# Patient Record
Sex: FEMALE | Race: OTHER | HISPANIC OR LATINO | Employment: PART TIME | URBAN - METROPOLITAN AREA
[De-identification: names, ages, dates, MRNs, and addresses within clinical notes are randomized per-mention and may not be internally consistent; named-entity substitution may affect disease eponyms.]

---

## 2017-03-28 ENCOUNTER — ALLSCRIPTS OFFICE VISIT (OUTPATIENT)
Dept: OTHER | Facility: OTHER | Age: 32
End: 2017-03-28

## 2017-04-12 ENCOUNTER — ALLSCRIPTS OFFICE VISIT (OUTPATIENT)
Dept: OTHER | Facility: OTHER | Age: 32
End: 2017-04-12

## 2017-04-26 ENCOUNTER — ALLSCRIPTS OFFICE VISIT (OUTPATIENT)
Dept: OTHER | Facility: OTHER | Age: 32
End: 2017-04-26

## 2017-05-16 ENCOUNTER — ALLSCRIPTS OFFICE VISIT (OUTPATIENT)
Dept: OTHER | Facility: OTHER | Age: 32
End: 2017-05-16

## 2017-05-30 ENCOUNTER — ALLSCRIPTS OFFICE VISIT (OUTPATIENT)
Dept: OTHER | Facility: OTHER | Age: 32
End: 2017-05-30

## 2017-06-17 ENCOUNTER — APPOINTMENT (EMERGENCY)
Dept: ULTRASOUND IMAGING | Facility: HOSPITAL | Age: 32
End: 2017-06-17
Payer: COMMERCIAL

## 2017-06-17 ENCOUNTER — HOSPITAL ENCOUNTER (EMERGENCY)
Facility: HOSPITAL | Age: 32
Discharge: HOME | End: 2017-06-17
Attending: EMERGENCY MEDICINE
Payer: COMMERCIAL

## 2017-06-17 VITALS
SYSTOLIC BLOOD PRESSURE: 118 MMHG | WEIGHT: 251 LBS | DIASTOLIC BLOOD PRESSURE: 56 MMHG | OXYGEN SATURATION: 97 % | RESPIRATION RATE: 16 BRPM | BODY MASS INDEX: 42.85 KG/M2 | TEMPERATURE: 98.7 F | HEIGHT: 64 IN | HEART RATE: 73 BPM

## 2017-06-17 DIAGNOSIS — O20.0 THREATENED ABORTION: Primary | ICD-10-CM

## 2017-06-17 LAB
ABO GROUP BLD: NORMAL
B-HCG SERPL-ACNC: 1888 MIU/ML
RH BLD: POSITIVE

## 2017-06-17 PROCEDURE — 84702 CHORIONIC GONADOTROPIN TEST: CPT | Performed by: EMERGENCY MEDICINE

## 2017-06-17 PROCEDURE — 36415 COLL VENOUS BLD VENIPUNCTURE: CPT | Performed by: EMERGENCY MEDICINE

## 2017-06-17 PROCEDURE — 99284 EMERGENCY DEPT VISIT MOD MDM: CPT

## 2017-06-17 PROCEDURE — 86901 BLOOD TYPING SEROLOGIC RH(D): CPT | Performed by: EMERGENCY MEDICINE

## 2017-06-17 PROCEDURE — 86900 BLOOD TYPING SEROLOGIC ABO: CPT | Performed by: EMERGENCY MEDICINE

## 2017-06-17 PROCEDURE — 76801 OB US < 14 WKS SINGLE FETUS: CPT

## 2017-06-19 ENCOUNTER — APPOINTMENT (EMERGENCY)
Dept: ULTRASOUND IMAGING | Facility: HOSPITAL | Age: 32
End: 2017-06-19
Payer: COMMERCIAL

## 2017-06-19 ENCOUNTER — HOSPITAL ENCOUNTER (EMERGENCY)
Facility: HOSPITAL | Age: 32
Discharge: HOME | End: 2017-06-19
Attending: EMERGENCY MEDICINE | Admitting: EMERGENCY MEDICINE
Payer: COMMERCIAL

## 2017-06-19 VITALS
TEMPERATURE: 98.3 F | SYSTOLIC BLOOD PRESSURE: 132 MMHG | HEART RATE: 72 BPM | RESPIRATION RATE: 18 BRPM | OXYGEN SATURATION: 98 % | DIASTOLIC BLOOD PRESSURE: 78 MMHG

## 2017-06-19 DIAGNOSIS — O03.9 SPONTANEOUS ABORTION: Primary | ICD-10-CM

## 2017-06-19 LAB
ABO GROUP BLD: NORMAL
B-HCG SERPL-ACNC: 392 MIU/ML
BASOPHILS # BLD AUTO: 0.01 THOUSANDS/ΜL (ref 0–0.1)
BASOPHILS NFR BLD AUTO: 0 % (ref 0–1)
BLD GP AB SCN SERPL QL: NEGATIVE
EOSINOPHIL # BLD AUTO: 0.11 THOUSAND/ΜL (ref 0–0.61)
EOSINOPHIL NFR BLD AUTO: 2 % (ref 0–6)
ERYTHROCYTE [DISTWIDTH] IN BLOOD BY AUTOMATED COUNT: 18.2 % (ref 11.6–15.1)
HCT VFR BLD AUTO: 36.8 % (ref 34.8–46.1)
HGB BLD-MCNC: 11.6 G/DL (ref 11.5–15.4)
LYMPHOCYTES # BLD AUTO: 1.7 THOUSANDS/ΜL (ref 0.6–4.47)
LYMPHOCYTES NFR BLD AUTO: 23 % (ref 14–44)
MCH RBC QN AUTO: 25.1 PG (ref 26.8–34.3)
MCHC RBC AUTO-ENTMCNC: 31.5 G/DL (ref 31.4–37.4)
MCV RBC AUTO: 80 FL (ref 82–98)
MONOCYTES # BLD AUTO: 0.4 THOUSAND/ΜL (ref 0.17–1.22)
MONOCYTES NFR BLD AUTO: 5 % (ref 4–12)
NEUTROPHILS # BLD AUTO: 5.12 THOUSANDS/ΜL (ref 1.85–7.62)
NEUTS SEG NFR BLD AUTO: 70 % (ref 43–75)
PLATELET # BLD AUTO: 286 THOUSANDS/UL (ref 149–390)
PMV BLD AUTO: 10.6 FL (ref 8.9–12.7)
RBC # BLD AUTO: 4.63 MILLION/UL (ref 3.81–5.12)
RH BLD: POSITIVE
SPECIMEN EXPIRATION DATE: NORMAL
WBC # BLD AUTO: 7.34 THOUSAND/UL (ref 4.31–10.16)

## 2017-06-19 PROCEDURE — 76856 US EXAM PELVIC COMPLETE: CPT

## 2017-06-19 PROCEDURE — 86850 RBC ANTIBODY SCREEN: CPT | Performed by: PHYSICIAN ASSISTANT

## 2017-06-19 PROCEDURE — 76830 TRANSVAGINAL US NON-OB: CPT

## 2017-06-19 PROCEDURE — 84702 CHORIONIC GONADOTROPIN TEST: CPT | Performed by: PHYSICIAN ASSISTANT

## 2017-06-19 PROCEDURE — 86900 BLOOD TYPING SEROLOGIC ABO: CPT | Performed by: PHYSICIAN ASSISTANT

## 2017-06-19 PROCEDURE — 86901 BLOOD TYPING SEROLOGIC RH(D): CPT | Performed by: PHYSICIAN ASSISTANT

## 2017-06-19 PROCEDURE — 85025 COMPLETE CBC W/AUTO DIFF WBC: CPT | Performed by: PHYSICIAN ASSISTANT

## 2017-06-19 PROCEDURE — 36415 COLL VENOUS BLD VENIPUNCTURE: CPT | Performed by: PHYSICIAN ASSISTANT

## 2017-06-19 PROCEDURE — 99284 EMERGENCY DEPT VISIT MOD MDM: CPT

## 2017-06-29 ENCOUNTER — ALLSCRIPTS OFFICE VISIT (OUTPATIENT)
Dept: OTHER | Facility: OTHER | Age: 32
End: 2017-06-29

## 2017-08-30 ENCOUNTER — ALLSCRIPTS OFFICE VISIT (OUTPATIENT)
Dept: OTHER | Facility: OTHER | Age: 32
End: 2017-08-30

## 2017-10-11 ENCOUNTER — ALLSCRIPTS OFFICE VISIT (OUTPATIENT)
Dept: OTHER | Facility: OTHER | Age: 32
End: 2017-10-11

## 2017-11-20 ENCOUNTER — GENERIC CONVERSION - ENCOUNTER (OUTPATIENT)
Dept: OTHER | Facility: OTHER | Age: 32
End: 2017-11-20

## 2017-11-20 ENCOUNTER — HOSPITAL ENCOUNTER (EMERGENCY)
Facility: HOSPITAL | Age: 32
Discharge: HOME | End: 2017-11-20
Attending: EMERGENCY MEDICINE | Admitting: EMERGENCY MEDICINE
Payer: COMMERCIAL

## 2017-11-20 VITALS
RESPIRATION RATE: 18 BRPM | SYSTOLIC BLOOD PRESSURE: 140 MMHG | WEIGHT: 260 LBS | BODY MASS INDEX: 44.63 KG/M2 | OXYGEN SATURATION: 98 % | TEMPERATURE: 98.6 F | DIASTOLIC BLOOD PRESSURE: 88 MMHG | HEART RATE: 74 BPM

## 2017-11-20 DIAGNOSIS — R51.9 NONINTRACTABLE HEADACHE, UNSPECIFIED CHRONICITY PATTERN, UNSPECIFIED HEADACHE TYPE: Primary | ICD-10-CM

## 2017-11-20 DIAGNOSIS — I10 HYPERTENSION, UNSPECIFIED TYPE: ICD-10-CM

## 2017-11-20 LAB
ANION GAP SERPL CALCULATED.3IONS-SCNC: 6 MMOL/L (ref 4–13)
BUN SERPL-MCNC: 10 MG/DL (ref 5–25)
CALCIUM SERPL-MCNC: 9.1 MG/DL (ref 8.3–10.1)
CHLORIDE SERPL-SCNC: 106 MMOL/L (ref 100–108)
CLARITY, POC: CLEAR
CO2 SERPL-SCNC: 27 MMOL/L (ref 21–32)
COLOR, POC: YELLOW
CREAT SERPL-MCNC: 0.64 MG/DL (ref 0.6–1.3)
EXT BILIRUBIN, UA: NORMAL
EXT BLOOD URINE: NORMAL
EXT GLUCOSE, UA: NORMAL
EXT KETONES: NORMAL
EXT NITRITE, UA: NORMAL
EXT PH, UA: 7
EXT PROTEIN, UA: NORMAL
EXT SPECIFIC GRAVITY, UA: 1.01
EXT UROBILINOGEN: 0.2
GFR SERPL CREATININE-BSD FRML MDRD: 119 ML/MIN/1.73SQ M
GLUCOSE SERPL-MCNC: 87 MG/DL (ref 65–140)
POTASSIUM SERPL-SCNC: 3.9 MMOL/L (ref 3.5–5.3)
SODIUM SERPL-SCNC: 139 MMOL/L (ref 136–145)
WBC # BLD EST: NORMAL 10*3/UL

## 2017-11-20 PROCEDURE — 99283 EMERGENCY DEPT VISIT LOW MDM: CPT

## 2017-11-20 PROCEDURE — 81002 URINALYSIS NONAUTO W/O SCOPE: CPT | Performed by: EMERGENCY MEDICINE

## 2017-11-20 PROCEDURE — 80048 BASIC METABOLIC PNL TOTAL CA: CPT | Performed by: EMERGENCY MEDICINE

## 2017-11-20 PROCEDURE — 36415 COLL VENOUS BLD VENIPUNCTURE: CPT | Performed by: EMERGENCY MEDICINE

## 2017-11-20 RX ORDER — AMLODIPINE BESYLATE 2.5 MG/1
5 TABLET ORAL DAILY
Qty: 30 TABLET | Refills: 6 | Status: SHIPPED | OUTPATIENT
Start: 2017-11-20 | End: 2018-06-25

## 2017-11-20 RX ORDER — MORPHINE SULFATE 15 MG/1
30 TABLET ORAL ONCE
Status: COMPLETED | OUTPATIENT
Start: 2017-11-20 | End: 2017-11-20

## 2017-11-20 RX ORDER — MORPHINE SULFATE 30 MG/1
30 TABLET ORAL EVERY 4 HOURS PRN
Qty: 5 TABLET | Refills: 0 | Status: SHIPPED | OUTPATIENT
Start: 2017-11-20 | End: 2018-04-13

## 2017-11-20 RX ADMIN — MORPHINE SULFATE 30 MG: 15 TABLET ORAL at 16:19

## 2017-11-20 NOTE — DISCHARGE INSTRUCTIONS
DIAGNOSIS: HEADACHE// BORDERLINE HIGH BLOOD PRESSURE    -  NEED TO TAKE BLOOD PRESSURE OVER SEVERAL DIFFERENT TIMES  WHEN PAIN FREE- IF PERSISTENTLY OVER 140/90- THEN CAN START BLOOD PRESSURE MEDICATION NORVASC 1 TABLET DAILY    - FOR HEADACHE- WOULD STICK WITH OVER THE COUNTER TYLENOL 500 MG EVERY 4 HRS -- FOR SEVERE PAIN- TAKE 1- ORAL MORPHINE TABLET AND GO TO LIE DOWN     - PLEASE CALL YOUR CLINIC TOMORROW TO SCHEDULE AN APPT FOR A RECHECK WITHIN 1 WEEK     - PLEASE RETURN TO  THE ER FOR ANY NEW/WORSENING HEADACHE/ HEAD WITH VOMITING OR ANY NEW/ WORSENING / CONCERNING SYMPTOMS TO YOU

## 2017-11-23 NOTE — ED PROVIDER NOTES
History  Chief Complaint   Patient presents with    Headache     Patient reports headache x 3 weeks with blurry vision  Patient denies injury to head  No hx of migraines  Patient also reports her bp has been high  33 YR FEMALE WITH 3 WEEKS OF GRADUAL ONSET R SIDED HEADACHE- AT [facility] WITH PHTOPHOBIA- -- IS CONCERNED THAT BP IS ELEVATED-- NO VISUAL COMPS- NO FEVERS/ URI/SINUS PAIN- NO HEAD TRAUMA- NO FOCAL WEAKNESS/ AATXIA        History provided by:  Patient   used: No    Headache   Associated symptoms: no dizziness, no numbness, no seizures and no weakness        None       Past Medical History:   Diagnosis Date    Anxiety     Gestational diabetes     Gestational diabetes        Past Surgical History:   Procedure Laterality Date    RENAL ARTERY STENT  2015       Family History   Problem Relation Age of Onset    Family history unknown: Yes     I have reviewed and agree with the history as documented  Social History   Substance Use Topics    Smoking status: Current Every Day Smoker    Smokeless tobacco: Not on file    Alcohol use No        Review of Systems   Constitutional: Negative  HENT: Negative  Eyes: Negative  Respiratory: Negative  Cardiovascular: Negative  Gastrointestinal: Negative  Endocrine: Negative  Genitourinary: Negative  Musculoskeletal: Negative  Skin: Negative  Allergic/Immunologic: Negative  Neurological: Positive for headaches  Negative for dizziness, tremors, seizures, syncope, facial asymmetry, speech difficulty, weakness, light-headedness and numbness  Hematological: Negative  Psychiatric/Behavioral: Negative          Physical Exam  ED Triage Vitals   Temperature Pulse Respirations Blood Pressure SpO2   11/20/17 1502 11/20/17 1500 11/20/17 1500 11/20/17 1500 11/20/17 1500   98 6 °F (37 °C) 82 18 148/93 98 %      Temp Source Heart Rate Source Patient Position - Orthostatic VS BP Location FiO2 (%)   11/20/17 1500 11/20/17 1500 11/20/17 1500 11/20/17 1500 --   Oral Monitor Sitting Left arm       Pain Score       11/20/17 1500       7           Orthostatic Vital Signs  Vitals:    11/20/17 1500 11/20/17 1641   BP: 148/93 140/88   Pulse: 82 74   Patient Position - Orthostatic VS: Sitting Lying       Physical Exam   Constitutional: She is oriented to person, place, and time  She appears well-developed and well-nourished  No distress  HTNSIVE- BUT DECREASING-- PULSE OX 98 % ON RA- INTEPRETATION IS NORMAL- NO INTERVENTION    HENT:   Head: Normocephalic and atraumatic  Right Ear: External ear normal    Left Ear: External ear normal    Nose: Nose normal    Mouth/Throat: Oropharynx is clear and moist  No oropharyngeal exudate  Eyes: Conjunctivae and EOM are normal  Pupils are equal, round, and reactive to light  Right eye exhibits no discharge  Left eye exhibits no discharge  No scleral icterus  MM PINK- NORMAL FUNDOSCOPIC EXAM BILATERALLY WITH PANOPTIC-    Neck: Normal range of motion  Neck supple  No JVD present  No tracheal deviation present  No thyromegaly present  NO MENINGEAL SIGNS   Cardiovascular: Normal rate, regular rhythm, normal heart sounds and intact distal pulses  Exam reveals no gallop and no friction rub  No murmur heard  Pulmonary/Chest: Effort normal and breath sounds normal  No stridor  No respiratory distress  She has no wheezes  She has no rales  She exhibits no tenderness  Abdominal: Soft  Bowel sounds are normal  She exhibits no distension and no mass  There is no tenderness  There is no rebound and no guarding  No hernia  Musculoskeletal: Normal range of motion  She exhibits no edema or deformity    - NORMAL/EQUAL BILATERAL RADAIL/DP PULSES- NO BLE EDEMA/CLAF TENDENRESS/ASSYM/ ERYTHEMA   Lymphadenopathy:     She has no cervical adenopathy  Neurological: She is alert and oriented to person, place, and time  No cranial nerve deficit or sensory deficit  She exhibits normal muscle tone  Coordination normal    NO NYSTAGMUS/ NEG TEST OF SCKEW- NORMAL FINGER TO NOSE BILATERALLY - NORMAL GAIT   Skin: Skin is warm  Capillary refill takes less than 2 seconds  No rash noted  She is not diaphoretic  No erythema  No pallor  Psychiatric: She has a normal mood and affect  Her behavior is normal  Judgment and thought content normal    Nursing note and vitals reviewed  ED Medications  Medications   morphine (MSIR) IR tablet 30 mg (30 mg Oral Given 11/20/17 1619)       Diagnostic Studies  Results Reviewed     Procedure Component Value Units Date/Time    Basic metabolic panel [[id number]] Collected:  11/20/17 1623    Lab Status:  Final result Specimen:  Blood from Arm, Left Updated:  11/20/17 1707     Sodium 139 mmol/L      Potassium 3 9 mmol/L      Chloride 106 mmol/L      CO2 27 mmol/L      Anion Gap 6 mmol/L      BUN 10 mg/dL      Creatinine 0 64 mg/dL      Glucose 87 mg/dL      Calcium 9 1 mg/dL      eGFR 119 ml/min/1 73sq m     Narrative:         National Kidney Disease Education Program recommendations are as follows:  GFR calculation is accurate only with a steady state creatinine  Chronic Kidney disease less than 60 ml/min/1 73 sq  meters  Kidney failure less than 15 ml/min/1 73 sq  meters      POCT urinalysis dipstick [[id number]]  (Normal) Resulted:  11/20/17 1640    Lab Status:  Final result Specimen:  Urine Updated:  11/20/17 1641     Color, UA yellow     Clarity, UA clear     EXT Glucose, UA neg     EXT Bilirubin, UA (Ref: Negative) neg     EXT Ketones, UA (Ref: Negative) neg     EXT Spec Grav, UA 1 015     EXT Blood, UA (Ref: Negative) trace     EXT pH, UA 7 0     EXT Protein, UA (Ref: Negative) neg     EXT Urobilinogen, UA (Ref: 0 2- 1 0) 0 2     EXT Leukocytes, UA (Ref: Negative) neg     EXT Nitrite, UA (Ref: Negative) trace                 No orders to display              Procedures  Procedures       Phone Contacts  ED Phone Contact    ED Course  ED Course MDM  CritCare Time    Disposition  Final diagnoses:   Nonintractable headache, unspecified chronicity pattern, unspecified headache type   Hypertension, unspecified type     Time reflects when diagnosis was documented in both MDM as applicable and the Disposition within this note     Time User Action Codes Description Comment    11/20/2017  5:22 PM Jim Forward D Add [R51] Nonintractable headache, unspecified chronicity pattern, unspecified headache type     11/20/2017  5:22 PM Jim Forward D Add [I10] Hypertension, unspecified type       ED Disposition     ED Disposition Condition Comment    Discharge  Kaye Weinberg discharge to home/self care  Condition at discharge: Good        Follow-up Information    None       Discharge Medication List as of 11/20/2017  5:28 PM      START taking these medications    Details   amLODIPine (NORVASC) 2 5 mg tablet Take 2 tablets by mouth daily, Starting Mon 11/20/2017, Print      morphine (MSIR) 30 MG tablet Take 1 tablet by mouth every 4 (four) hours as needed for severe pain for up to 5 doses Max Daily Amount: 180 mg, Starting Mon 11/20/2017, Print           No discharge procedures on file      ED Provider  Electronically Signed by           Emmanuel Carmen MD  11/23/17 7112

## 2017-12-04 ENCOUNTER — APPOINTMENT (EMERGENCY)
Dept: RADIOLOGY | Facility: HOSPITAL | Age: 32
End: 2017-12-04
Payer: COMMERCIAL

## 2017-12-04 ENCOUNTER — HOSPITAL ENCOUNTER (EMERGENCY)
Facility: HOSPITAL | Age: 32
Discharge: HOME | End: 2017-12-04
Attending: EMERGENCY MEDICINE | Admitting: EMERGENCY MEDICINE
Payer: COMMERCIAL

## 2017-12-04 VITALS
TEMPERATURE: 98.8 F | OXYGEN SATURATION: 98 % | HEART RATE: 82 BPM | RESPIRATION RATE: 18 BRPM | DIASTOLIC BLOOD PRESSURE: 76 MMHG | SYSTOLIC BLOOD PRESSURE: 157 MMHG

## 2017-12-04 DIAGNOSIS — J06.9 URI WITH COUGH AND CONGESTION: Primary | ICD-10-CM

## 2017-12-04 PROCEDURE — 99283 EMERGENCY DEPT VISIT LOW MDM: CPT

## 2017-12-04 PROCEDURE — 71020 HB CHEST X-RAY 2VW FRONTAL&LATL: CPT

## 2017-12-04 RX ORDER — BENZONATATE 100 MG/1
100 CAPSULE ORAL EVERY 8 HOURS
Qty: 21 CAPSULE | Refills: 0 | Status: SHIPPED | OUTPATIENT
Start: 2017-12-04 | End: 2018-04-13

## 2017-12-04 NOTE — ED PROVIDER NOTES
History  Chief Complaint   Patient presents with    URI     pt here for cough x 2 weeks  worsening symptoms, c/o congestion and bodyaches  History provided by:  Patient  URI   Presenting symptoms: congestion and cough    Presenting symptoms: no fever and no sore throat    Congestion:     Location:  Nasal and chest    Interferes with sleep: no      Interferes with eating/drinking: no    Severity:  Moderate  Onset quality:  Gradual  Duration:  2 weeks  Timing:  Intermittent  Progression:  Waxing and waning  Chronicity:  New  Relieved by:  None tried  Worsened by:  Nothing  Ineffective treatments:  None tried  Associated symptoms: no headaches, no neck pain and no wheezing        Prior to Admission Medications   Prescriptions Last Dose Informant Patient Reported? Taking? amLODIPine (NORVASC) 2 5 mg tablet   No No   Sig: Take 2 tablets by mouth daily   morphine (MSIR) 30 MG tablet   No No   Sig: Take 1 tablet by mouth every 4 (four) hours as needed for severe pain for up to 5 doses Max Daily Amount: 180 mg      Facility-Administered Medications: None       Past Medical History:   Diagnosis Date    Anxiety     Gestational diabetes     Gestational diabetes        Past Surgical History:   Procedure Laterality Date    RENAL ARTERY STENT  2015       Family History   Problem Relation Age of Onset    Family history unknown: Yes     I have reviewed and agree with the history as documented  Social History   Substance Use Topics    Smoking status: Current Every Day Smoker    Smokeless tobacco: Not on file    Alcohol use No        Review of Systems   Constitutional: Negative for activity change, chills, diaphoresis and fever  HENT: Positive for congestion  Negative for sinus pressure and sore throat  Eyes: Negative for pain and visual disturbance  Respiratory: Positive for cough  Negative for chest tightness, shortness of breath, wheezing and stridor      Cardiovascular: Negative for chest pain and palpitations  Gastrointestinal: Negative for abdominal distention, abdominal pain, constipation, diarrhea, nausea and vomiting  Genitourinary: Negative for dysuria and frequency  Musculoskeletal: Negative for neck pain and neck stiffness  Skin: Negative for rash  Neurological: Negative for dizziness, speech difficulty, light-headedness, numbness and headaches  Physical Exam  ED Triage Vitals [12/04/17 0947]   Temperature Pulse Respirations Blood Pressure SpO2   98 8 °F (37 1 °C) 82 18 157/76 98 %      Temp Source Heart Rate Source Patient Position - Orthostatic VS BP Location FiO2 (%)   Oral Monitor Sitting Left arm --      Pain Score       Worst Possible Pain           Orthostatic Vital Signs  Vitals:    12/04/17 0947   BP: 157/76   Pulse: 82   Patient Position - Orthostatic VS: Sitting       Physical Exam   Constitutional: She is oriented to person, place, and time  She appears well-developed  No distress  HENT:   Head: Normocephalic and atraumatic  Eyes: Pupils are equal, round, and reactive to light  Neck: Normal range of motion  Neck supple  No tracheal deviation present  Cardiovascular: Normal rate, regular rhythm, normal heart sounds and intact distal pulses  No murmur heard  Pulmonary/Chest: Effort normal and breath sounds normal  No stridor  No respiratory distress  She has no wheezes  She has no rales  She exhibits no tenderness  Abdominal: Soft  She exhibits no distension  There is no tenderness  There is no rebound and no guarding  Musculoskeletal: Normal range of motion  Neurological: She is alert and oriented to person, place, and time  Skin: Skin is warm and dry  She is not diaphoretic  No erythema  No pallor  Psychiatric: She has a normal mood and affect  Vitals reviewed        ED Medications  Medications - No data to display    Diagnostic Studies  Results Reviewed     None                 XR chest 2 views   ED Interpretation by Andreea Foster DO (12/04 1036) No acute pathology                 Procedures  Procedures       Phone Contacts  ED Phone Contact    ED Course  ED Course as of Dec 04 1037   Mon Dec 04, 2017   1035 XR chest 2 views   1035 XR chest 2 views        Smoking cessation discussion with patient:  3-5 minute conversation with the patient regarding smoking cessation  I explained at length risks of long term smoking, associated health conditions, benefits of quitting, as well as quitting methods  Patient receptive and understands                          MDM  Number of Diagnoses or Management Options  URI with cough and congestion: new and requires workup     Amount and/or Complexity of Data Reviewed  Tests in the radiology section of CPT®: ordered and reviewed  Decide to obtain previous medical records or to obtain history from someone other than the patient: yes  Obtain history from someone other than the patient: yes  Review and summarize past medical records: yes  Independent visualization of images, tracings, or specimens: yes      CritCare Time    Disposition  Final diagnoses:   URI with cough and congestion     Time reflects when diagnosis was documented in both MDM as applicable and the Disposition within this note     Time User Action Codes Description Comment    12/4/2017 10:36 AM Peggy Cocke Add [J06 9] URI with cough and congestion       ED Disposition     ED Disposition Condition Comment    Discharge  Kaye Nabila Thorpe discharge to home/self care  Condition at discharge: Good        Follow-up Information    None       Patient's Medications   Discharge Prescriptions    BENZONATATE (TESSALON PERLES) 100 MG CAPSULE    Take 1 capsule by mouth every 8 (eight) hours       Start Date: 12/4/2017 End Date: --       Order Dose: 100 mg       Quantity: 21 capsule    Refills: 0     No discharge procedures on file      ED Provider  Electronically Signed by           Dennise Wong DO  12/04/17 Radha

## 2017-12-04 NOTE — DISCHARGE INSTRUCTIONS
Acute Bronchitis   AMBULATORY CARE:   Acute bronchitis  is swelling and irritation in the air passages of your lungs  This irritation may cause you to cough or have other breathing problems  Acute bronchitis often starts because of another illness, such as a cold or the flu  The illness spreads from your nose and throat to your windpipe and airways  Bronchitis is often called a chest cold  Acute bronchitis lasts about 3 to 6 weeks and is usually not a serious illness  Your cough can last for several weeks  You may have any of the following symptoms:   · A cough with sputum that may be clear, yellow, or green    · Feeling more tired than usual, and body aches    · A fever and chills    · Wheezing when you breathe    · A tight chest or pain when you breathe or cough  Seek care immediately if:   · You cough up blood  · Your lips or fingernails turn blue  · You feel like you are not getting enough air when you breathe  Contact your healthcare provider if:   · You have a fever  · Your breathing problems do not go away or get worse  · Your cough does not get better within 4 weeks  · You have questions or concerns about your condition or care  Self-care:   · Get more rest   Rest helps your body to heal  Slowly start to do more each day  Rest when you feel it is needed  · Avoid irritants in the air  Avoid chemicals, fumes, and dust  Wear a face mask if you must work around dust or fumes  Stay inside on days when air pollution levels are high  If you have allergies, stay inside when pollen counts are high  Do not use aerosol products, such as spray-on deodorant, bug spray, and hair spray  · Do not smoke or be around others who smoke  Nicotine and other chemicals in cigarettes and cigars damages the cilia that move mucus out of your lungs  Ask your healthcare provider for information if you currently smoke and need help to quit  E-cigarettes or smokeless tobacco still contain nicotine   Talk to your healthcare provider before you use these products  · Drink liquids as directed  Liquids help keep your air passages moist and help you cough up mucus  You may need to drink more liquids when you have acute bronchitis  Ask how much liquid to drink each day and which liquids are best for you  · Use a humidifier or vaporizer  Use a cool mist humidifier or a vaporizer to increase air moisture in your home  This may make it easier for you to breathe and help decrease your cough  Prevent acute bronchitis by doing the following:   · Get the vaccinations you need  Ask your healthcare provider if you should get vaccinated against the flu or pneumonia  · Prevent the spread of germs  You can decrease your risk of acute bronchitis and other illnesses by doing the following:     [facility] your hands often with soap and water  Carry germ-killing hand lotion or gel with you  You can use the lotion or gel to clean your hands when soap and water are not available  ¨ Do not touch your eyes, nose, or mouth unless you have washed your hands first     ¨ Always cover your mouth when you cough to prevent the spread of germs  It is best to cough into a tissue or your shirt sleeve instead of into your hand  Ask those around you cover their mouths when they cough  ¨ Try to avoid people who have a cold or the flu  If you are sick, stay away from others as much as possible  Medicines: Your healthcare provider may  give you any of the following:  · Ibuprofen or acetaminophen  are medicines that help lower your fever  They are available without a doctor's order  Ask your healthcare provider which medicine is right for you  Ask how much to take and how often to take it  Follow directions  These medicines can cause stomach bleeding if not taken correctly  Ibuprofen can cause kidney damage  Do not take ibuprofen if you have kidney disease, an ulcer, or allergies to aspirin  Acetaminophen can cause liver damage   Do not take more

## 2017-12-14 ENCOUNTER — GENERIC CONVERSION - ENCOUNTER (OUTPATIENT)
Dept: OTHER | Facility: OTHER | Age: 32
End: 2017-12-14

## 2017-12-14 DIAGNOSIS — I10 ESSENTIAL (PRIMARY) HYPERTENSION: ICD-10-CM

## 2017-12-14 DIAGNOSIS — E66.9 OBESITY: ICD-10-CM

## 2017-12-22 ENCOUNTER — HOSPITAL ENCOUNTER (EMERGENCY)
Facility: HOSPITAL | Age: 32
Discharge: HOME | End: 2017-12-23
Attending: EMERGENCY MEDICINE | Admitting: EMERGENCY MEDICINE
Payer: COMMERCIAL

## 2017-12-22 VITALS
WEIGHT: 267 LBS | HEART RATE: 91 BPM | RESPIRATION RATE: 22 BRPM | SYSTOLIC BLOOD PRESSURE: 122 MMHG | OXYGEN SATURATION: 98 % | TEMPERATURE: 99.5 F | DIASTOLIC BLOOD PRESSURE: 56 MMHG | BODY MASS INDEX: 45.83 KG/M2

## 2017-12-22 DIAGNOSIS — R11.10 VOMITING: ICD-10-CM

## 2017-12-22 DIAGNOSIS — N39.0 UTI (URINARY TRACT INFECTION): Primary | ICD-10-CM

## 2017-12-22 LAB
ALBUMIN SERPL BCP-MCNC: 3.5 G/DL (ref 3.5–5)
ALP SERPL-CCNC: 100 U/L (ref 46–116)
ALT SERPL W P-5'-P-CCNC: 34 U/L (ref 12–78)
ANION GAP SERPL CALCULATED.3IONS-SCNC: 13 MMOL/L (ref 4–13)
AST SERPL W P-5'-P-CCNC: 14 U/L (ref 5–45)
BACTERIA UR QL AUTO: ABNORMAL /HPF
BASOPHILS # BLD AUTO: 0 THOUSANDS/ΜL (ref 0–0.1)
BASOPHILS NFR BLD AUTO: 0 % (ref 0–1)
BILIRUB SERPL-MCNC: 0.4 MG/DL (ref 0.2–1)
BILIRUB UR QL STRIP: NEGATIVE
BUN SERPL-MCNC: 11 MG/DL (ref 5–25)
CALCIUM SERPL-MCNC: 8.8 MG/DL (ref 8.3–10.1)
CHLORIDE SERPL-SCNC: 104 MMOL/L (ref 100–108)
CLARITY UR: ABNORMAL
CO2 SERPL-SCNC: 23 MMOL/L (ref 21–32)
COLOR UR: YELLOW
CREAT SERPL-MCNC: 0.66 MG/DL (ref 0.6–1.3)
EOSINOPHIL # BLD AUTO: 0.01 THOUSAND/ΜL (ref 0–0.61)
EOSINOPHIL NFR BLD AUTO: 0 % (ref 0–6)
ERYTHROCYTE [DISTWIDTH] IN BLOOD BY AUTOMATED COUNT: 15.4 % (ref 11.6–15.1)
EXT PREG TEST URINE: NEGATIVE
GFR SERPL CREATININE-BSD FRML MDRD: 118 ML/MIN/1.73SQ M
GLUCOSE SERPL-MCNC: 124 MG/DL (ref 65–140)
GLUCOSE UR STRIP-MCNC: NEGATIVE MG/DL
HCT VFR BLD AUTO: 41.2 % (ref 34.8–46.1)
HGB BLD-MCNC: 13.2 G/DL (ref 11.5–15.4)
HGB UR QL STRIP.AUTO: NEGATIVE
KETONES UR STRIP-MCNC: NEGATIVE MG/DL
LEUKOCYTE ESTERASE UR QL STRIP: NEGATIVE
LIPASE SERPL-CCNC: 77 U/L (ref 73–393)
LYMPHOCYTES # BLD AUTO: 0.88 THOUSANDS/ΜL (ref 0.6–4.47)
LYMPHOCYTES NFR BLD AUTO: 14 % (ref 14–44)
MCH RBC QN AUTO: 26.2 PG (ref 26.8–34.3)
MCHC RBC AUTO-ENTMCNC: 32 G/DL (ref 31.4–37.4)
MCV RBC AUTO: 82 FL (ref 82–98)
MONOCYTES # BLD AUTO: 0.46 THOUSAND/ΜL (ref 0.17–1.22)
MONOCYTES NFR BLD AUTO: 8 % (ref 4–12)
MUCOUS THREADS UR QL AUTO: ABNORMAL
NEUTROPHILS # BLD AUTO: 4.8 THOUSANDS/ΜL (ref 1.85–7.62)
NEUTS SEG NFR BLD AUTO: 78 % (ref 43–75)
NITRITE UR QL STRIP: POSITIVE
NON-SQ EPI CELLS URNS QL MICRO: ABNORMAL /HPF
PH UR STRIP.AUTO: 5.5 [PH] (ref 4.5–8)
PLATELET # BLD AUTO: 299 THOUSANDS/UL (ref 149–390)
PMV BLD AUTO: 10.7 FL (ref 8.9–12.7)
POTASSIUM SERPL-SCNC: 3.4 MMOL/L (ref 3.5–5.3)
PROT SERPL-MCNC: 7.2 G/DL (ref 6.4–8.2)
PROT UR STRIP-MCNC: ABNORMAL MG/DL
RBC # BLD AUTO: 5.03 MILLION/UL (ref 3.81–5.12)
RBC #/AREA URNS AUTO: ABNORMAL /HPF
SODIUM SERPL-SCNC: 140 MMOL/L (ref 136–145)
SP GR UR STRIP.AUTO: >=1.03 (ref 1–1.03)
UROBILINOGEN UR QL STRIP.AUTO: 0.2 E.U./DL
WBC # BLD AUTO: 6.15 THOUSAND/UL (ref 4.31–10.16)
WBC #/AREA URNS AUTO: ABNORMAL /HPF

## 2017-12-22 PROCEDURE — 96361 HYDRATE IV INFUSION ADD-ON: CPT

## 2017-12-22 PROCEDURE — 81025 URINE PREGNANCY TEST: CPT | Performed by: EMERGENCY MEDICINE

## 2017-12-22 PROCEDURE — 85025 COMPLETE CBC W/AUTO DIFF WBC: CPT | Performed by: EMERGENCY MEDICINE

## 2017-12-22 PROCEDURE — 96375 TX/PRO/DX INJ NEW DRUG ADDON: CPT

## 2017-12-22 PROCEDURE — 96374 THER/PROPH/DIAG INJ IV PUSH: CPT

## 2017-12-22 PROCEDURE — 36415 COLL VENOUS BLD VENIPUNCTURE: CPT | Performed by: EMERGENCY MEDICINE

## 2017-12-22 PROCEDURE — 80053 COMPREHEN METABOLIC PANEL: CPT | Performed by: EMERGENCY MEDICINE

## 2017-12-22 PROCEDURE — 81001 URINALYSIS AUTO W/SCOPE: CPT | Performed by: EMERGENCY MEDICINE

## 2017-12-22 PROCEDURE — 83690 ASSAY OF LIPASE: CPT | Performed by: EMERGENCY MEDICINE

## 2017-12-22 RX ORDER — CEPHALEXIN 500 MG/1
500 CAPSULE ORAL EVERY 6 HOURS SCHEDULED
Qty: 40 CAPSULE | Refills: 0 | Status: SHIPPED | OUTPATIENT
Start: 2017-12-22 | End: 2018-01-01

## 2017-12-22 RX ORDER — CEPHALEXIN 250 MG/1
500 CAPSULE ORAL ONCE
Status: COMPLETED | OUTPATIENT
Start: 2017-12-22 | End: 2017-12-22

## 2017-12-22 RX ORDER — ONDANSETRON 2 MG/ML
4 INJECTION INTRAMUSCULAR; INTRAVENOUS ONCE
Status: COMPLETED | OUTPATIENT
Start: 2017-12-22 | End: 2017-12-22

## 2017-12-22 RX ORDER — ONDANSETRON 4 MG/1
4 TABLET, ORALLY DISINTEGRATING ORAL EVERY 8 HOURS PRN
Qty: 20 TABLET | Refills: 0 | Status: SHIPPED | OUTPATIENT
Start: 2017-12-22 | End: 2018-04-13

## 2017-12-22 RX ORDER — KETOROLAC TROMETHAMINE 30 MG/ML
15 INJECTION, SOLUTION INTRAMUSCULAR; INTRAVENOUS ONCE
Status: COMPLETED | OUTPATIENT
Start: 2017-12-22 | End: 2017-12-22

## 2017-12-22 RX ADMIN — SODIUM CHLORIDE 1000 ML: 0.9 INJECTION, SOLUTION INTRAVENOUS at 23:22

## 2017-12-22 RX ADMIN — KETOROLAC TROMETHAMINE 15 MG: 30 INJECTION, SOLUTION INTRAMUSCULAR at 22:57

## 2017-12-22 RX ADMIN — CEPHALEXIN 500 MG: 250 CAPSULE ORAL at 23:42

## 2017-12-22 RX ADMIN — ONDANSETRON 4 MG: 2 INJECTION INTRAMUSCULAR; INTRAVENOUS at 22:56

## 2017-12-23 PROCEDURE — 99284 EMERGENCY DEPT VISIT MOD MDM: CPT

## 2017-12-23 NOTE — ED PROVIDER NOTES
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  Patient denies any fevers cough or shortness of breath  History provided by:  Patient   used: No    Abdominal Pain   Pain location:  Suprapubic  Pain quality: cramping    Pain radiates to:  Does not radiate  Pain severity:  Moderate  Onset quality:  Gradual  Duration:  2 days  Timing:  Constant  Progression:  Worsening  Chronicity:  New  Context: sick contacts    Context: not alcohol use and not retching    Ineffective treatments:  None tried  Associated symptoms: anorexia, nausea and vomiting    Associated symptoms: no chest pain, no cough, no diarrhea, no fatigue, no fever, no hematuria, no shortness of breath and no vaginal discharge    Nausea: Onset quality:  Gradual    Duration:  2 days    Timing:  Constant    Progression:  Worsening  Vomiting:     Quality:  Stomach contents    Severity:  Moderate    Duration:  2 days    Timing:  Constant    Progression:  Worsening  Risk factors: no alcohol abuse, no NSAID use and not pregnant        Prior to Admission Medications   Prescriptions Last Dose Informant Patient Reported? Taking?    amLODIPine (NORVASC) 2 5 mg tablet   No No   Sig: Take 2 tablets by mouth daily   benzonatate (TESSALON PERLES) 100 mg capsule   No No   Sig: Take 1 capsule by mouth every 8 (eight) hours   morphine (MSIR) 30 MG tablet   No No   Sig: Take 1 tablet by mouth every 4 (four) hours as needed for severe pain for up to 5 doses Max Daily Amount: 180 mg      Facility-Administered Medications: None Past Medical History:   Diagnosis Date    Anxiety     Gestational diabetes     Gestational diabetes     Hypertension        Past Surgical History:   Procedure Laterality Date    RENAL ARTERY STENT  2015       Family History   Problem Relation Age of Onset    Family history unknown: Yes     I have reviewed and agree with the history as documented  Social History   Substance Use Topics    Smoking status: Current Every Day Smoker    Smokeless tobacco: Never Used    Alcohol use No        Review of Systems   Constitutional: Negative for fatigue and fever  HENT: Negative for congestion and ear pain  Eyes: Negative for discharge and redness  Respiratory: Negative for apnea, cough, shortness of breath and wheezing  Cardiovascular: Negative for chest pain  Gastrointestinal: Positive for abdominal pain, anorexia, nausea and vomiting  Negative for diarrhea  Endocrine: Negative for cold intolerance and polydipsia  Genitourinary: Negative for difficulty urinating, hematuria and vaginal discharge  Musculoskeletal: Negative for arthralgias and back pain  Skin: Negative for color change and rash  Allergic/Immunologic: Negative for environmental allergies and immunocompromised state  Neurological: Negative for numbness and headaches  Hematological: Negative for adenopathy  Does not bruise/bleed easily  Psychiatric/Behavioral: Negative for agitation and behavioral problems         Physical Exam  ED Triage Vitals   Temperature Pulse Respirations Blood Pressure SpO2   12/22/17 1932 12/22/17 1932 12/22/17 1932 12/22/17 1932 12/22/17 1932   99 5 °F (37 5 °C) (!) 107 20 148/95 99 %      Temp Source Heart Rate Source Patient Position - Orthostatic VS BP Location FiO2 (%)   12/22/17 1932 12/22/17 2213 12/22/17 2213 12/22/17 2213 --   Oral Monitor Lying Right arm       Pain Score       12/22/17 1932       9           Orthostatic Vital Signs  Vitals:    12/22/17 1932 12/22/17 2058 12/22/17 2213 12/22/17 2331   BP: 148/95  139/80 122/56   Pulse: (!) 107 97 94 91   Patient Position - Orthostatic VS:   Lying Lying       Physical Exam   Constitutional: She is oriented to person, place, and time  Vital signs are normal  She appears well-developed and well-nourished  Non-toxic appearance  She appears distressed  HENT:   Head: Normocephalic and atraumatic  Right Ear: Tympanic membrane and external ear normal    Left Ear: Tympanic membrane and external ear normal    Nose: Nose normal  No rhinorrhea, sinus tenderness or nasal deformity  Mouth/Throat: Uvula is midline and oropharynx is clear and moist  Normal dentition  Eyes: Conjunctivae, EOM and lids are normal  Pupils are equal, round, and reactive to light  Right eye exhibits no discharge  Left eye exhibits no discharge  Neck: Trachea normal and normal range of motion  Neck supple  No JVD present  Carotid bruit is not present  Cardiovascular: Normal rate, regular rhythm, intact distal pulses and normal pulses  No extrasystoles are present  PMI is not displaced  Pulmonary/Chest: Effort normal and breath sounds normal  No accessory muscle usage  No respiratory distress  She has no wheezes  She has no rhonchi  She has no rales  Abdominal: Soft  Normal appearance and bowel sounds are normal  She exhibits no mass  There is tenderness in the suprapubic area  There is no rigidity, no rebound and no guarding  Musculoskeletal:        Right shoulder: She exhibits normal range of motion, no bony tenderness, no swelling and no deformity  Cervical back: Normal  She exhibits normal range of motion, no tenderness, no bony tenderness and no deformity  Lymphadenopathy:     She has no cervical adenopathy  She has no axillary adenopathy  Neurological: She is alert and oriented to person, place, and time  She has normal strength and normal reflexes  No cranial nerve deficit or sensory deficit  GCS eye subscore is 4  GCS verbal subscore is 5   GCS motor subscore is 6  Skin: Skin is warm and dry  No rash noted  Psychiatric: She has a normal mood and affect  Her speech is normal and behavior is normal    Nursing note and vitals reviewed        ED Medications  Medications   ondansetron (ZOFRAN) injection 4 mg (4 mg Intravenous Given 12/22/17 2256)   ketorolac (TORADOL) injection 15 mg (15 mg Intravenous Given 12/22/17 2257)   sodium chloride 0 9 % bolus 1,000 mL (0 mL Intravenous Stopped 12/23/17 0042)   cephalexin (KEFLEX) capsule 500 mg (500 mg Oral Given 12/22/17 2342)       Diagnostic Studies  Results Reviewed     Procedure Component Value Units Date/Time    Urine Microscopic [[id number]]  (Abnormal) Collected:  12/22/17 2312    Lab Status:  Final result Specimen:  Urine from Urine, Clean Catch Updated:  12/22/17 2336     RBC, UA 2-4 (A) /hpf      WBC, UA 4-10 (A) /hpf      Epithelial Cells Occasional /hpf      Bacteria, UA Occasional /hpf      MUCOUS THREADS Moderate    UA w Reflex to Microscopic w Reflex to Culture [[id number]]  (Abnormal) Collected:  12/22/17 2312    Lab Status:  Final result Specimen:  Urine from Urine, Clean Catch Updated:  12/22/17 2321     Color, UA Yellow     Clarity, UA Cloudy     Specific Gravity, UA >=1 030     pH, UA 5 5     Leukocytes, UA Negative     Nitrite, UA Positive (A)     Protein, UA Trace (A) mg/dl      Glucose, UA Negative mg/dl      Ketones, UA Negative mg/dl      Urobilinogen, UA 0 2 E U /dl      Bilirubin, UA Negative     Blood, UA Negative    POCT pregnancy, urine [[id number]]  (Normal) Resulted:  12/22/17 2316    Lab Status:  Final result Updated:  12/22/17 2316     EXT PREG TEST UR (Ref: Negative) negative    Comprehensive metabolic panel [[id number]]  (Abnormal) Collected:  12/22/17 2239    Lab Status:  Final result Specimen:  Blood from Arm, Left Updated:  12/22/17 2305     Sodium 140 mmol/L      Potassium 3 4 (L) mmol/L      Chloride 104 mmol/L      CO2 23 mmol/L      Anion Gap 13 mmol/L      BUN 11 mg/dL Creatinine 0 66 mg/dL      Glucose 124 mg/dL      Calcium 8 8 mg/dL      AST 14 U/L      ALT 34 U/L      Alkaline Phosphatase 100 U/L      Total Protein 7 2 g/dL      Albumin 3 5 g/dL      Total Bilirubin 0 40 mg/dL      eGFR 118 ml/min/1 73sq m     Narrative:         National Kidney Disease Education Program recommendations are as follows:  GFR calculation is accurate only with a steady state creatinine  Chronic Kidney disease less than 60 ml/min/1 73 sq  meters  Kidney failure less than 15 ml/min/1 73 sq  meters      Lipase [[id number]]  (Normal) Collected:  12/22/17 2239    Lab Status:  Final result Specimen:  Blood from Arm, Left Updated:  12/22/17 2305     Lipase 77 u/L     CBC and differential [[id number]]  (Abnormal) Collected:  12/22/17 2239    Lab Status:  Final result Specimen:  Blood from Arm, Left Updated:  12/22/17 2249     WBC 6 15 Thousand/uL      RBC 5 03 Million/uL      Hemoglobin 13 2 g/dL      Hematocrit 41 2 %      MCV 82 fL      MCH 26 2 (L) pg      MCHC 32 0 g/dL      RDW 15 4 (H) %      MPV 10 7 fL      Platelets 931 Thousands/uL      Neutrophils Relative 78 (H) %      Lymphocytes Relative 14 %      Monocytes Relative 8 %      Eosinophils Relative 0 %      Basophils Relative 0 %      Neutrophils Absolute 4 80 Thousands/µL      Lymphocytes Absolute 0 88 Thousands/µL      Monocytes Absolute 0 46 Thousand/µL      Eosinophils Absolute 0 01 Thousand/µL      Basophils Absolute 0 00 Thousands/µL                  No orders to display              Procedures  Procedures       Phone Contacts  ED Phone Contact    ED Course  ED Course                                MDM  Number of Diagnoses or Management Options  UTI (urinary tract infection): new and requires workup  Vomiting: new and requires workup     Amount and/or Complexity of Data Reviewed  Clinical lab tests: ordered and reviewed  Tests in the medicine section of CPT®: ordered and reviewed    Patient Progress  Patient progress: improved    CritCare Time    Disposition  Final diagnoses:   UTI (urinary tract infection)   Vomiting     Time reflects when diagnosis was documented in both MDM as applicable and the Disposition within this note     Time User Action Codes Description Comment    12/22/2017 11:30 PM Elnoria Viktoria K Add [N39 0] UTI (urinary tract infection)     12/22/2017 11:30 PM Elnoria Viktoria K Add [R11 10] Vomiting       ED Disposition     ED Disposition Condition Comment    Discharge  Kaye Apodaca discharge to home/self care  Condition at discharge: Good        Follow-up Information     Follow up With Specialties Details Why Contact Info    Efrem Parnell MD [facility] Medicine Schedule an appointment as soon as possible for a visit  Buddy Balbuena   [phone]          Discharge Medication List as of 12/22/2017 11:31 PM      START taking these medications    Details   cephalexin (KEFLEX) 500 mg capsule Take 1 capsule by mouth every 6 (six) hours for 10 days, Starting Fri 12/22/2017, Until Mon 1/1/2018, Print      ondansetron (ZOFRAN-ODT) 4 mg disintegrating tablet Take 1 tablet by mouth every 8 (eight) hours as needed for nausea or vomiting for up to 7 days, Starting Fri 12/22/2017, Until Fri 12/29/2017, Print         CONTINUE these medications which have NOT CHANGED    Details   amLODIPine (NORVASC) 2 5 mg tablet Take 2 tablets by mouth daily, Starting Mon 11/20/2017, Print      benzonatate (TESSALON PERLES) 100 mg capsule Take 1 capsule by mouth every 8 (eight) hours, Starting Mon 12/4/2017, Print      morphine (MSIR) 30 MG tablet Take 1 tablet by mouth every 4 (four) hours as needed for severe pain for up to 5 doses Max Daily Amount: 180 mg, Starting Mon 11/20/2017, Print           No discharge procedures on file      ED Provider  Electronically Signed by           Courtney Dunn DO  12/23/17 4313

## 2017-12-23 NOTE — DISCHARGE INSTRUCTIONS

## 2018-01-11 NOTE — MISCELLANEOUS
Provider Comments  Provider Comments:   pt was a no show for appt today      Signatures   Electronically signed by : Toby Rodriguez, ; Apr 26 2017  4:20PM EST                       (Author)

## 2018-01-12 VITALS
RESPIRATION RATE: 18 BRPM | HEART RATE: 84 BPM | TEMPERATURE: 98.4 F | WEIGHT: 263 LBS | DIASTOLIC BLOOD PRESSURE: 70 MMHG | BODY MASS INDEX: 44.9 KG/M2 | SYSTOLIC BLOOD PRESSURE: 118 MMHG | HEIGHT: 64 IN

## 2018-01-12 NOTE — MISCELLANEOUS
Provider Comments  Provider Comments:   pt was a no show today      Signatures   Electronically signed by : Susana Ornelas, ; Mar 28 2017  1:29PM EST                       (Author)

## 2018-01-13 VITALS
DIASTOLIC BLOOD PRESSURE: 64 MMHG | HEART RATE: 108 BPM | WEIGHT: 261.5 LBS | RESPIRATION RATE: 16 BRPM | SYSTOLIC BLOOD PRESSURE: 112 MMHG | HEIGHT: 64 IN | TEMPERATURE: 100.4 F | BODY MASS INDEX: 44.64 KG/M2

## 2018-01-14 VITALS
TEMPERATURE: 98.4 F | DIASTOLIC BLOOD PRESSURE: 76 MMHG | SYSTOLIC BLOOD PRESSURE: 120 MMHG | HEART RATE: 82 BPM | HEIGHT: 64 IN | RESPIRATION RATE: 18 BRPM | WEIGHT: 265 LBS | BODY MASS INDEX: 45.24 KG/M2

## 2018-01-14 NOTE — MISCELLANEOUS
Provider Comments  Provider Comments:   PT NO SHOW FOR APPT TODAY      Signatures   Electronically signed by : Gabino Kimbrough, ; Apr 4 2016  5:00PM EST                       (Author)    Electronically signed by : Jeff Carrion MD; Apr 4 2016  5:05PM EST                       (Author)    Electronically signed by : Danielle Escalante DO;  Apr 7 2016  5:09PM EST                       (Author)

## 2018-01-16 NOTE — MISCELLANEOUS
Provider Comments  Provider Comments:   pt was a no show today      Signatures   Electronically signed by : Jayna Johnson, ; Aug 30 2017 11:06AM EST                       (Author)

## 2018-01-16 NOTE — MISCELLANEOUS
Provider Comments  Provider Comments:   PT NO SHOWED TODAY      Signatures   Electronically signed by : Jovany Granados, ; Nov 20 2017  2:46PM EST                       (Author)

## 2018-01-16 NOTE — MISCELLANEOUS
Provider Comments  Provider Comments:   pt was a no show today      Signatures   Electronically signed by : Gaviota Randall, ; Jun 29 2017  1:58PM EST                       (Author)

## 2018-01-16 NOTE — MISCELLANEOUS
Provider Comments  Provider Comments:   pt was a no show for appt today      Signatures   Electronically signed by : Gilbert Kat, ; Oct 11 2017  2:00PM EST                       (Author)

## 2018-01-18 NOTE — MISCELLANEOUS
Provider Comments  Provider Comments:   pt was a no show for appt today      Signatures   Electronically signed by : Vero Beard, ; Dec 19 2016  6:57PM EST                       (Author)

## 2018-01-24 VITALS
HEART RATE: 78 BPM | HEIGHT: 64 IN | RESPIRATION RATE: 16 BRPM | BODY MASS INDEX: 45.8 KG/M2 | WEIGHT: 268.25 LBS | TEMPERATURE: 97.6 F | SYSTOLIC BLOOD PRESSURE: 130 MMHG | DIASTOLIC BLOOD PRESSURE: 90 MMHG

## 2018-04-13 ENCOUNTER — OFFICE VISIT (OUTPATIENT)
Dept: FAMILY MEDICINE CLINIC | Facility: CLINIC | Age: 33
End: 2018-04-13
Payer: COMMERCIAL

## 2018-04-13 VITALS
TEMPERATURE: 98.4 F | DIASTOLIC BLOOD PRESSURE: 70 MMHG | HEART RATE: 80 BPM | BODY MASS INDEX: 46.61 KG/M2 | RESPIRATION RATE: 18 BRPM | WEIGHT: 273 LBS | SYSTOLIC BLOOD PRESSURE: 118 MMHG | HEIGHT: 64 IN

## 2018-04-13 DIAGNOSIS — R42 DIZZINESS: ICD-10-CM

## 2018-04-13 DIAGNOSIS — Z32.00 ENCOUNTER FOR PREGNANCY TEST: Primary | ICD-10-CM

## 2018-04-13 DIAGNOSIS — I10 HYPERTENSION, BENIGN: ICD-10-CM

## 2018-04-13 LAB — SL AMB POCT URINE HCG: NEGATIVE

## 2018-04-13 PROCEDURE — 3074F SYST BP LT 130 MM HG: CPT | Performed by: FAMILY MEDICINE

## 2018-04-13 PROCEDURE — 81025 URINE PREGNANCY TEST: CPT | Performed by: FAMILY MEDICINE

## 2018-04-13 PROCEDURE — 99213 OFFICE O/P EST LOW 20 MIN: CPT | Performed by: FAMILY MEDICINE

## 2018-04-13 PROCEDURE — 3078F DIAST BP <80 MM HG: CPT | Performed by: FAMILY MEDICINE

## 2018-04-13 NOTE — PROGRESS NOTES
Assessment/Plan:    Problem List Items Addressed This Visit        Cardiovascular and Mediastinum    Hypertension, benign     Well controlled , continue amlodipine            Other    Dizziness     2 week hx, in the morning, untill she eats breakfast  No other symptoms  Drinking 6 bottles of water  Orthostatics normal, CBC, CMP         Relevant Orders    CBC and differential    Comprehensive metabolic panel    POCT urine HCG (Completed)    Encounter for pregnancy test - Primary     Period delayed 3-4 days  Wants to check pregnancy test  Pregnancy test negative               Subjective:      Patient ID: Saige Nunez is a 33 y o  female  Dizziness   This is a new problem  Episode onset: Two weeks  The problem occurs daily (In the morning when she wakes up before breakfast)  The problem has been unchanged  Pertinent negatives include no abdominal pain, arthralgias, chest pain, chills, congestion, fatigue, fever, headaches, joint swelling, nausea, sore throat, vomiting or weakness  Nothing aggravates the symptoms  She has tried eating for the symptoms  The treatment provided significant relief  The following portions of the patient's history were reviewed and updated as appropriate: allergies, current medications, past family history, past medical history, past social history, past surgical history and problem list     Review of Systems   Constitutional: Negative for activity change, appetite change, chills, fatigue and fever  HENT: Negative for congestion, rhinorrhea, sneezing and sore throat  Eyes: Negative for photophobia, discharge and visual disturbance  Respiratory: Negative for chest tightness, shortness of breath and wheezing  Cardiovascular: Negative for chest pain  Gastrointestinal: Negative for abdominal distention, abdominal pain, diarrhea, nausea and vomiting  Musculoskeletal: Negative for arthralgias and joint swelling  Skin: Negative for color change     Neurological: Positive for dizziness and light-headedness  Negative for tremors, seizures, syncope, facial asymmetry, weakness and headaches  Psychiatric/Behavioral: Negative for agitation  Objective:    Vitals:    04/13/18 1517   BP: 118/70   Pulse:    Resp:    Temp:         Physical Exam   Constitutional: She is oriented to person, place, and time  She appears well-developed and well-nourished  No distress  HENT:   Head: Normocephalic  Right Ear: External ear normal    Left Ear: External ear normal    Mouth/Throat: Oropharynx is clear and moist  No oropharyngeal exudate  Eyes: Conjunctivae are normal  Pupils are equal, round, and reactive to light  Neck: Normal range of motion  Cardiovascular: Normal rate, regular rhythm, normal heart sounds and intact distal pulses  Exam reveals no gallop and no friction rub  No murmur heard  Pulmonary/Chest: Effort normal and breath sounds normal  No respiratory distress  She has no wheezes  She has no rales  She exhibits no tenderness  Abdominal: Soft  She exhibits no distension and no mass  There is no tenderness  There is no rebound and no guarding  Musculoskeletal: Normal range of motion  She exhibits no edema, tenderness or deformity  Lymphadenopathy:     She has no cervical adenopathy  Neurological: She is alert and oriented to person, place, and time  She displays normal reflexes  No cranial nerve deficit  She exhibits normal muscle tone  Coordination normal    Skin: Skin is warm and dry  No rash noted  She is not diaphoretic  No pallor  Psychiatric: She has a normal mood and affect  Vitals reviewed

## 2018-04-13 NOTE — ASSESSMENT & PLAN NOTE
2 week hx, in the morning, untill she eats breakfast  No other symptoms   Drinking 6 bottles of water  Orthostatics normal, CBC, CMP

## 2018-05-29 ENCOUNTER — TELEPHONE (OUTPATIENT)
Dept: FAMILY MEDICINE CLINIC | Facility: CLINIC | Age: 33
End: 2018-05-29

## 2018-05-31 NOTE — TELEPHONE ENCOUNTER
Pt was seen for anxiety and evaluated at that time she did have a visit here in April but it was for somehting else  Her anxiety should be discussed every three months in order to evaluate the need for the med refills as requested  Please schedule her an appt   Thank you

## 2018-06-05 ENCOUNTER — APPOINTMENT (EMERGENCY)
Dept: CT IMAGING | Facility: HOSPITAL | Age: 33
End: 2018-06-05
Payer: COMMERCIAL

## 2018-06-05 ENCOUNTER — HOSPITAL ENCOUNTER (EMERGENCY)
Facility: HOSPITAL | Age: 33
Discharge: HOME | End: 2018-06-05
Attending: EMERGENCY MEDICINE | Admitting: EMERGENCY MEDICINE
Payer: COMMERCIAL

## 2018-06-05 VITALS
RESPIRATION RATE: 18 BRPM | TEMPERATURE: 98.2 F | DIASTOLIC BLOOD PRESSURE: 56 MMHG | HEART RATE: 57 BPM | WEIGHT: 260 LBS | BODY MASS INDEX: 44.63 KG/M2 | OXYGEN SATURATION: 100 % | SYSTOLIC BLOOD PRESSURE: 107 MMHG

## 2018-06-05 DIAGNOSIS — N20.0 KIDNEY STONE ON RIGHT SIDE: Primary | ICD-10-CM

## 2018-06-05 DIAGNOSIS — R42 DIZZINESS: ICD-10-CM

## 2018-06-05 LAB
ALBUMIN SERPL BCP-MCNC: 3.9 G/DL (ref 3.5–5)
ALP SERPL-CCNC: 105 U/L (ref 46–116)
ALT SERPL W P-5'-P-CCNC: 41 U/L (ref 12–78)
ANION GAP SERPL CALCULATED.3IONS-SCNC: 10 MMOL/L (ref 4–13)
AST SERPL W P-5'-P-CCNC: 31 U/L (ref 5–45)
BACTERIA UR QL AUTO: ABNORMAL /HPF
BASOPHILS # BLD AUTO: 0.02 THOUSANDS/ΜL (ref 0–0.1)
BASOPHILS NFR BLD AUTO: 0 % (ref 0–1)
BILIRUB DIRECT SERPL-MCNC: 0.1 MG/DL (ref 0–0.2)
BILIRUB SERPL-MCNC: 0.6 MG/DL (ref 0.2–1)
BILIRUB UR QL STRIP: NEGATIVE
BUN SERPL-MCNC: 12 MG/DL (ref 5–25)
CALCIUM SERPL-MCNC: 9 MG/DL (ref 8.3–10.1)
CHLORIDE SERPL-SCNC: 106 MMOL/L (ref 100–108)
CLARITY UR: CLEAR
CO2 SERPL-SCNC: 24 MMOL/L (ref 21–32)
COLOR UR: YELLOW
CREAT SERPL-MCNC: 0.65 MG/DL (ref 0.6–1.3)
EOSINOPHIL # BLD AUTO: 0.01 THOUSAND/ΜL (ref 0–0.61)
EOSINOPHIL NFR BLD AUTO: 0 % (ref 0–6)
ERYTHROCYTE [DISTWIDTH] IN BLOOD BY AUTOMATED COUNT: 15.8 % (ref 11.6–15.1)
EXT PREG TEST URINE: NEGATIVE
GFR SERPL CREATININE-BSD FRML MDRD: 119 ML/MIN/1.73SQ M
GLUCOSE SERPL-MCNC: 97 MG/DL (ref 65–140)
GLUCOSE UR STRIP-MCNC: NEGATIVE MG/DL
HCT VFR BLD AUTO: 39.6 % (ref 34.8–46.1)
HGB BLD-MCNC: 12.8 G/DL (ref 11.5–15.4)
HGB UR QL STRIP.AUTO: ABNORMAL
KETONES UR STRIP-MCNC: ABNORMAL MG/DL
LEUKOCYTE ESTERASE UR QL STRIP: ABNORMAL
LIPASE SERPL-CCNC: 99 U/L (ref 73–393)
LYMPHOCYTES # BLD AUTO: 1.55 THOUSANDS/ΜL (ref 0.6–4.47)
LYMPHOCYTES NFR BLD AUTO: 16 % (ref 14–44)
MCH RBC QN AUTO: 25.9 PG (ref 26.8–34.3)
MCHC RBC AUTO-ENTMCNC: 32.3 G/DL (ref 31.4–37.4)
MCV RBC AUTO: 80 FL (ref 82–98)
MONOCYTES # BLD AUTO: 0.39 THOUSAND/ΜL (ref 0.17–1.22)
MONOCYTES NFR BLD AUTO: 4 % (ref 4–12)
MUCOUS THREADS UR QL AUTO: ABNORMAL
NEUTROPHILS # BLD AUTO: 7.73 THOUSANDS/ΜL (ref 1.85–7.62)
NEUTS SEG NFR BLD AUTO: 80 % (ref 43–75)
NITRITE UR QL STRIP: NEGATIVE
NON-SQ EPI CELLS URNS QL MICRO: ABNORMAL /HPF
PH UR STRIP.AUTO: 6.5 [PH] (ref 4.5–8)
PLATELET # BLD AUTO: 329 THOUSANDS/UL (ref 149–390)
PMV BLD AUTO: 10.6 FL (ref 8.9–12.7)
POTASSIUM SERPL-SCNC: 3.8 MMOL/L (ref 3.5–5.3)
PROT SERPL-MCNC: 7.8 G/DL (ref 6.4–8.2)
PROT UR STRIP-MCNC: ABNORMAL MG/DL
RBC # BLD AUTO: 4.94 MILLION/UL (ref 3.81–5.12)
RBC #/AREA URNS AUTO: ABNORMAL /HPF
SODIUM SERPL-SCNC: 140 MMOL/L (ref 136–145)
SP GR UR STRIP.AUTO: 1.01 (ref 1–1.03)
UROBILINOGEN UR QL STRIP.AUTO: 1 E.U./DL
WBC # BLD AUTO: 9.7 THOUSAND/UL (ref 4.31–10.16)
WBC #/AREA URNS AUTO: ABNORMAL /HPF

## 2018-06-05 PROCEDURE — 99284 EMERGENCY DEPT VISIT MOD MDM: CPT

## 2018-06-05 PROCEDURE — 87086 URINE CULTURE/COLONY COUNT: CPT

## 2018-06-05 PROCEDURE — 36415 COLL VENOUS BLD VENIPUNCTURE: CPT | Performed by: EMERGENCY MEDICINE

## 2018-06-05 PROCEDURE — 81001 URINALYSIS AUTO W/SCOPE: CPT

## 2018-06-05 PROCEDURE — 81025 URINE PREGNANCY TEST: CPT | Performed by: EMERGENCY MEDICINE

## 2018-06-05 PROCEDURE — 74176 CT ABD & PELVIS W/O CONTRAST: CPT

## 2018-06-05 PROCEDURE — 96374 THER/PROPH/DIAG INJ IV PUSH: CPT

## 2018-06-05 PROCEDURE — 83690 ASSAY OF LIPASE: CPT | Performed by: EMERGENCY MEDICINE

## 2018-06-05 PROCEDURE — 85025 COMPLETE CBC W/AUTO DIFF WBC: CPT | Performed by: EMERGENCY MEDICINE

## 2018-06-05 PROCEDURE — 80076 HEPATIC FUNCTION PANEL: CPT | Performed by: EMERGENCY MEDICINE

## 2018-06-05 PROCEDURE — 80048 BASIC METABOLIC PNL TOTAL CA: CPT | Performed by: EMERGENCY MEDICINE

## 2018-06-05 RX ORDER — HYDROXYZINE HYDROCHLORIDE 25 MG/1
25 TABLET, FILM COATED ORAL EVERY 6 HOURS PRN
Qty: 20 TABLET | Refills: 0 | Status: SHIPPED | OUTPATIENT
Start: 2018-06-05 | End: 2018-06-28 | Stop reason: SDUPTHER

## 2018-06-05 RX ORDER — ONDANSETRON 4 MG/1
8 TABLET, ORALLY DISINTEGRATING ORAL EVERY 8 HOURS PRN
Qty: 10 TABLET | Refills: 3 | Status: SHIPPED | OUTPATIENT
Start: 2018-06-05 | End: 2018-06-25 | Stop reason: HOSPADM

## 2018-06-05 RX ORDER — MORPHINE SULFATE 10 MG/ML
6 INJECTION, SOLUTION INTRAMUSCULAR; INTRAVENOUS ONCE
Status: COMPLETED | OUTPATIENT
Start: 2018-06-05 | End: 2018-06-05

## 2018-06-05 RX ORDER — OXYCODONE HYDROCHLORIDE 5 MG/1
5 CAPSULE ORAL EVERY 6 HOURS PRN
Qty: 15 CAPSULE | Refills: 0 | Status: SHIPPED | OUTPATIENT
Start: 2018-06-05 | End: 2018-06-28 | Stop reason: ALTCHOICE

## 2018-06-05 RX ORDER — HYDROXYZINE HYDROCHLORIDE 25 MG/1
25 TABLET, FILM COATED ORAL ONCE
Status: COMPLETED | OUTPATIENT
Start: 2018-06-05 | End: 2018-06-05

## 2018-06-05 RX ADMIN — HYDROXYZINE HYDROCHLORIDE 25 MG: 25 TABLET, FILM COATED ORAL at 12:49

## 2018-06-05 RX ADMIN — MORPHINE SULFATE 6 MG: 10 INJECTION INTRAVENOUS at 10:28

## 2018-06-05 NOTE — DISCHARGE INSTRUCTIONS
DIAGNOSIS; RIGHT SIDED KIDNEY STONE -- 5 MM IN MID RIGHT URETER     - ACTIVITY/DIET AS TOLERATED    - EXPECT MORE PAIN AS STONE PASSES     - FOR PAIN- WOULD START WITH OVER THE COUNTER IBUPROFEN 400 MG - GET GENERIC- TAKEN TOGETHER WITH OVER THE COUNTER TYLENOL 500 MG- GET GENERIC- 4 TIMES A DAY WITH MEALS/ LIQUIDS OVER NEXT WEEK     - FOR SEVERE PAIN-  OXYCODONE START WITH 1 TABLET EVERY 4-6 HRS AS NEEDED AND CAN INCREASE TO 2 TABLETS EVERY 4 HRS AS NEEDED     - FOR NAUSEA/ VOMITING: ZOFRAN 2 TABLETS DISSOLVE IN THE MOUTH EVERY 6-8 HRS AS NEEDED     - IF YOU HAVE NOT PASSED THE STONE AFTER 1 WEEK - PLEASE CALL  T HE UROLOGIST TO SCHEDULE AN APPOINTMENT -- YOU CAN SEE ANY DOCTOR IN THE GROUP     - PLEASE RETURN TO  THE ER FOR ANY FEVERS- TEMP > 100 4 / ANY WORSENING/ INTRACTABLE PAIN/ ANY PERSISTENT VOMITING OR ANY NEW/ WORSENING/CONCERNING SYMPTOMS TO  YOU

## 2018-06-05 NOTE — ED PROVIDER NOTES
History  Chief Complaint   Patient presents with    Flank Pain     pt c/o right side flank pain  informed was bleeding last month seen by PCP for it and was told it was normal  flank pain started right after bleeding stopped yesterday  hx of kidney stones  33 yr female with hx of  r sided mostly upper abd pain for last month -- sometimes worse after meals--  No gerd/dyspepsia/ melena/ progressive dysphagia-- also with intermitent vag spotting for last month - seen by pmd for this- just finssihed reg mp -- today with acute r upper flank pain with n/v- similar to previous kidney stones- no fevers- no  urianry comps- no vag d/c        History provided by:  Patient   used: No        Prior to Admission Medications   Prescriptions Last Dose Informant Patient Reported? Taking? amLODIPine (NORVASC) 2 5 mg tablet  Self No No   Sig: Take 2 tablets by mouth daily      Facility-Administered Medications: None       Past Medical History:   Diagnosis Date    Anxiety     Gestational diabetes     Gestational diabetes     Hypertension        Past Surgical History:   Procedure Laterality Date    RENAL ARTERY STENT  2015       Family History   Problem Relation Age of Onset    Family history unknown: Yes     I have reviewed and agree with the history as documented  Social History   Substance Use Topics    Smoking status: Current Every Day Smoker     Packs/day: 1 00     Types: Cigarettes    Smokeless tobacco: Never Used    Alcohol use Yes      Comment: occasional        Review of Systems   Constitutional: Positive for appetite change  Negative for activity change, chills, diaphoresis, fatigue, fever and unexpected weight change  HENT: Negative  Eyes: Negative  Respiratory: Negative  Cardiovascular: Negative  Gastrointestinal: Positive for abdominal pain, nausea and vomiting  Negative for anal bleeding, blood in stool, constipation, diarrhea and rectal pain  Endocrine: Negative  Genitourinary: Positive for flank pain and vaginal bleeding  Negative for decreased urine volume, difficulty urinating, dyspareunia, dysuria, enuresis, frequency, genital sores, hematuria, menstrual problem, pelvic pain, urgency, vaginal discharge and vaginal pain  Musculoskeletal: Positive for back pain  Negative for arthralgias, gait problem, joint swelling, myalgias, neck pain and neck stiffness  Skin: Negative  Allergic/Immunologic: Negative  Neurological: Negative  Hematological: Negative  Psychiatric/Behavioral: Negative  Physical Exam  Physical Exam   Constitutional: She is oriented to person, place, and time  She appears well-developed and well-nourished  No distress  avss- pulse ox 99 % on ra- intepretation is n ormal- no intervention    HENT:   Head: Normocephalic and atraumatic  Eyes: Conjunctivae and EOM are normal  Pupils are equal, round, and reactive to light  Right eye exhibits no discharge  Left eye exhibits no discharge  No scleral icterus  Mm pink   Neck: Normal range of motion  Neck supple  No JVD present  No tracheal deviation present  No thyromegaly present  Cardiovascular: Normal rate, regular rhythm and intact distal pulses  Exam reveals no gallop and no friction rub  No murmur heard  Pulmonary/Chest: Effort normal and breath sounds normal  No stridor  No respiratory distress  She has no wheezes  She has no rales  She exhibits no tenderness  Abdominal: Soft  Bowel sounds are normal  She exhibits no distension and no mass  There is tenderness  There is no rebound and no guarding  No hernia  Pos ruq/ right upper flank pain/ r cva tendenress- no peritornal signs- soft abd    Musculoskeletal: Normal range of motion  She exhibits no edema, tenderness or deformity  Lymphadenopathy:     She has no cervical adenopathy  Neurological: She is alert and oriented to person, place, and time  No cranial nerve deficit or sensory deficit   She exhibits normal muscle tone  Coordination normal    Skin: Skin is warm  Capillary refill takes less than 2 seconds  No rash noted  She is not diaphoretic  No erythema  No pallor  Psychiatric: She has a normal mood and affect  Her behavior is normal  Judgment and thought content normal    Nursing note and vitals reviewed  Vital Signs  ED Triage Vitals [06/05/18 0922]   Temperature Pulse Respirations Blood Pressure SpO2   98 2 °F (36 8 °C) 72 18 137/85 99 %      Temp Source Heart Rate Source Patient Position - Orthostatic VS BP Location FiO2 (%)   Oral Monitor Sitting Left arm --      Pain Score       8           Vitals:    06/05/18 0922   BP: 137/85   Pulse: 72   Patient Position - Orthostatic VS: Sitting       Visual Acuity      ED Medications  Medications - No data to display    Diagnostic Studies  Results Reviewed     None                 No orders to display              Procedures  Procedures       Phone Contacts  ED Phone Contact    ED Course  ED Course as of Jun 05 1353   Tue Jun 05, 2018   1001 Er md procedure note for bedside transabd u/s by er md;  no gs seen// ? Mild right hydronephrosis only -- no fluid in ruq/luq/behind bladder    1109 Er md note- pt - re-evaluated- feels improved-- awaiting labs                                [facility]  CritCare Time    Disposition  Final diagnoses:   None     ED Disposition     None      Follow-up Information    None         Patient's Medications   Discharge Prescriptions    No medications on file     No discharge procedures on file      ED Provider  Electronically Signed by           Martin Casper MD  06/08/18 0132

## 2018-06-05 NOTE — ED NOTES
D/C instructions discussed  Educated on f/u, medication, and s/s of when to return to the ED  All questions answered  Ambulatory off unit with steady gait and no s/s of acute distress        Irma Sarmiento RN  06/05/18 [id number]

## 2018-06-06 ENCOUNTER — TELEPHONE (OUTPATIENT)
Dept: UROLOGY | Facility: AMBULATORY SURGERY CENTER | Age: 33
End: 2018-06-06

## 2018-06-06 NOTE — TELEPHONE ENCOUNTER
----- Message from Kiersten De Jesus MD sent at 6/5/2018  5:46 PM EDT -----    Kiersten De Jesus MD at 6/5/2018  1:57 PM     Status: Signed      Patient has 5 mm mid ureteral stone and needs o/v ASAP  Ok to see MD or AP      FT          ----- Message -----  From: Cristobal Perez MD  Sent: 6/5/2018   4:36 PM  To:  Kiersten De Jesus MD

## 2018-06-07 LAB — BACTERIA UR CULT: NORMAL

## 2018-06-11 ENCOUNTER — HOSPITAL ENCOUNTER (INPATIENT)
Facility: HOSPITAL | Age: 33
LOS: 2 days | Discharge: HOME | DRG: 463 | End: 2018-06-14
Attending: EMERGENCY MEDICINE | Admitting: INTERNAL MEDICINE
Payer: COMMERCIAL

## 2018-06-11 ENCOUNTER — OFFICE VISIT (OUTPATIENT)
Dept: FAMILY MEDICINE CLINIC | Facility: CLINIC | Age: 33
End: 2018-06-11
Payer: COMMERCIAL

## 2018-06-11 ENCOUNTER — APPOINTMENT (EMERGENCY)
Dept: CT IMAGING | Facility: HOSPITAL | Age: 33
DRG: 463 | End: 2018-06-11
Payer: COMMERCIAL

## 2018-06-11 ENCOUNTER — APPOINTMENT (EMERGENCY)
Dept: RADIOLOGY | Facility: HOSPITAL | Age: 33
DRG: 463 | End: 2018-06-11
Payer: COMMERCIAL

## 2018-06-11 VITALS
DIASTOLIC BLOOD PRESSURE: 80 MMHG | HEART RATE: 80 BPM | TEMPERATURE: 98.8 F | SYSTOLIC BLOOD PRESSURE: 110 MMHG | WEIGHT: 264.2 LBS | RESPIRATION RATE: 16 BRPM | BODY MASS INDEX: 45.11 KG/M2 | HEIGHT: 64 IN

## 2018-06-11 DIAGNOSIS — L55.9 SUNBURN: ICD-10-CM

## 2018-06-11 DIAGNOSIS — N39.0 UTI (URINARY TRACT INFECTION): ICD-10-CM

## 2018-06-11 DIAGNOSIS — N13.9 OBSTRUCTIVE UROPATHY: ICD-10-CM

## 2018-06-11 DIAGNOSIS — N20.0 NEPHROLITHIASIS: ICD-10-CM

## 2018-06-11 DIAGNOSIS — L55.9 BURN FROM THE SUN: Primary | ICD-10-CM

## 2018-06-11 DIAGNOSIS — R10.9 FLANK PAIN: Primary | ICD-10-CM

## 2018-06-11 DIAGNOSIS — F41.9 ANXIETY: ICD-10-CM

## 2018-06-11 LAB
ALBUMIN SERPL BCP-MCNC: 3.6 G/DL (ref 3.5–5)
ALP SERPL-CCNC: 107 U/L (ref 46–116)
ALT SERPL W P-5'-P-CCNC: 42 U/L (ref 12–78)
ANION GAP SERPL CALCULATED.3IONS-SCNC: 9 MMOL/L (ref 4–13)
AST SERPL W P-5'-P-CCNC: 23 U/L (ref 5–45)
BACTERIA UR QL AUTO: ABNORMAL /HPF
BASOPHILS # BLD AUTO: 0.02 THOUSANDS/ΜL (ref 0–0.1)
BASOPHILS NFR BLD AUTO: 0 % (ref 0–1)
BILIRUB SERPL-MCNC: 0.2 MG/DL (ref 0.2–1)
BILIRUB UR QL STRIP: NEGATIVE
BUN SERPL-MCNC: 8 MG/DL (ref 5–25)
CALCIUM SERPL-MCNC: 8.7 MG/DL (ref 8.3–10.1)
CAOX CRY URNS QL MICRO: ABNORMAL /HPF
CHLORIDE SERPL-SCNC: 107 MMOL/L (ref 100–108)
CLARITY UR: ABNORMAL
CO2 SERPL-SCNC: 26 MMOL/L (ref 21–32)
COLOR UR: ABNORMAL
CREAT SERPL-MCNC: 0.69 MG/DL (ref 0.6–1.3)
EOSINOPHIL # BLD AUTO: 0.53 THOUSAND/ΜL (ref 0–0.61)
EOSINOPHIL NFR BLD AUTO: 7 % (ref 0–6)
ERYTHROCYTE [DISTWIDTH] IN BLOOD BY AUTOMATED COUNT: 16.1 % (ref 11.6–15.1)
EXT PREG TEST URINE: NEGATIVE
GFR SERPL CREATININE-BSD FRML MDRD: 116 ML/MIN/1.73SQ M
GLUCOSE SERPL-MCNC: 88 MG/DL (ref 65–140)
GLUCOSE UR STRIP-MCNC: NEGATIVE MG/DL
HCT VFR BLD AUTO: 40.8 % (ref 34.8–46.1)
HGB BLD-MCNC: 13 G/DL (ref 11.5–15.4)
HGB UR QL STRIP.AUTO: ABNORMAL
KETONES UR STRIP-MCNC: ABNORMAL MG/DL
LEUKOCYTE ESTERASE UR QL STRIP: ABNORMAL
LYMPHOCYTES # BLD AUTO: 1.41 THOUSANDS/ΜL (ref 0.6–4.47)
LYMPHOCYTES NFR BLD AUTO: 18 % (ref 14–44)
MCH RBC QN AUTO: 25.8 PG (ref 26.8–34.3)
MCHC RBC AUTO-ENTMCNC: 31.9 G/DL (ref 31.4–37.4)
MCV RBC AUTO: 81 FL (ref 82–98)
MONOCYTES # BLD AUTO: 0.49 THOUSAND/ΜL (ref 0.17–1.22)
MONOCYTES NFR BLD AUTO: 6 % (ref 4–12)
NEUTROPHILS # BLD AUTO: 5.2 THOUSANDS/ΜL (ref 1.85–7.62)
NEUTS SEG NFR BLD AUTO: 68 % (ref 43–75)
NITRITE UR QL STRIP: POSITIVE
NON-SQ EPI CELLS URNS QL MICRO: ABNORMAL /HPF
PH UR STRIP.AUTO: 6 [PH] (ref 4.5–8)
PLATELET # BLD AUTO: 282 THOUSANDS/UL (ref 149–390)
PMV BLD AUTO: 11.2 FL (ref 8.9–12.7)
POTASSIUM SERPL-SCNC: 3.7 MMOL/L (ref 3.5–5.3)
PROT SERPL-MCNC: 7.1 G/DL (ref 6.4–8.2)
PROT UR STRIP-MCNC: ABNORMAL MG/DL
RBC # BLD AUTO: 5.03 MILLION/UL (ref 3.81–5.12)
RBC #/AREA URNS AUTO: ABNORMAL /HPF
SODIUM SERPL-SCNC: 142 MMOL/L (ref 136–145)
SP GR UR STRIP.AUTO: >=1.03 (ref 1–1.03)
UROBILINOGEN UR QL STRIP.AUTO: 1 E.U./DL
WBC # BLD AUTO: 7.65 THOUSAND/UL (ref 4.31–10.16)
WBC #/AREA URNS AUTO: ABNORMAL /HPF

## 2018-06-11 PROCEDURE — 81025 URINE PREGNANCY TEST: CPT | Performed by: EMERGENCY MEDICINE

## 2018-06-11 PROCEDURE — 80053 COMPREHEN METABOLIC PANEL: CPT | Performed by: EMERGENCY MEDICINE

## 2018-06-11 PROCEDURE — 85025 COMPLETE CBC W/AUTO DIFF WBC: CPT | Performed by: EMERGENCY MEDICINE

## 2018-06-11 PROCEDURE — 87086 URINE CULTURE/COLONY COUNT: CPT

## 2018-06-11 PROCEDURE — 81001 URINALYSIS AUTO W/SCOPE: CPT

## 2018-06-11 PROCEDURE — 99213 OFFICE O/P EST LOW 20 MIN: CPT | Performed by: FAMILY MEDICINE

## 2018-06-11 PROCEDURE — 1111F DSCHRG MED/CURRENT MED MERGE: CPT | Performed by: FAMILY MEDICINE

## 2018-06-11 PROCEDURE — 96360 HYDRATION IV INFUSION INIT: CPT

## 2018-06-11 PROCEDURE — 36415 COLL VENOUS BLD VENIPUNCTURE: CPT | Performed by: EMERGENCY MEDICINE

## 2018-06-11 PROCEDURE — 74018 RADEX ABDOMEN 1 VIEW: CPT

## 2018-06-11 PROCEDURE — 74176 CT ABD & PELVIS W/O CONTRAST: CPT

## 2018-06-11 RX ORDER — ALPRAZOLAM 0.5 MG/1
0.5 TABLET ORAL DAILY PRN
Qty: 4 TABLET | Refills: 0 | Status: SHIPPED | OUTPATIENT
Start: 2018-06-11 | End: 2018-06-25

## 2018-06-11 RX ORDER — NAPROXEN 250 MG/1
500 TABLET ORAL ONCE
Status: COMPLETED | OUTPATIENT
Start: 2018-06-11 | End: 2018-06-11

## 2018-06-11 RX ORDER — KETOROLAC TROMETHAMINE 30 MG/ML
30 INJECTION, SOLUTION INTRAMUSCULAR; INTRAVENOUS ONCE
Status: DISCONTINUED | OUTPATIENT
Start: 2018-06-11 | End: 2018-06-12

## 2018-06-11 RX ORDER — METHYLPREDNISOLONE 4 MG/1
TABLET ORAL
Qty: 21 TABLET | Refills: 0 | Status: SHIPPED | OUTPATIENT
Start: 2018-06-11 | End: 2018-06-25 | Stop reason: ALTCHOICE

## 2018-06-11 RX ORDER — FENTANYL CITRATE 50 UG/ML
50 INJECTION, SOLUTION INTRAMUSCULAR; INTRAVENOUS ONCE
Status: COMPLETED | OUTPATIENT
Start: 2018-06-11 | End: 2018-06-11

## 2018-06-11 RX ORDER — TAMSULOSIN HYDROCHLORIDE 0.4 MG/1
0.4 CAPSULE ORAL ONCE
Status: COMPLETED | OUTPATIENT
Start: 2018-06-11 | End: 2018-06-11

## 2018-06-11 RX ADMIN — CEFAZOLIN SODIUM 2000 MG: 2 SOLUTION INTRAVENOUS at 23:38

## 2018-06-11 RX ADMIN — TAMSULOSIN HYDROCHLORIDE 0.4 MG: 0.4 CAPSULE ORAL at 21:42

## 2018-06-11 RX ADMIN — SODIUM CHLORIDE 1000 ML: 0.9 INJECTION, SOLUTION INTRAVENOUS at 21:40

## 2018-06-11 RX ADMIN — NAPROXEN 500 MG: 250 TABLET ORAL at 21:42

## 2018-06-11 RX ADMIN — FENTANYL CITRATE 50 MCG: 50 INJECTION INTRAMUSCULAR; INTRAVENOUS at 23:32

## 2018-06-11 NOTE — LETTER
[facility]  [address] [address]  Dept: [phone]    June 14, 2018     Patient: Blake King   YOB: 1985   Date of Visit: 6/11/2018       To Whom it May Concern:    Kaye Chun is under my professional care  She was seen in the hospital from 6/11/2018   to 06/14/18  She may return to work on 6/18/18  If you have any questions or concerns, please don't hesitate to call           Sincerely,          Donald Rios MD

## 2018-06-11 NOTE — PROGRESS NOTES
Wilian Conroy 1985 female MRN: [id number]    Acute Visit      SUBJECTIVE  CC: Sunburn (PT has red bumps from the sun on chest, arms, back on scalp ) and Anxiety (Pt was given a different med at hospital )      HPI:  Wilian Conroy is a 33 y o  female who presented for an acute visit complaining of Sun burn over the weekend  Also wants Rx for anxiety  Requesting xanax prn pending psychiatrist visit in 1 month  Failed SSRI, using atarax tid with some benefit but has breakthrough anxiety due to home stress  Review of Systems   Respiratory: Negative for cough, choking, chest tightness and shortness of breath  Cardiovascular: Negative for chest pain and palpitations  Gastrointestinal: Negative for nausea and vomiting  Skin: Positive for rash  Neurological: Negative for dizziness  Psychiatric/Behavioral: Positive for sleep disturbance  Negative for hallucinations, self-injury and suicidal ideas  The patient is nervous/anxious          Historical Information   The patient history was reviewed as follows:  Past Medical History:   Diagnosis Date    Anxiety     Gestational diabetes     Gestational diabetes     Hypertension      Past Surgical History:   Procedure Laterality Date    RENAL ARTERY STENT  2015     Family History   Problem Relation Age of Onset    Family history unknown: Yes      Social History   History   Alcohol Use    Yes     Comment: occasional     History   Drug Use No     History   Smoking Status    Current Every Day Smoker    Packs/day: 1 00    Types: Cigarettes   Smokeless Tobacco    Never Used       Medications:   Meds/Allergies   Current Outpatient Prescriptions   Medication Sig Dispense Refill    amLODIPine (NORVASC) 2 5 mg tablet Take 2 tablets by mouth daily 30 tablet 6    hydrOXYzine HCL (ATARAX) 25 mg tablet Take 1 tablet (25 mg total) by mouth every 6 (six) hours as needed for itching or anxiety for up to 20 doses 20 tablet 0    ALPRAZolam (XANAX) 0 5 mg tablet Take 1 tablet (0 5 mg total) by mouth daily as needed for anxiety 4 tablet 0    Methylprednisolone 4 MG TBPK Use as directed on package 21 tablet 0    ondansetron (ZOFRAN ODT) 4 mg disintegrating tablet Take 2 tablets (8 mg total) by mouth every 8 (eight) hours as needed for nausea or vomiting for up to 10 doses 10 tablet 3    oxyCODONE (OXY-IR) 5 MG capsule Take 1 capsule (5 mg total) by mouth every 6 (six) hours as needed for severe pain for up to 15 doses Max Daily Amount: 20 mg 15 capsule 0     No current facility-administered medications for this visit  Allergies   Allergen Reactions    Penicillins Hives       OBJECTIVE  Vitals:   Vitals:    06/11/18 1355   BP: 110/80   Pulse: 80   Resp: 16   Temp: 98 8 °F (37 1 °C)   Weight: 120 kg (264 lb 3 2 oz)   Height: 5' 4 4" (1 636 m)       Invasive Devices          No matching active lines, drains, or airways          Physical Exam   Constitutional: She is oriented to person, place, and time  She appears well-developed and well-nourished  No distress  obesity   Cardiovascular: Normal rate, regular rhythm and normal heart sounds  No murmur heard  Pulmonary/Chest: Effort normal and breath sounds normal  No respiratory distress  She has no wheezes  She has no rales  Neurological: She is alert and oriented to person, place, and time  Coordination normal    Skin: Rash (erythematous macular rash on extensor surfaces of UE; erythematous papular vesicular rash on skin exposed area of upper chest and sporatic patches of scalp ) noted  She is not diaphoretic  Psychiatric:   Anxious, pressured speech        Lab:  I have personally reviewed all prior pertinent results     Admission on 06/05/2018, Discharged on 06/05/2018   Component Date Value Ref Range Status    WBC 06/05/2018 9 70  4 31 - 10 16 Thousand/uL Final    RBC 06/05/2018 4 94  3 81 - 5 12 Million/uL Final    Hemoglobin 06/05/2018 12 8  11 5 - 15 4 g/dL Final    Hematocrit 06/05/2018 39 6 34 8 - 46 1 % Final    MCV 06/05/2018 80* 82 - 98 fL Final    MCH 06/05/2018 25 9* 26 8 - 34 3 pg Final    MCHC 06/05/2018 32 3  31 4 - 37 4 g/dL Final    RDW 06/05/2018 15 8* 11 6 - 15 1 % Final    MPV 06/05/2018 10 6  8 9 - 12 7 fL Final    Platelets 69/14/4584 329  149 - 390 Thousands/uL Final    Neutrophils Relative 06/05/2018 80* 43 - 75 % Final    Lymphocytes Relative 06/05/2018 16  14 - 44 % Final    Monocytes Relative 06/05/2018 4  4 - 12 % Final    Eosinophils Relative 06/05/2018 0  0 - 6 % Final    Basophils Relative 06/05/2018 0  0 - 1 % Final    Neutrophils Absolute 06/05/2018 7 73* 1 85 - 7 62 Thousands/µL Final    Lymphocytes Absolute 06/05/2018 1 55  0 60 - 4 47 Thousands/µL Final    Monocytes Absolute 06/05/2018 0 39  0 17 - 1 22 Thousand/µL Final    Eosinophils Absolute 06/05/2018 0 01  0 00 - 0 61 Thousand/µL Final    Basophils Absolute 06/05/2018 0 02  0 00 - 0 10 Thousands/µL Final    Sodium 06/05/2018 140  136 - 145 mmol/L Final    Potassium 06/05/2018 3 8  3 5 - 5 3 mmol/L Final    Slightly Hemolyzed; Results May be Affected    Chloride 06/05/2018 106  100 - 108 mmol/L Final    CO2 06/05/2018 24  21 - 32 mmol/L Final    Anion Gap 06/05/2018 10  4 - 13 mmol/L Final    BUN 06/05/2018 12  5 - 25 mg/dL Final    Creatinine 06/05/2018 0 65  0 60 - 1 30 mg/dL Final    Standardized to IDMS reference method    Glucose 06/05/2018 97  65 - 140 mg/dL Final      If the patient is fasting, the ADA then defines impaired fasting glucose as > 100 mg/dL and diabetes as > or equal to 123 mg/dL  Specimen collection should occur prior to Sulfasalazine administration due to the potential for falsely depressed results  Specimen collection should occur prior to Sulfapyridine administration due to the potential for falsely elevated results      Calcium 06/05/2018 9 0  8 3 - 10 1 mg/dL Final    eGFR 06/05/2018 119  ml/min/1 73sq m Final    Lipase 06/05/2018 99  73 - 393 u/L Final    EXT PREG TEST UR (Ref: Negative) 06/05/2018 NEGATIVE   Final    Color, UA 06/05/2018 Yellow   Final    Clarity, UA 06/05/2018 Clear   Final    pH, UA 06/05/2018 6 5  4 5 - 8 0 Final    Leukocytes, UA 06/05/2018 Moderate* Negative Final    Nitrite, UA 06/05/2018 Negative  Negative Final    Protein, UA 06/05/2018 Trace* Negative mg/dl Final    Glucose, UA 06/05/2018 Negative  Negative mg/dl Final    Ketones, UA 06/05/2018 40 (2+)* Negative mg/dl Final    Urobilinogen, UA 06/05/2018 1 0  0 2, 1 0 E U /dl E U /dl Final    Bilirubin, UA 06/05/2018 Negative  Negative Final    Blood, UA 06/05/2018 Moderate* Negative Final    Specific Gravity, UA 06/05/2018 1 015  1 003 - 1 030 Final    RBC, UA 06/05/2018 4-10* None Seen, 0-5 /hpf Final    WBC, UA 06/05/2018 20-30* None Seen, 0-5, 5-55, 5-65 /hpf Final    Epithelial Cells 06/05/2018 Occasional  None Seen, Occasional /hpf Final    Bacteria, UA 06/05/2018 Occasional  None Seen, Occasional /hpf Final    MUCOUS THREADS 06/05/2018 Occasional* None Seen Final    Urine Culture 06/05/2018 >100,000 cfu/ml    Final    Mixed Contaminants X3    Total Bilirubin 06/05/2018 0 60  0 20 - 1 00 mg/dL Final    Bilirubin, Direct 06/05/2018 0 10  0 00 - 0 20 mg/dL Final    Slightly Hemolyzed; Results May be Affected    Alkaline Phosphatase 06/05/2018 105  46 - 116 U/L Final    AST 06/05/2018 31  5 - 45 U/L Final    Slightly Hemolyzed; Results May be Affected  Specimen collection should occur prior to Sulfasalazine administration due to the potential for falsely depressed results   ALT 06/05/2018 41  12 - 78 U/L Final      Specimen collection should occur prior to Sulfasalazine administration due to the potential for falsely depressed results       Total Protein 06/05/2018 7 8  6 4 - 8 2 g/dL Final    Albumin 06/05/2018 3 9  3 5 - 5 0 g/dL Final   ]      Assessment/Plan     Problem List Items Addressed This Visit     Anxiety - Primary    Relevant Medications    ALPRAZolam Sravan Martinez) 0 5 mg tablet #4 tablets for emergent usage  No refills  Burn from the sun    Relevant Medications    Methylprednisolone 4 MG TBPK        Also Rx OTC cocoa butter for topical usage bid  Body mass index is 44 79 kg/m²   -> F/u counseling on diet, weight loss, aerobic and weight-bearing exercise  PMH, stable, chronic  ->Continuing home medications as reviewed and reconciled  Disposition: Anticipate improvement in rash with sun avoidance, zinc oxide (reflective) sunscreen usage, covering of rash areas during healing, and steroids to decrease inflammation  PCP:   - Dr Leighann Elam, [facility]  Follow-up recommendations: PRN  No future appointments        _____________________________________________________________________   The attending physician, Dr Raad Alaniz, agreed with the plan      Gem Bustamante DO, PGY-3  [facility] Medicine   6/11/2018

## 2018-06-12 ENCOUNTER — ANESTHESIA EVENT (INPATIENT)
Dept: PERIOP | Facility: HOSPITAL | Age: 33
DRG: 463 | End: 2018-06-12
Payer: COMMERCIAL

## 2018-06-12 ENCOUNTER — TELEPHONE (OUTPATIENT)
Dept: OTHER | Facility: HOSPITAL | Age: 33
End: 2018-06-12

## 2018-06-12 ENCOUNTER — APPOINTMENT (INPATIENT)
Dept: RADIOLOGY | Facility: HOSPITAL | Age: 33
DRG: 463 | End: 2018-06-12
Payer: COMMERCIAL

## 2018-06-12 ENCOUNTER — PREP FOR PROCEDURE (OUTPATIENT)
Dept: UROLOGY | Facility: CLINIC | Age: 33
End: 2018-06-12

## 2018-06-12 ENCOUNTER — ANESTHESIA (INPATIENT)
Dept: PERIOP | Facility: HOSPITAL | Age: 33
DRG: 463 | End: 2018-06-12
Payer: COMMERCIAL

## 2018-06-12 DIAGNOSIS — N20.0 NEPHROLITHIASIS: Primary | ICD-10-CM

## 2018-06-12 PROBLEM — R10.9 FLANK PAIN: Status: ACTIVE | Noted: 2018-06-12

## 2018-06-12 PROBLEM — N13.9 OBSTRUCTIVE UROPATHY: Status: ACTIVE | Noted: 2018-06-11

## 2018-06-12 PROCEDURE — 99254 IP/OBS CNSLTJ NEW/EST MOD 60: CPT | Performed by: NURSE PRACTITIONER

## 2018-06-12 PROCEDURE — 0T768DZ DILATION OF RIGHT URETER WITH INTRALUMINAL DEVICE, VIA NATURAL OR ARTIFICIAL OPENING ENDOSCOPIC: ICD-10-PCS | Performed by: UROLOGY

## 2018-06-12 PROCEDURE — 74420 UROGRAPHY RTRGR +-KUB: CPT

## 2018-06-12 PROCEDURE — 52356 CYSTO/URETERO W/LITHOTRIPSY: CPT | Performed by: UROLOGY

## 2018-06-12 PROCEDURE — BT1DYZZ FLUOROSCOPY OF RIGHT KIDNEY, URETER AND BLADDER USING OTHER CONTRAST: ICD-10-PCS | Performed by: UROLOGY

## 2018-06-12 PROCEDURE — 99285 EMERGENCY DEPT VISIT HI MDM: CPT

## 2018-06-12 PROCEDURE — C2617 STENT, NON-COR, TEM W/O DEL: HCPCS | Performed by: UROLOGY

## 2018-06-12 PROCEDURE — 87086 URINE CULTURE/COLONY COUNT: CPT | Performed by: UROLOGY

## 2018-06-12 PROCEDURE — 99222 1ST HOSP IP/OBS MODERATE 55: CPT | Performed by: PHYSICIAN ASSISTANT

## 2018-06-12 DEVICE — STENT URETERAL 6FR 22CM INLAY OPTIMA W/NITINOL GDWR
Type: IMPLANTABLE DEVICE | Site: URETER | Status: NON-FUNCTIONAL
Removed: 2018-07-17

## 2018-06-12 RX ORDER — KETOROLAC TROMETHAMINE 30 MG/ML
15 INJECTION, SOLUTION INTRAMUSCULAR; INTRAVENOUS EVERY 6 HOURS PRN
Status: DISPENSED | OUTPATIENT
Start: 2018-06-12 | End: 2018-06-13

## 2018-06-12 RX ORDER — ONDANSETRON 2 MG/ML
4 INJECTION INTRAMUSCULAR; INTRAVENOUS EVERY 6 HOURS PRN
Status: DISCONTINUED | OUTPATIENT
Start: 2018-06-12 | End: 2018-06-13

## 2018-06-12 RX ORDER — FENTANYL CITRATE/PF 50 MCG/ML
50 SYRINGE (ML) INJECTION
Status: DISCONTINUED | OUTPATIENT
Start: 2018-06-12 | End: 2018-06-12 | Stop reason: HOSPADM

## 2018-06-12 RX ORDER — HYDROXYZINE HYDROCHLORIDE 25 MG/1
25 TABLET, FILM COATED ORAL EVERY 6 HOURS PRN
Status: DISCONTINUED | OUTPATIENT
Start: 2018-06-12 | End: 2018-06-14 | Stop reason: HOSPADM

## 2018-06-12 RX ORDER — SODIUM CHLORIDE 9 MG/ML
100 INJECTION, SOLUTION INTRAVENOUS CONTINUOUS
Status: DISCONTINUED | OUTPATIENT
Start: 2018-06-12 | End: 2018-06-13

## 2018-06-12 RX ORDER — OXYCODONE HYDROCHLORIDE AND ACETAMINOPHEN 5; 325 MG/1; MG/1
1 TABLET ORAL EVERY 4 HOURS PRN
Qty: 10 TABLET | Refills: 0 | Status: SHIPPED | OUTPATIENT
Start: 2018-06-12 | End: 2018-06-17

## 2018-06-12 RX ORDER — DIPHENHYDRAMINE HYDROCHLORIDE 50 MG/ML
12.5 INJECTION INTRAMUSCULAR; INTRAVENOUS ONCE AS NEEDED
Status: DISCONTINUED | OUTPATIENT
Start: 2018-06-12 | End: 2018-06-12 | Stop reason: HOSPADM

## 2018-06-12 RX ORDER — LEVALBUTEROL 1.25 MG/.5ML
1.25 SOLUTION, CONCENTRATE RESPIRATORY (INHALATION) ONCE
Status: COMPLETED | OUTPATIENT
Start: 2018-06-12 | End: 2018-06-12

## 2018-06-12 RX ORDER — METOCLOPRAMIDE HYDROCHLORIDE 5 MG/ML
INJECTION INTRAMUSCULAR; INTRAVENOUS AS NEEDED
Status: DISCONTINUED | OUTPATIENT
Start: 2018-06-12 | End: 2018-06-12 | Stop reason: SURG

## 2018-06-12 RX ORDER — TAMSULOSIN HYDROCHLORIDE 0.4 MG/1
0.4 CAPSULE ORAL
Qty: 30 CAPSULE | Refills: 0 | Status: SHIPPED | OUTPATIENT
Start: 2018-06-12 | End: 2018-06-25

## 2018-06-12 RX ORDER — SCOLOPAMINE TRANSDERMAL SYSTEM 1 MG/1
1 PATCH, EXTENDED RELEASE TRANSDERMAL
Status: DISCONTINUED | OUTPATIENT
Start: 2018-06-12 | End: 2018-06-13

## 2018-06-12 RX ORDER — KETOROLAC TROMETHAMINE 30 MG/ML
INJECTION, SOLUTION INTRAMUSCULAR; INTRAVENOUS AS NEEDED
Status: DISCONTINUED | OUTPATIENT
Start: 2018-06-12 | End: 2018-06-12 | Stop reason: SURG

## 2018-06-12 RX ORDER — OXYBUTYNIN CHLORIDE 5 MG/1
5 TABLET ORAL 2 TIMES DAILY PRN
Qty: 60 TABLET | Refills: 0 | Status: SHIPPED | OUTPATIENT
Start: 2018-06-12 | End: 2018-06-25

## 2018-06-12 RX ORDER — ONDANSETRON 2 MG/ML
4 INJECTION INTRAMUSCULAR; INTRAVENOUS ONCE AS NEEDED
Status: DISCONTINUED | OUTPATIENT
Start: 2018-06-12 | End: 2018-06-12 | Stop reason: HOSPADM

## 2018-06-12 RX ORDER — OXYBUTYNIN CHLORIDE 5 MG/1
5 TABLET ORAL 3 TIMES DAILY PRN
Status: DISCONTINUED | OUTPATIENT
Start: 2018-06-12 | End: 2018-06-13

## 2018-06-12 RX ORDER — PROPOFOL 10 MG/ML
INJECTION, EMULSION INTRAVENOUS CONTINUOUS PRN
Status: DISCONTINUED | OUTPATIENT
Start: 2018-06-12 | End: 2018-06-12 | Stop reason: SURG

## 2018-06-12 RX ORDER — CEFAZOLIN SODIUM 1 G/3ML
INJECTION, POWDER, FOR SOLUTION INTRAMUSCULAR; INTRAVENOUS AS NEEDED
Status: DISCONTINUED | OUTPATIENT
Start: 2018-06-12 | End: 2018-06-12 | Stop reason: SURG

## 2018-06-12 RX ORDER — MIDAZOLAM HYDROCHLORIDE 1 MG/ML
INJECTION INTRAMUSCULAR; INTRAVENOUS AS NEEDED
Status: DISCONTINUED | OUTPATIENT
Start: 2018-06-12 | End: 2018-06-12 | Stop reason: SURG

## 2018-06-12 RX ORDER — PROPOFOL 10 MG/ML
INJECTION, EMULSION INTRAVENOUS AS NEEDED
Status: DISCONTINUED | OUTPATIENT
Start: 2018-06-12 | End: 2018-06-12 | Stop reason: SURG

## 2018-06-12 RX ORDER — DOCUSATE SODIUM 100 MG/1
100 CAPSULE, LIQUID FILLED ORAL 3 TIMES DAILY
Qty: 90 CAPSULE | Refills: 0 | Status: SHIPPED | OUTPATIENT
Start: 2018-06-12 | End: 2018-06-25

## 2018-06-12 RX ORDER — FENTANYL CITRATE 50 UG/ML
INJECTION, SOLUTION INTRAMUSCULAR; INTRAVENOUS AS NEEDED
Status: DISCONTINUED | OUTPATIENT
Start: 2018-06-12 | End: 2018-06-12 | Stop reason: SURG

## 2018-06-12 RX ORDER — OXYCODONE HYDROCHLORIDE 5 MG/1
5 TABLET ORAL EVERY 6 HOURS PRN
Status: DISCONTINUED | OUTPATIENT
Start: 2018-06-12 | End: 2018-06-14 | Stop reason: HOSPADM

## 2018-06-12 RX ORDER — ALBUTEROL SULFATE 90 UG/1
AEROSOL, METERED RESPIRATORY (INHALATION) AS NEEDED
Status: DISCONTINUED | OUTPATIENT
Start: 2018-06-12 | End: 2018-06-12 | Stop reason: SURG

## 2018-06-12 RX ORDER — ONDANSETRON 2 MG/ML
INJECTION INTRAMUSCULAR; INTRAVENOUS AS NEEDED
Status: DISCONTINUED | OUTPATIENT
Start: 2018-06-12 | End: 2018-06-12 | Stop reason: SURG

## 2018-06-12 RX ORDER — IODINE/SODIUM IODIDE 2 %
TINCTURE TOPICAL AS NEEDED
Status: DISCONTINUED | OUTPATIENT
Start: 2018-06-12 | End: 2018-06-14 | Stop reason: HOSPADM

## 2018-06-12 RX ORDER — PROMETHAZINE HYDROCHLORIDE 25 MG/ML
25 INJECTION, SOLUTION INTRAMUSCULAR; INTRAVENOUS ONCE AS NEEDED
Status: DISCONTINUED | OUTPATIENT
Start: 2018-06-12 | End: 2018-06-12 | Stop reason: HOSPADM

## 2018-06-12 RX ORDER — SUCCINYLCHOLINE/SOD CL,ISO/PF 100 MG/5ML
SYRINGE (ML) INTRAVENOUS AS NEEDED
Status: DISCONTINUED | OUTPATIENT
Start: 2018-06-12 | End: 2018-06-12 | Stop reason: SURG

## 2018-06-12 RX ORDER — AMLODIPINE BESYLATE 5 MG/1
5 TABLET ORAL DAILY
Status: DISCONTINUED | OUTPATIENT
Start: 2018-06-12 | End: 2018-06-14 | Stop reason: HOSPADM

## 2018-06-12 RX ORDER — ACETAMINOPHEN 325 MG/1
650 TABLET ORAL EVERY 6 HOURS PRN
Status: DISCONTINUED | OUTPATIENT
Start: 2018-06-12 | End: 2018-06-14 | Stop reason: HOSPADM

## 2018-06-12 RX ORDER — NICOTINE 21 MG/24HR
1 PATCH, TRANSDERMAL 24 HOURS TRANSDERMAL DAILY
Status: DISCONTINUED | OUTPATIENT
Start: 2018-06-12 | End: 2018-06-14 | Stop reason: HOSPADM

## 2018-06-12 RX ADMIN — Medication 200 MG: at 16:09

## 2018-06-12 RX ADMIN — MIDAZOLAM HYDROCHLORIDE 2 MG: 1 INJECTION, SOLUTION INTRAMUSCULAR; INTRAVENOUS at 16:05

## 2018-06-12 RX ADMIN — PROPOFOL 120 MCG/KG/MIN: 10 INJECTION, EMULSION INTRAVENOUS at 16:13

## 2018-06-12 RX ADMIN — HYDROXYZINE HYDROCHLORIDE 25 MG: 25 TABLET, FILM COATED ORAL at 14:45

## 2018-06-12 RX ADMIN — KETOROLAC TROMETHAMINE 30 MG: 30 INJECTION, SOLUTION INTRAMUSCULAR at 16:33

## 2018-06-12 RX ADMIN — ALBUTEROL SULFATE 5 PUFF: 90 AEROSOL, METERED RESPIRATORY (INHALATION) at 16:40

## 2018-06-12 RX ADMIN — SODIUM CHLORIDE 100 ML/HR: 0.9 INJECTION, SOLUTION INTRAVENOUS at 09:27

## 2018-06-12 RX ADMIN — KETOROLAC TROMETHAMINE 15 MG: 30 INJECTION, SOLUTION INTRAMUSCULAR at 01:42

## 2018-06-12 RX ADMIN — FENTANYL CITRATE 50 MCG: 50 INJECTION INTRAMUSCULAR; INTRAVENOUS at 16:09

## 2018-06-12 RX ADMIN — PROPOFOL 200 MG: 10 INJECTION, EMULSION INTRAVENOUS at 16:09

## 2018-06-12 RX ADMIN — LIDOCAINE HYDROCHLORIDE 100 MG: 20 INJECTION, SOLUTION INTRAVENOUS at 16:09

## 2018-06-12 RX ADMIN — OXYCODONE HYDROCHLORIDE 5 MG: 5 TABLET ORAL at 09:23

## 2018-06-12 RX ADMIN — ONDANSETRON 4 MG: 2 INJECTION INTRAMUSCULAR; INTRAVENOUS at 01:42

## 2018-06-12 RX ADMIN — CEFAZOLIN SODIUM 2000 MG: 2 SOLUTION INTRAVENOUS at 06:35

## 2018-06-12 RX ADMIN — ONDANSETRON 4 MG: 2 INJECTION INTRAMUSCULAR; INTRAVENOUS at 19:04

## 2018-06-12 RX ADMIN — FERRIC OXIDE RED 1 APPLICATION: 8; 8 LOTION TOPICAL at 19:12

## 2018-06-12 RX ADMIN — DEXAMETHASONE SODIUM PHOSPHATE 4 MG: 10 INJECTION INTRAMUSCULAR; INTRAVENOUS at 16:15

## 2018-06-12 RX ADMIN — ONDANSETRON 4 MG: 2 INJECTION INTRAMUSCULAR; INTRAVENOUS at 16:29

## 2018-06-12 RX ADMIN — CEFAZOLIN SODIUM 3000 MG: 1 INJECTION, POWDER, FOR SOLUTION INTRAMUSCULAR; INTRAVENOUS at 16:18

## 2018-06-12 RX ADMIN — METOCLOPRAMIDE HYDROCHLORIDE 10 MG: 5 INJECTION INTRAMUSCULAR; INTRAVENOUS at 16:27

## 2018-06-12 RX ADMIN — SCOPALAMINE 1 PATCH: 1 PATCH, EXTENDED RELEASE TRANSDERMAL at 15:55

## 2018-06-12 RX ADMIN — PROPOFOL 50 MG: 10 INJECTION, EMULSION INTRAVENOUS at 16:25

## 2018-06-12 RX ADMIN — LEVALBUTEROL HYDROCHLORIDE 1.25 MG: 1.25 SOLUTION, CONCENTRATE RESPIRATORY (INHALATION) at 16:57

## 2018-06-12 RX ADMIN — HYDROMORPHONE HYDROCHLORIDE 0.5 MG: 1 INJECTION, SOLUTION INTRAMUSCULAR; INTRAVENOUS; SUBCUTANEOUS at 14:17

## 2018-06-12 RX ADMIN — CEFAZOLIN SODIUM 2000 MG: 2 SOLUTION INTRAVENOUS at 14:17

## 2018-06-12 NOTE — ANESTHESIA PREPROCEDURE EVALUATION
Review of Systems/Medical History  Patient summary reviewed  Chart reviewed  History of anesthetic complications (  + motion sickness) PONV    Cardiovascular  Hypertension ,    Pulmonary  Smoker cigarette smoker  , Tobacco cessation counseling given ,        GI/Hepatic  Negative GI/hepatic ROS          Kidney stones,        Endo/Other  Diabetes gestational ,   Comment: Sunburn with blistering on chest and arms Obesity  morbid obesity   GYN  Negative gynecology ROS          Hematology  Negative hematology ROS      Musculoskeletal  Negative musculoskeletal ROS        Neurology  Negative neurology ROS      Psychology   Anxiety,          pt said she doesn't know what her rxn to PCN is, she states that her mom said she had a rxn in her childhood but "doesn't think it was anything"    Physical Exam    Airway    Mallampati score: II  TM Distance: >3 FB  Neck ROM: full     Dental       Cardiovascular  Cardiovascular exam normal    Pulmonary  Pulmonary exam normal     Other Findings        Anesthesia Plan  ASA Score- 3 Emergent    Anesthesia Type- general with ASA Monitors  Additional Monitors:   Airway Plan: ETT  Comment: TIVA, scop patch  Plan Factors-    Induction- intravenous  Postoperative Plan-     Informed Consent- Anesthetic plan and risks discussed with patient  I personally reviewed this patient with the CRNA  Discussed and agreed on the Anesthesia Plan with the CRNA  Andrea Wakefield

## 2018-06-12 NOTE — ED NOTES
Patient transported to [address] [place] [address], [address] [place] [address]  06/11/18 9968

## 2018-06-12 NOTE — MALNUTRITION/BMI
This medical record reflects one or more clinical indicators suggestive of malnutrition and/or morbid obesity  BMI Findings:  BMI Classifications: Morbid Obesity 45-49 9     Body mass index is 45 03 kg/m²  See Nutrition note dated 6/12/2018  for additional details  Completed nutrition assessment is viewable in the nutrition documentation      Morbid obesity

## 2018-06-12 NOTE — TELEPHONE ENCOUNTER
The patient is status post cystoscopy and right ureteral stent placement, 6 [place] by 22 centimeter  She will require staged ureteroscopy in the future

## 2018-06-12 NOTE — ED PROVIDER NOTES
History  Chief Complaint   Patient presents with    Flank Pain     Pt was dx with R sided kidney stone on tuesday of last week  Pt reports having worsening pain over R flank  Pt c/o R flank pain and decreased appetite  Denies N/V      Sunburn     Pt reports she was sent by her PCP for eval of the "first or second degree sun burn"  Pt has sun burn on chest, head and BL arms  18-year-old female presents today with 2 chief complaints  The 1st is right flank pain  She was diagnosed with a kidney stone 7 days ago  States the pain was initially improving but is now worsening  Has been taking OxyContin for pain which was helping until today  She also presents complaining of pain from a sunburn on her chest   States she was sent by her PCP for evaluation of a second-degree burn  She was prescribed a steroid and told to put some kind of cream on it but she does not know what and they steroids did not come through the pharmacy at so she came here to be evaluated  History provided by:  Patient  Flank Pain   Pain location:  R flank  Pain quality: aching    Pain radiates to:  Does not radiate  Pain severity:  Moderate  Onset quality:  Unable to specify  Duration:  1 week  Timing:  Constant  Progression:  Waxing and waning  Chronicity:  New  Context comment:  Recently diagnosed with a ureteral stone  Relieved by:  None tried  Worsened by:  Nothing  Ineffective treatments:  None tried  Associated symptoms: no anorexia, no belching, no chest pain, no chills, no constipation, no cough, no diarrhea, no dysuria, no fatigue, no fever, no flatus, no hematemesis, no hematochezia, no hematuria, no melena, no nausea, no shortness of breath, no sore throat, no vaginal bleeding, no vaginal discharge and no vomiting    Risk factors: obesity    Risk factors: not elderly and not pregnant        Prior to Admission Medications   Prescriptions Last Dose Informant Patient Reported? Taking?    ALPRAZolam (XANAX) 0 5 mg tablet   No No   Sig: Take 1 tablet (0 5 mg total) by mouth daily as needed for anxiety   Methylprednisolone 4 MG TBPK   No No   Sig: Use as directed on package   amLODIPine (NORVASC) 2 5 mg tablet  Self No No   Sig: Take 2 tablets by mouth daily   hydrOXYzine HCL (ATARAX) 25 mg tablet   No No   Sig: Take 1 tablet (25 mg total) by mouth every 6 (six) hours as needed for itching or anxiety for up to 20 doses   ondansetron (ZOFRAN ODT) 4 mg disintegrating tablet   No No   Sig: Take 2 tablets (8 mg total) by mouth every 8 (eight) hours as needed for nausea or vomiting for up to 10 doses   oxyCODONE (OXY-IR) 5 MG capsule   No No   Sig: Take 1 capsule (5 mg total) by mouth every 6 (six) hours as needed for severe pain for up to 15 doses Max Daily Amount: 20 mg      Facility-Administered Medications: None       Past Medical History:   Diagnosis Date    Anxiety     Gestational diabetes     Gestational diabetes     Hypertension        Past Surgical History:   Procedure Laterality Date    RENAL ARTERY STENT  2015       Family History   Problem Relation Age of Onset    Family history unknown: Yes     I have reviewed and agree with the history as documented  Social History   Substance Use Topics    Smoking status: Current Every Day Smoker     Packs/day: 1 00     Types: Cigarettes    Smokeless tobacco: Never Used    Alcohol use Yes      Comment: occasional        Review of Systems   Constitutional: Negative for chills, fatigue and fever  HENT: Negative for sore throat  Respiratory: Negative for cough and shortness of breath  Cardiovascular: Negative for chest pain  Gastrointestinal: Negative for anorexia, constipation, diarrhea, flatus, hematemesis, hematochezia, melena, nausea and vomiting  Genitourinary: Positive for flank pain  Negative for dysuria, hematuria, vaginal bleeding and vaginal discharge  Musculoskeletal: Negative for back pain  Skin: Positive for rash     Allergic/Immunologic: Negative for immunocompromised state  Neurological: Negative for dizziness and headaches  Physical Exam  Physical Exam   Constitutional: She is oriented to person, place, and time  She appears well-developed and well-nourished  HENT:   Head: Normocephalic and atraumatic  Mouth/Throat: Uvula is midline, oropharynx is clear and moist and mucous membranes are normal  No tonsillar exudate  Eyes: Pupils are equal, round, and reactive to light  Neck: Normal range of motion  Neck supple  Cardiovascular: Normal rate and regular rhythm  Pulmonary/Chest: Effort normal and breath sounds normal    Abdominal: Soft  Bowel sounds are normal  There is no tenderness  There is no rebound and no guarding  Morbid obesity makes physical exam technically difficult, patient does not have any abdominal tenderness, rebound, or guarding by my exam    Musculoskeletal: Normal range of motion  Neurological: She is alert and oriented to person, place, and time  Patient moving all extremities equally, no focal neuro deficits noted  Skin: Skin is warm and dry  Rash noted  Patient has erythema and blistering on the anterior chest wall and upper shoulders/arms consistent with recent   Psychiatric: She has a normal mood and affect  Nursing note and vitals reviewed        Vital Signs  ED Triage Vitals [06/11/18 1932]   Temperature Pulse Respirations Blood Pressure SpO2   98 6 °F (37 °C) 82 16 143/81 97 %      Temp Source Heart Rate Source Patient Position - Orthostatic VS BP Location FiO2 (%)   Oral Monitor Sitting Left arm --      Pain Score       8           Vitals:    06/11/18 1932 06/11/18 2234   BP: 143/81 118/54   Pulse: 82 65   Patient Position - Orthostatic VS: Sitting Sitting       Visual Acuity      ED Medications  Medications   ceFAZolin (ANCEF) IVPB (premix) 2,000 mg (not administered)   fentanyl citrate (PF) 100 MCG/2ML 50 mcg (not administered)   ketorolac (TORADOL) injection 30 mg (not administered)   sodium chloride 0 9 % bolus 1,000 mL (1,000 mL Intravenous New Bag 6/11/18 2140)   tamsulosin (FLOMAX) capsule 0 4 mg (0 4 mg Oral Given 6/11/18 2142)   naproxen (NAPROSYN) tablet 500 mg (500 mg Oral Given 6/11/18 2142)       Diagnostic Studies  Results Reviewed     Procedure Component Value Units Date/Time    Comprehensive metabolic panel [[id number]] Collected:  06/11/18 2140    Lab Status:  Final result Specimen:  Blood from Arm, Right Updated:  06/11/18 2224     Sodium 142 mmol/L      Potassium 3 7 mmol/L      Chloride 107 mmol/L      CO2 26 mmol/L      Anion Gap 9 mmol/L      BUN 8 mg/dL      Creatinine 0 69 mg/dL      Glucose 88 mg/dL      Calcium 8 7 mg/dL      AST 23 U/L      ALT 42 U/L      Alkaline Phosphatase 107 U/L      Total Protein 7 1 g/dL      Albumin 3 6 g/dL      Total Bilirubin 0 20 mg/dL      eGFR 116 ml/min/1 73sq m     Narrative:         National Kidney Disease Education Program recommendations are as follows:  GFR calculation is accurate only with a steady state creatinine  Chronic Kidney disease less than 60 ml/min/1 73 sq  meters  Kidney failure less than 15 ml/min/1 73 sq  meters  Urine Microscopic [[id number]]  (Abnormal) Collected:  06/11/18 2141    Lab Status:  Final result Specimen:  Urine from Urine, Clean Catch Updated:  06/11/18 2210     RBC, UA 20-30 (A) /hpf      WBC, UA Innumerable (A) /hpf      Epithelial Cells Innumerable (A) /hpf      Bacteria, UA Innumerable (A) /hpf      Ca Oxalate Nereyda, UA Occasional (A) /hpf     Urine culture [[id number]] Collected:  06/11/18 2141    Lab Status:   In process Specimen:  Urine from Urine, Clean Catch Updated:  06/11/18 2210    CBC and differential [[id number]]  (Abnormal) Collected:  06/11/18 2140    Lab Status:  Final result Specimen:  Blood from Arm, Right Updated:  06/11/18 2202     WBC 7 65 Thousand/uL      RBC 5 03 Million/uL      Hemoglobin 13 0 g/dL      Hematocrit 40 8 %      MCV 81 (L) fL      MCH 25 8 (L) pg      MCHC 31 9 g/dL      RDW 16 1 (H) %      MPV 11 2 fL      Platelets 419 Thousands/uL      Neutrophils Relative 68 %      Lymphocytes Relative 18 %      Monocytes Relative 6 %      Eosinophils Relative 7 (H) %      Basophils Relative 0 %      Neutrophils Absolute 5 20 Thousands/µL      Lymphocytes Absolute 1 41 Thousands/µL      Monocytes Absolute 0 49 Thousand/µL      Eosinophils Absolute 0 53 Thousand/µL      Basophils Absolute 0 02 Thousands/µL     POCT pregnancy, urine [[id number]]  (Normal) Resulted:  06/11/18 2143    Lab Status:  Final result Updated:  06/11/18 2143     EXT PREG TEST UR (Ref: Negative) negative    ED Urine Macroscopic [[id number]]  (Abnormal) Collected:  06/11/18 2141    Lab Status:  Final result Specimen:  Urine Updated:  06/11/18 2137     Color, UA Orange     Clarity, UA Cloudy     pH, UA 6 0     Leukocytes, UA Small (A)     Nitrite, UA Positive (A)     Protein,  (2+) (A) mg/dl      Glucose, UA Negative mg/dl      Ketones, UA Trace (A) mg/dl      Urobilinogen, UA 1 0 E U /dl      Bilirubin, UA Negative     Blood, UA Small (A)     Specific Gravity, UA >=1 030    Narrative:       [facility] RESULT                 CT renal stone study abdomen pelvis without contrast   Final Result by Won Trevino MD (06/11 2235)         1  Previously noted 5 mm obstructing right ureteral calculus has progressed distally by approximately 3-5 cm but remains and still causes right hydroureteronephrosis  2   Other nonobstructing unchanged right renal subcentimeter calculi  3   Hepatic steatosis  Workstation performed: [id number]         XR abdomen 1 view kub    (Results Pending)              Procedures  Procedures       Phone Contacts  ED Phone Contact    ED Course                               MDM  Number of Diagnoses or Management Options  Flank pain: new and requires workup  Sunburn: minor  Diagnosis management comments: 10:16 PM  Urine came back positive for nitrites and WBCs    Patient states she 'always tests positive for UTIs'   I looked back and every culture she has had has been >100 000 colonies of mixed contaminant  My concern is that now with a possible obstructive stone, this could cause significant pathology if she has a UTI  Patient does not appear toxic  Vitals are normal   Labs are pending  I expressed this concern to the patient and told her the only way to know for sure is a CT scan  We discussed the pros and cons of the scan and the patient would prefer if we do it  11:01 PM  CT scan shows:  1  Previously noted 5 mm obstructing right ureteral calculus has progressed distally by approximately 3-5 cm but remains and still causes right hydroureteronephrosis  2   Other nonobstructing unchanged right renal subcentimeter calculi  3   Hepatic steatosis  D/w urology PA who recommended admission if the patient is agreeable  I discussed possible discharge vs admission with patient and she states that her pain is still not controlled and would be agreeable to admission  MDM: Patient presents to the Emergency Department and was diagnosed with acute obstructive uropathy and UTI  This is a new problem that will require additional planned work-up in a hospitalized setting  Clinical laboratory testing, radiology imaging, and medical testing (EKG) were ordered  I independently reviewed the radiologic imaging, EKG, and laboratory studies  This case is considered high risk secondary to the above listed disease process that poses a threat to bodily function that requires further diagnostic testing and management which may include the administration of parenteral controlled substances  Discussed with PUJA  We had a detailed discussion of the patient's condition and case,  including need for admission  Accepts to his/her service  Bed request/bridging orders placed                     Amount and/or Complexity of Data Reviewed  Clinical lab tests: ordered and reviewed  Tests in the radiology section of CPT®: ordered and reviewed  Tests in the medicine section of CPT®: reviewed and ordered  Decide to obtain previous medical records or to obtain history from someone other than the patient: yes  Review and summarize past medical records: yes  Discuss the patient with other providers: yes  Independent visualization of images, tracings, or specimens: yes    Risk of Complications, Morbidity, and/or Mortality  Presenting problems: high  Diagnostic procedures: high  Management options: high    Patient Progress  Patient progress: stable    CritCare Time    Disposition  Final diagnoses:   Flank pain   Sunburn     Time reflects when diagnosis was documented in both MDM as applicable and the Disposition within this note     Time User Action Codes Description Comment    6/11/2018 10:19 PM Franko Montana Add [R10 9] Flank pain     6/11/2018 10:19 PM Jaime Blanco Se       ED Disposition     None      Follow-up Information    None         Patient's Medications   Discharge Prescriptions    No medications on file     No discharge procedures on file      ED Provider  Electronically Signed by           Teodoro Emmanuel DO  06/11/18 4095

## 2018-06-12 NOTE — CONSULTS
CONSULT    Patient Name: Dorian Kelley  Patient MRN: [id number]  Date of Service: 6/12/2018   Date / Time Note Created: 6/12/2018 8:54 AM   Referring Provider:  Clay Can MD  Provider Creating Note: SYLVESTER Tsang    PCP: Anna Stringer  Attending Provider:  Clay Can MD    Reason for Consult: Flank Pain     History of present illness:   Ms Violeta Phillips  Is a 80-year-old female was seen at [facility] Emergency room June 5th with chief complaint of right flank pain and found to have a right 5 mm obstructing ureteral calculus  Pain  Control was adequately achieved and patient was otherwise nontoxic and therefore released with urology follow-up  Patient re-presents with similar symptomatology reports of intractable worsening and progressive flank pain radiating to the abdomen; not accompanied by fever, chills but positive nausea without vomiting  Patient denies any dysuria or gross hematuria  CT of the abdomen and pelvis were repeated with findings of approximately 3 cm distal migration; however, right ureteral stone still causing significant hydronephrosis  Patient was admitted to the Internal Medicine service for aggressive IV fluids and antibiosis  Urologic consultation was requested for possible stent  surgical intervention  Active Problems:    Patient Active Problem List   Diagnosis    Hypertension, benign    Dizziness    Encounter for pregnancy test    Anxiety    Burn from the sun    Nephrolithiasis    Obstructive uropathy     Impressions  Right Ureteral Calculus  Right Hydronephrosis  Renal Colic    Recommendations  1  Initiate aggressive IVFs   Flomax  3  Analgesia/Narcotics 5  Anti-emetics 5  ATBs empirically while awaiting culture 6  Strain urine 7  NPO after Midnight for OR   Explained risk, benefits and potential complications of ureteroscopic stone extraction     There is a possibility patient may require staged ureteroscopic stone treatment if there is evidence of active infection or purulent urine at time of ureteral cannulation and stent only will be placed  Patient verbalized understanding; including rationale for medical decision making  with possible need for multi phase outpatient definitive stone treatment  Informed consent to be obtained by surgeon/proceduralist      Past Medical History:   Diagnosis Date    Anxiety     Gestational diabetes     Gestational diabetes     Hypertension        Past Surgical History:   Procedure Laterality Date    RENAL ARTERY STENT  2015       Family History   Problem Relation Age of Onset    Family history unknown: Yes       Social History     Social History    Marital status: Single     Spouse name: N/A    Number of children: N/A    Years of education: N/A     Occupational History    Not on file       Social History Main Topics    Smoking status: Current Every Day Smoker     Packs/day: 1 00     Types: Cigarettes    Smokeless tobacco: Never Used    Alcohol use Yes      Comment: occasional    Drug use: No    Sexual activity: Not on file     Other Topics Concern    Not on file     Social History Narrative    No narrative on file       Allergies   Allergen Reactions    Penicillins Hives       Review of Systems  10 point review of systems negative except as noted in HPI  Constitutional:   negative for - chills or fever  Cardiovascular:   no chest pain or dyspnea on exertion  Gastrointestinal:   positive for - abdominal pain and nausea/vomiting  Genito-Urinary:   no dysuria, trouble voiding, or hematuria  Neurological:   no TIA or stroke symptoms     Chart Review   Allergies, current medications, history, problem list    Vital Signs  /72 (BP Location: Left arm)   Pulse 57   Temp 97 6 °F (36 4 °C) (Oral)   Resp 16   Wt 120 kg (265 lb 9 6 oz)   LMP 06/01/2018   SpO2 97%   BMI 45 03 kg/m²     Physical Exam  General appearance: alert and oriented, in no acute distress  Head: Normocephalic, without obvious abnormality, atraumatic  Neck: no adenopathy, no carotid bruit, no JVD, supple, symmetrical, trachea midline and thyroid not enlarged, symmetric, no tenderness/mass/nodules  Lungs: clear to auscultation bilaterally  Heart: regular rate and rhythm, S1, S2 normal, no murmur, click, rub or gallop  Abdomen: soft, non-tender; bowel sounds normal; no masses,  no organomegaly  Extremities: extremities normal, warm and well-perfused; no cyanosis, clubbing, or edema  Pulses: 2+ and symmetric  Neurologic: Grossly normal     Laboratory Studies  Lab Results   Component Value Date    HGBA1C 6 0 (H) 08/11/2015     06/11/2018     08/15/2015    K 3 7 06/11/2018    K 3 8 08/15/2015     06/11/2018     (H) 08/15/2015    CO2 26 06/11/2018    CO2 21 08/15/2015    GLUCOSE 88 06/11/2018    GLUCOSE 72 08/15/2015    CREATININE 0 69 06/11/2018    CREATININE 0 50 (L) 08/15/2015    BUN 8 06/11/2018    BUN 8 08/15/2015    MG 1 8 01/15/2015    PHOS 2 8 01/15/2015     Lab Results   Component Value Date    WBC 7 65 06/11/2018    WBC 18 02 (H) 08/16/2015    RBC 5 03 06/11/2018    RBC 3 95 08/16/2015    HGB 13 0 06/11/2018    HGB 12 9 12/08/2015    HGB 10 7 (L) 08/16/2015    HCT 40 8 06/11/2018    HCT 32 7 (L) 08/16/2015    MCV 81 (L) 06/11/2018    MCV 83 08/16/2015    MCH 25 8 (L) 06/11/2018    MCH 27 1 08/16/2015    RDW 16 1 (H) 06/11/2018    RDW 15 3 (H) 08/16/2015     06/11/2018     08/16/2015         Imaging and Other Studies  )  Xr Abdomen 1 View Kub    Result Date: 6/12/2018  Narrative: ABDOMEN INDICATION:   right ureteral stone  COMPARISON:  CT on the same day VIEWS:  AP supine Images: 4 FINDINGS: Overlying the lower pole of the right kidney is a 3 mm renal calculus  On the right side of the pelvis is a 3 mm distal ureteral calculus Nonobstructive bowel gas pattern  No acute osseous abnormality is seen  Impression: Right renal calculus  Distal right ureteral calculus   Workstation performed: [id number]     Ct Renal Stone Study Abdomen Pelvis Without Contrast    Result Date: 6/11/2018  Narrative: CT ABDOMEN AND PELVIS WITHOUT IV CONTRAST - LOW DOSE RENAL STONE INDICATION:   right flank pain, UTI, recent stone  COMPARISON:  June 5, 2018 TECHNIQUE:  Low dose thin section CT examination of the abdomen and pelvis was performed without intravenous or oral contrast according to a protocol specifically designed to evaluate for urinary tract calculus  Axial, sagittal, and coronal 2D reformatted images were created from the source data and submitted for interpretation  Evaluation for pathology in the abdomen and pelvis that is unrelated to urinary tract calculi is limited  Radiation dose length product (DLP) for this visit:  769 mGy-cm   This examination, like all CT scans performed in the [facility], was performed utilizing techniques to minimize radiation dose exposure, including the use of iterative reconstruction and automated exposure control  FINDINGS: RIGHT KIDNEY AND URETER: Distal 5 mm obstructing ureteral calculus causing right hydroureteronephrosis, progressed distally by approximately 3 to 5 cm  This is not significantly changed from the prior exam   Other subcentimeter right renal calculi are also unchanged from the prior exam  LEFT KIDNEY AND URETER: Tiny 1 mm left lower pole renal nonobstructing calculus  No hydronephrosis  URINARY BLADDER: Unremarkable  No significant abnormality in the visualized lung bases  Visualized portion of the liver is diffusely decreased in density, consistent with hepatic steatosis  Limited low radiation dose noncontrast CT evaluation demonstrates no other clinically significant abnormality of liver, spleen, pancreas, or adrenal glands  No calcified gallstones or gallbladder wall thickening noted  No ascites or bulky lymphadenopathy on this limited noncontrast study  Bowel loops appear unremarkable   Limited evaluation demonstrates no evidence to suggest acute appendicitis  No acute fracture or destructive osseous lesion is identified  Impression: 1  Previously noted 5 mm obstructing right ureteral calculus has progressed distally by approximately 3-5 cm but remains and still causes right hydroureteronephrosis  2   Other nonobstructing unchanged right renal subcentimeter calculi  3   Hepatic steatosis  Workstation performed: [id number]     Ct Renal Stone Study Abdomen Pelvis Without Contrast    Result Date: 6/5/2018  Narrative: CT ABDOMEN AND PELVIS WITHOUT IV CONTRAST - LOW DOSE RENAL STONE INDICATION: 42-year-old woman with right-sided flank pain    Hematuria  COMPARISON:  Abdominal CT 11/9/2016 TECHNIQUE:  Low dose thin section CT examination of the abdomen and pelvis was performed without intravenous or oral contrast according to a protocol specifically designed to evaluate for urinary tract calculus  Axial, sagittal, and coronal 2D reformatted images were created from the source data and submitted for interpretation  Evaluation for pathology in the abdomen and pelvis that is unrelated to urinary tract calculi is limited  Radiation dose length product (DLP) for this visit:  630 mGy-cm   This examination, like all CT scans performed in the [facility], was performed utilizing techniques to minimize radiation dose exposure, including the use of iterative reconstruction and automated exposure control  FINDINGS: RIGHT KIDNEY AND URETER: 5 mm right mid ureteral calculus causing moderate upstream hydroureteronephrosis is new  Additional nonobstructing intrarenal calculi measuring up to 7 mm in the lower pole  LEFT KIDNEY AND URETER: Punctate 2 mm left kidney lower pole nonobstructing calculus  No hydronephrosis or hydroureter  URINARY BLADDER: Unremarkable  No significant abnormality in the visualized lung bases   Limited low radiation dose noncontrast CT evaluation demonstrates no clinically significant abnormality of spleen, pancreas, or adrenal glands  The dome of the liver is excluded  Severe hepatic steatosis  No calcified gallstones or gallbladder wall thickening noted  No ascites or bulky lymphadenopathy on this limited noncontrast study  Bowel loops appear unremarkable  The appendix is well seen and there is no evidence of acute appendicitis  No acute fracture or destructive osseous lesion is identified  Moderate-sized narrow necked umbilical hernia containing fat only is unchanged  Impression: 1  5 mm right mid ureteral calculus causes moderate upstream hydroureteronephrosis  Additional bilateral nonobstructing renal calculi as above  2  Severe hepatic steatosis  3  Moderate-sized narrow necked umbilical hernia containing fat only is unchanged  Workstation performed: [id number]       Medications   Scheduled Meds:  Current Facility-Administered Medications:  acetaminophen 650 mg Oral Q6H PRN Janette Liao PA-C    amLODIPine 5 mg Oral Daily JOSEPH Garcia-ROBBI    calamine-zinc oxide  Topical PRN Janette Liao, PA-ROBBI    cefazolin 2,000 mg Intravenous Q8H Janette Liao PA-C Last Rate: 2,000 mg (06/12/18 9195)   hydrOXYzine HCL 25 mg Oral Q6H PRN Janette Liao PA-C    ketorolac 15 mg Intravenous Q6H PRN Janette Liao, PA-ROBBI    nicotine 1 patch Transdermal Daily Janette Liao PA-C    ondansetron 4 mg Intravenous Q6H PRN Janette Liao, PA-ROBBI    oxyCODONE 5 mg Oral Q6H PRN Janette Liao, PA-ROBBI    sodium chloride 100 mL/hr Intravenous Continuous Yaritza Kapoor PA-C      Continuous Infusions:  sodium chloride 100 mL/hr     PRN Meds:   acetaminophen    calamine-zinc oxide    hydrOXYzine HCL    ketorolac    ondansetron    oxyCODONE      Total time spent with patient 30 minutes, >50% spent counseling and/or coordination of care           SYLVESTER Bond

## 2018-06-12 NOTE — ANESTHESIA POSTPROCEDURE EVALUATION
Post-Op Assessment Note      CV Status:  Stable    Mental Status:  Awake    Hydration Status:  Stable    PONV Controlled:  None    Airway Patency:  Patent    Post Op Vitals Reviewed: Yes          Staff: CRNA           BP (P) 133/81 (06/12/18 1647)    Temp (P) 98 °F (36 7 °C) (06/12/18 1647)    Pulse (!) (P) 107 (06/12/18 1647)   Resp (P) 17 (06/12/18 1647)    SpO2 91% on 6LFM   Postop VS in PACU noted above, SV non-obstructed

## 2018-06-12 NOTE — H&P
The patient was seen and examined  There are no changes from the prior outpatient history and physical examination  The patient is appropriately prepared for surgery today as planned  The patient has been appropriately marked    Informed consent was obtained      Plan:  Proceed to the operating room for cystoscopy and right ureteral stent placement

## 2018-06-12 NOTE — CASE MANAGEMENT
Initial Clinical Review    Admission: Date/Time/Statement: 6/11/2018 2308 OBSERVATION  AND CHANGED TO INPATIENT 6/12/2018  1143 re:  Meets inpatient criteria for - has hydronephrosis requiring iv analgesia 3 times last 24h    Start   Ordered   06/12/18 1143  Inpatient Admission Once     Transfer Service: General Medicine    Expected Discharge Date: 06/13/18       Question Answer Comment   Admitting Physician MYO MÉNDEZ    Level of Care Med Surg    Estimated length of stay More than 2 Midnights    Certification I certify that inpatient services are medically necessary for this patient for a duration of greater than two midnights  See H&P and MD Progress Notes for additional information about the patient's course of treatment  06/12/18 114           ED: Date/Time/Mode of Arrival:   ED Arrival Information     Expected Arrival Acuity Means of Arrival Escorted By Service Admission Type    - 6/11/2018 19:26 Urgent Walk-In Self General Medicine Urgent    Arrival Complaint    FLANK PAIN/SUNBURN          Chief Complaint:   Chief Complaint   Patient presents with    Flank Pain     Pt was dx with R sided kidney stone on tuesday of last week  Pt reports having worsening pain over R flank  Pt c/o R flank pain and decreased appetite  Denies N/V      Sunburn     Pt reports she was sent by her PCP for eval of the "first or second degree sun burn"  Pt has sun burn on chest, head and BL arms  History of Illness:  33 y o  female with PMHx of anxiety who presents with flank pain and suburn  She says her right side began hurting and she went to the ED last Tuesday at which point they found her stone and discharged her on pain meds  She said her pain was controlled the first two day  She says she was told the pain may increase in intensity if the stone was moving, however pt says the pain increased a couple days later  Her pain has not improved since    That is why she decided she could "get her kidneys checked" while she was here  She  states that the past couple days she has had increased urges to pee but then has no urine come out when she tries to urinate  does admit to some diarrhea  She says her diarrhea is resolving now  Does reports anorexia the past couple days as well  ED Vital Signs:   ED Triage Vitals [06/11/18 1932]   Temperature Pulse Respirations Blood Pressure SpO2   98 6 °F (37 °C) 82 16 143/81 97 %      Temp Source Heart Rate Source Patient Position - Orthostatic VS BP Location FiO2 (%)   Oral Monitor Sitting Left arm --      Pain Score       8        Wt Readings from Last 1 Encounters:   06/11/18 120 kg (265 lb 9 6 oz)       Vital Signs (abnormal): none    Abnormal Labs/Diagnostic Test Results:   Wbc 7 65, hgb 13, hct 40 8    Ct abdomen - Previously noted 5 mm obstructing right ureteral calculus has progressed distally by approximately 3-5 cm but remains and still causes right hydroureteronephrosis  2   Other nonobstructing unchanged right renal subcentimeter calculi  3   Hepatic steatosis  UA small leukocytes  + nitrites  2+ protein  Trace ketones  Small blood    ED Treatment:   Medication Administration from 06/11/2018 1926 to 06/12/2018 0042       Date/Time Order Dose Route Action     06/11/2018 2140 sodium chloride 0 9 % bolus 1,000 mL 1,000 mL Intravenous New Bag     06/11/2018 2142 tamsulosin (FLOMAX) capsule 0 4 mg 0 4 mg Oral Given     06/11/2018 2142 naproxen (NAPROSYN) tablet 500 mg 500 mg Oral Given     06/11/2018 2338 ceFAZolin (ANCEF) IVPB (premix) 2,000 mg 2,000 mg Intravenous New Bag     06/11/2018 2332 fentanyl citrate (PF) 100 MCG/2ML 50 mcg 50 mcg Intravenous Given     06/11/2018 2332 ketorolac (TORADOL) injection 30 mg 30 mg Intravenous Not Given          Past Medical/Surgical History:    Active Ambulatory Problems     Diagnosis Date Noted    Hypertension, benign 12/14/2017    Dizziness 12/28/2016    Encounter for pregnancy test 04/13/2018    Anxiety 06/11/2018    Burn from the sun 06/11/2018     Resolved Ambulatory Problems     Diagnosis Date Noted    No Resolved Ambulatory Problems     Past Medical History:   Diagnosis Date    Anxiety     Gestational diabetes     Gestational diabetes     Hypertension        Admitting Diagnosis: Sunburn [L55 9]  UTI (urinary tract infection) [N39 0]  Obstructive uropathy [N13 9]  Flank pain [R10 9]    Age/Sex: 33 y o  female    Assessment/Plan:   Nephrolithiasis   Assessment & Plan     1  Discharged from ED 6/5 with renal stone, returns with worsening pain                -CT renal: "previously noted 5 mm obstructing right ureteral calculus, progressed distally by approximately 3-5 cm"               -still with right hydroureteronephrosis  -UA with worsening UTI compared to 6/5, CBC wnl, afebrile                -BMP wnl  2  Started on Ancef in the ED, will continue   3  Ed attending spoke with Urology who will see in AM               -NPO after midnight   4  Will strain urine   5  Pain control        Hypertension, benign   Assessment & Plan     1  Controlled, continue amlodipine          Anxiety   Assessment & Plan     1  Continue Atarax and xanax prn         Admission Orders:  6/11/2018  2308 OBSERVATION  AND CHANGED TO INPATIENT 6/12/2018 1143  Scheduled Meds:   Current Facility-Administered Medications:  amLODIPine 5 mg Oral Daily    cefazolin 2,000 mg Intravenous Q8H Last Rate: 2,000 mg (06/12/18 0304)   nicotine 1 patch Transdermal Daily    sodium chloride 100 mL/hr Intravenous Continuous      Continuous Infusions:   sodium chloride 100 mL/hr     PRN Meds:    Ketorolac 15 mg iv - used x 1    Ondansetron  4 mg iv - used x 1    Strain all urine     Urine culture  NPO  Consult urology    Per urology - This patient has evidence of an active infection and will require stenting with a staged ureteroscopy        Thank you,  [address] [facility] in the [employer] for 2017  Network Utilization Review Department  Phone: [phone]; Fax [phone]  ATTENTION: The Network Utilization Review Department is now centralized for our 7 Facilities  Make a note that we have a new phone and fax numbers for our Department  Please call with any questions or concerns to [phone] and carefully follow the prompts so that you are directed to the right person  All voicemails are confidential  Fax any determinations, approvals, denials, and requests for initial or continue stay review clinical to [phone]  Due to HIGH CALL volume, it would be easier if you could please send faxed requests to expedite your requests and in part, help us provide discharge notifications faster

## 2018-06-12 NOTE — H&P
H&P- Kaye Perez 1985, 33 y o  female MRN: [id number]    Unit/Bed#: -01 Encounter: [id number]    Primary Care Provider: Aniceto Hooker MD   Date and time admitted to hospital: 6/11/2018  9:04 PM    * Nephrolithiasis   Assessment & Plan    1  Discharged from ED 6/5 with renal stone, returns with worsening pain    -CT renal: "previously noted 5 mm obstructing right ureteral calculus, progressed distally by approximately 3-5 cm"   -still with right hydroureteronephrosis  -UA with worsening UTI compared to 6/5, CBC wnl, afebrile    -BMP wnl  2  Started on Ancef in the ED, will continue   3  Ed attending spoke with Urology who will see in AM   -NPO after midnight   4  Will strain urine   5  Pain control         Hypertension, benign   Assessment & Plan    1  Controlled, continue amlodipine        Anxiety   Assessment & Plan    1  Continue Atarax and xanax prn          VTE Prophylaxis: can initate following possible OR procedure tomorrow  / sequential compression device   Code Status: FULL  POLST: POLST form is not discussed and not completed at this time  Discussion with family: none    Anticipated Length of Stay:  Patient will be admitted on an Observation basis with an anticipated length of stay of  < 2 midnights  Justification for Hospital Stay: per plan above    Total Time for Visit, including Counseling / Coordination of Care: 30 minutes  Greater than 50% of this total time spent on direct patient counseling and coordination of care  Chief Complaint:   Flank pain, sunburn     History of Present Illness:    Kaye Perez is a 33 y o  female with PMHx of anxiety who presents with flank pain and suburn  Pt states that she originally presented due to sunburn pain but "figured she would get her kidneys checked out" as well because her flank pain was not improving    She says her right side began hurting and she went to the ED last Tuesday at which point they found her stone and discharged her on pain meds  She said her pain was controlled the first two day  She says she was told the pain may increase in intensity if the stone was moving, however pt says the pain increased a couple days later and was not colicky in nature  Her pain has not improved since  That is why she decided she could "get her kidneys checked" while she was here  She denies any dysuria but states that the past couple days she has had increased urges to pee but then has no urine come out when she tries to urinate  She denies fever or chills  Denies nausea or vomiting but does admit to some diarrhea  She says her diarrhea is resolving now  Does reports anorexia the past couple days as well  Devonte HA or dizziness, denies abdominal pain  Review of Systems:    Review of Systems   Constitutional: Positive for appetite change  HENT: Negative  Eyes: Negative  Respiratory: Negative  Cardiovascular: Negative  Gastrointestinal: Positive for diarrhea  Negative for nausea and vomiting  Endocrine: Negative  Genitourinary: Positive for flank pain and urgency  Musculoskeletal: Positive for back pain  Skin: Positive for rash  Allergic/Immunologic: Negative  Neurological: Negative  Hematological: Negative  Psychiatric/Behavioral: Negative  Past Medical and Surgical History:     Past Medical History:   Diagnosis Date    Anxiety     Gestational diabetes     Gestational diabetes     Hypertension        Past Surgical History:   Procedure Laterality Date    RENAL ARTERY STENT  2015       Meds/Allergies:    Prior to Admission medications    Medication Sig Start Date End Date Taking?  Authorizing Provider   ALPRAZolam Andrew Sarmiento) 0 5 mg tablet Take 1 tablet (0 5 mg total) by mouth daily as needed for anxiety 6/11/18  Yes Oscar Parks MD   amLODIPine (NORVASC) 2 5 mg tablet Take 2 tablets by mouth daily 11/20/17  Yes Ale Glez MD   hydrOXYzine HCL (ATARAX) 25 mg tablet Take 1 tablet (25 mg total) by mouth every 6 (six) hours as needed for itching or anxiety for up to 20 doses 6/5/18  Yes Liza Newton MD   Methylprednisolone 4 MG TBPK Use as directed on package 6/11/18  Yes Artelia Nettle, DO   ondansetron (ZOFRAN ODT) 4 mg disintegrating tablet Take 2 tablets (8 mg total) by mouth every 8 (eight) hours as needed for nausea or vomiting for up to 10 doses 6/5/18  Yes Liza Newton MD   oxyCODONE (OXY-IR) 5 MG capsule Take 1 capsule (5 mg total) by mouth every 6 (six) hours as needed for severe pain for up to 15 doses Max Daily Amount: 20 mg 6/5/18  Yes Liza Newton MD     I have reviewed home medications with patient personally  Allergies: Allergies   Allergen Reactions    Penicillins Hives       Social History:     Marital Status: Single   Occupation: not discussed  Patient Pre-hospital Living Situation: not discussed  Patient Pre-hospital Level of Mobility: independent  Patient Pre-hospital Diet Restrictions: none  Substance Use History:   History   Alcohol Use    Yes     Comment: occasional     History   Smoking Status    Current Every Day Smoker    Packs/day: 1 00    Types: Cigarettes   Smokeless Tobacco    Never Used     History   Drug Use No       Family History:    non-contributory    Physical Exam:     Vitals:   Blood Pressure: 126/58 (06/11/18 2338)  Pulse: 70 (06/11/18 2338)  Temperature: 98 6 °F (37 °C) (06/11/18 1932)  Temp Source: Oral (06/11/18 1932)  Respirations: 16 (06/11/18 2338)  Weight - Scale: 120 kg (265 lb 9 6 oz) (06/11/18 1931)  SpO2: 96 % (06/11/18 2338)    Physical Exam   Constitutional: She appears well-developed and well-nourished  No distress  HENT:   Head: Normocephalic  Cardiovascular: Normal rate, regular rhythm, normal heart sounds and intact distal pulses  Exam reveals no gallop and no friction rub  No murmur heard  Pulmonary/Chest: Effort normal and breath sounds normal  No respiratory distress  She has no wheezes   She has no rales    Abdominal: Soft  Bowel sounds are normal  She exhibits no distension and no mass  There is tenderness (right sided)  There is no rebound and no guarding  Musculoskeletal: She exhibits no edema  Neurological: She is alert  Skin: Skin is warm and dry  She is not diaphoretic  There is erythema  Sunburn with bullae on the anterior chest, no open lesions   Psychiatric: She has a normal mood and affect  Her behavior is normal    Nursing note and vitals reviewed  Additional Data:     Lab Results: I have personally reviewed pertinent reports  Results from last 7 days  Lab Units 06/11/18  2140   WBC Thousand/uL 7 65   HEMOGLOBIN g/dL 13 0   HEMATOCRIT % 40 8   PLATELETS Thousands/uL 282   NEUTROS PCT % 68   LYMPHS PCT % 18   MONOS PCT % 6   EOS PCT % 7*       Results from last 7 days  Lab Units 06/11/18  2140   SODIUM mmol/L 142   POTASSIUM mmol/L 3 7   CHLORIDE mmol/L 107   CO2 mmol/L 26   BUN mg/dL 8   CREATININE mg/dL 0 69   CALCIUM mg/dL 8 7   TOTAL PROTEIN g/dL 7 1   BILIRUBIN TOTAL mg/dL 0 20   ALK PHOS U/L 107   ALT U/L 42   AST U/L 23   GLUCOSE RANDOM mg/dL 88                   Imaging: I have personally reviewed pertinent reports  CT renal stone study abdomen pelvis without contrast   Final Result by Mark Kraft MD (06/11 2235)         1  Previously noted 5 mm obstructing right ureteral calculus has progressed distally by approximately 3-5 cm but remains and still causes right hydroureteronephrosis  2   Other nonobstructing unchanged right renal subcentimeter calculi  3   Hepatic steatosis  Workstation performed: [id number]         XR abdomen 1 view kub    (Results Pending)       EKG, Pathology, and Other Studies Reviewed on Admission:   · EKG: none    Allscripts / Epic Records Reviewed: No     ** Please Note: This note has been constructed using a voice recognition system   **

## 2018-06-12 NOTE — OP NOTE
OPERATIVE REPORT  PATIENT NAME: Abhi Howe    :  1985  MRN: [id number]  Pt Location: AN OR ROOM 01    SURGERY DATE: 2018    Surgeon(s) and Role:     * Tim Matos MD - Primary    Preop Diagnosis:  Obstructive uropathy [N13 9]    Post-Op Diagnosis Codes:     * Obstructive uropathy [N13 9]    Procedure(s) (LRB):  CYSTOSCOPY URETEROSCOPY WITH LITHOTRIPSY HOLMIUM LASER, RETROGRADE PYELOGRAM AND INSERTION STENT URETERAL (Right)   Note:  Only a cystoscopy, right retrograde pyelography, and ureteral stent placement were done today    Specimen(s):  ID Type Source Tests Collected by Time Destination   A : post ureteral stent placement urine culture Urine Urine, Cystoscopic URINE CULTURE Tim Matos MD 2018 1631        Estimated Blood Loss:   Minimal    Drains:  Ureteral Drain/Stent Right ureter 6 Fr  (Active)   Number of days: 0       Anesthesia Type:   General    Operative Indications:  Obstructive uropathy [N13 9]  Urinary tract infection  Distal right ureteral stone    Operative Findings:  Orthotopic ureteral orifices, no stones seen within the bladder, uneventful right ureteral stent placement, mild-to-moderate hydronephrosis noted on retrograde pyelography    Complications:   None    Procedure and Technique:  Operative Note     PROCEDURES PERFORMED:  1) Cystoscopy  2) right retrograde pyelography with fluoroscopic interpretation  3) right ureteral stent placement (6F x 22cm     SURGEON:  Tim Matos MD    ASSISTANTS:  None    NOTE:  There were no qualified teaching residents to assist with this case    ANESTHESIA: General     COMPLICATIONS:   None    ANTIBIOTICS:  Ancef    INTRAOPERATIVE THROMBOEMBOLISM PROPHYLAXIS:  Pneumatic compression stockings     RADIOLOGIC FINDINGS:    1  Retrograde pyelogram was performed on the right side using a 5 Fr open ended catheter  17 milliliters of 100 % contrast was injected  2  The following findings were noted: Mild hydroureteronephrosis  Mildly dilated calyces  No filling defects  Contrast drained out of the collecting system  INDICATIONS FOR PROCEDURE:  Melvin Parks is an 33 y o  old female with a right hydronephrosis, a distal right ureteral stone, and a urinary tract infection  After discussing the options, the patient elected to undergo ureteral stent placement in preparation for future ureteroscopy with laser lithotripsy     We discussed the procedure in detail, the alternatives, and the risks, and they signed informed consent to proceed  PROCEDURE IN DETAIL:   The patient was identified and brought to the OR  Antibiotic prophylaxis and DVT prophylaxis were administered  They were placed in the comfortable dorsal lithotomy position with care to pad all pressure points  They were prepped and draped in the usual sterile fashion using hibiclens  A surgical time out was performed with all in the room in agreement with the correct patient, procedure, indications, and laterality  A 21-Andorran rigid cystoscope was used to enter the bladder  The bladder was inspected in its entirety and there were no lesions noted  The ureteral orifices were identified in their orthotopic positions  The right ureteral orifice was identified and a 5 Fr open ended catheter was placed into the ureteral orifice  The stone was not visible on  fluoroscopic guidance  A retrograde pyelogram was performed with injection of 100% contrast which demonstrated mild to moderate hydroureteronephrosis  A solo wire was up to the kidney under fluoroscopic guidance  A 6 And[place]ran by 22 centimeter right JJ stent was then passed up the wire  under fluoroscopic guidance into the right kidney with a good curl noted in the kidney and in the bladder  The bladder was drained  A post stent urine culture was obtained and sent        The patient was placed back in the supine position, awakened from general anesthesia and brought to the recovery room in stable condition  SPECIMENS:     Order Name Source Comment Collection Info Order Time   URINE CULTURE Urine, Cystoscopic  Collected By: Thom Marie MD 6/12/2018  4:31 PM        IMPLANTS:     Implant Name Type Inv  Item Serial No   Lot No  LRB No  Used   URETERAL STENT 6 FR X 22 CM OPTIMA INLAY - [id number]   URETERAL STENT 6 FR X 22 CM OPTIMA INLAY   [facility] DIVISION [id number] Right 1        COMPLICATIONS:  None    DISPOSITION: PACU     PLAN:  The patient will be discharged back to the primary service for treatment of her complicated urinary tract infection  After a appropriate period of convalescence she will require ureteroscopy and laser lithotripsy on the right and I will have my office arrange for this procedure in the future         I was present for the entire procedure and A qualified resident physician was not available    Patient Disposition:  PACU     SIGNATURE: Thom Marie MD  DATE: June 12, 2018  TIME: 4:37 PM

## 2018-06-12 NOTE — ASSESSMENT & PLAN NOTE
1   Discharged from ED 6/5 with renal stone, returns with worsening pain    -CT renal: "previously noted 5 mm obstructing right ureteral calculus, progressed distally by approximately 3-5 cm"   -still with right hydroureteronephrosis  -UA with worsening UTI compared to 6/5, CBC wnl, afebrile    -BMP wnl  2  Started on Ancef in the ED, will continue   3  Ed attending spoke with Urology who will see in AM   -NPO after midnight   4  Will strain urine   5   Pain control

## 2018-06-12 NOTE — DISCHARGE INSTRUCTIONS
Ureteral Stent Placement   WHAT YOU NEED TO KNOW:     Kaye,    Today you had a cystoscopy and a right ureteral stent placement  As discussed in the holding area you will require a ureteroscopy and laser lithotripsy in the future  I will reach out to my office to schedule this secondary procedure  It will be normal for you to have pain in your side will urinate, pain in your bladder, frequency of urination, and blood in your urine  If you have questions or concerns during the postoperative time span do not hesitate to contact my office  Take care and be well,  Dr Paras Jones          Ureteral stent placement is a procedure to open a blocked or narrow ureter  The ureter is the tube that carries urine from your kidney into your bladder  A stent is a thin hollow plastic tube used to hold your ureter open and allow urine to flow  The stent may stay in for several weeks  DISCHARGE INSTRUCTIONS:   Medicines:   · Pain medicine  may be given to take away or decrease pain  Do not wait until the pain is severe before you take your medicine  · Antibiotics  help prevent infections  Your healthcare provider may prescribe these for you while your stent remains in  · Take your medicine as directed  Contact your healthcare provider if you think your medicine is not helping or if you have side effects  Tell him or her if you are allergic to any medicine  Keep a list of the medicines, vitamins, and herbs you take  Include the amounts, and when and why you take them  Bring the list or the pill bottles to follow-up visits  Carry your medicine list with you in case of an emergency  Follow up with your urologist as directed: You will need regular follow-up visits with your urologist as long as the stent remains in  He will check to make sure the stent is working properly  He may do urine cultures to check for infection  Write down your questions so you remember to ask them during your visits    Self-care:   · Drink liquids  as directed  Ask your healthcare provider how much liquid to drink each day and which liquids are best for you  Fluids such as cranberry or apple juice may be especially helpful to prevent urinary infections  · Return to normal activities  the day after your stent placement or as directed by your healthcare provider  · You may take a shower  the day after your stent placement if your healthcare provider says it is okay  Contact your healthcare provider or urologist if:   · You have a fever or chills  · You feel like you need to urinate often  · You have pain when you urinate or pain around your bladder or kidney  · You see blood in your urine or it looks cloudy  · You have questions or concerns about your condition or care  Seek care immediately or call 911 if:   · You urinate little or not at all  · You have severe pain in your abdomen  © 2017 [employer]  Information is for End User's use only and may not be sold, redistributed or otherwise used for commercial purposes  All illustrations and images included in CareNotes® are the copyrighted property of [employer] , [employer]  or Francis Hernandez  The above information is an  only  It is not intended as medical advice for individual conditions or treatments  Talk to your doctor, nurse or pharmacist before following any medical regimen to see if it is safe and effective for you

## 2018-06-13 LAB — BACTERIA UR CULT: NORMAL

## 2018-06-13 PROCEDURE — 99232 SBSQ HOSP IP/OBS MODERATE 35: CPT | Performed by: NURSE PRACTITIONER

## 2018-06-13 PROCEDURE — 99232 SBSQ HOSP IP/OBS MODERATE 35: CPT | Performed by: INTERNAL MEDICINE

## 2018-06-13 RX ORDER — METOCLOPRAMIDE HYDROCHLORIDE 5 MG/ML
10 INJECTION INTRAMUSCULAR; INTRAVENOUS EVERY 6 HOURS PRN
Status: DISCONTINUED | OUTPATIENT
Start: 2018-06-13 | End: 2018-06-14 | Stop reason: HOSPADM

## 2018-06-13 RX ORDER — PHENAZOPYRIDINE HYDROCHLORIDE 100 MG/1
100 TABLET, FILM COATED ORAL
Status: DISCONTINUED | OUTPATIENT
Start: 2018-06-13 | End: 2018-06-14 | Stop reason: HOSPADM

## 2018-06-13 RX ORDER — OXYBUTYNIN CHLORIDE 5 MG/1
5 TABLET ORAL 3 TIMES DAILY
Status: DISCONTINUED | OUTPATIENT
Start: 2018-06-13 | End: 2018-06-14 | Stop reason: HOSPADM

## 2018-06-13 RX ORDER — KETOROLAC TROMETHAMINE 30 MG/ML
15 INJECTION, SOLUTION INTRAMUSCULAR; INTRAVENOUS EVERY 6 HOURS PRN
Status: DISCONTINUED | OUTPATIENT
Start: 2018-06-13 | End: 2018-06-14 | Stop reason: HOSPADM

## 2018-06-13 RX ADMIN — OXYBUTYNIN CHLORIDE 5 MG: 5 TABLET ORAL at 11:04

## 2018-06-13 RX ADMIN — PHENAZOPYRIDINE HYDROCHLORIDE 100 MG: 100 TABLET ORAL at 11:04

## 2018-06-13 RX ADMIN — KETOROLAC TROMETHAMINE 15 MG: 30 INJECTION, SOLUTION INTRAMUSCULAR at 20:12

## 2018-06-13 RX ADMIN — CEFAZOLIN SODIUM 2000 MG: 2 SOLUTION INTRAVENOUS at 07:34

## 2018-06-13 RX ADMIN — CEFAZOLIN SODIUM 2000 MG: 2 SOLUTION INTRAVENOUS at 15:02

## 2018-06-13 RX ADMIN — CEFAZOLIN SODIUM 2000 MG: 2 SOLUTION INTRAVENOUS at 00:11

## 2018-06-13 RX ADMIN — HYDROXYZINE HYDROCHLORIDE 25 MG: 25 TABLET, FILM COATED ORAL at 12:42

## 2018-06-13 RX ADMIN — HYDROMORPHONE HYDROCHLORIDE 0.5 MG: 1 INJECTION, SOLUTION INTRAMUSCULAR; INTRAVENOUS; SUBCUTANEOUS at 08:47

## 2018-06-13 RX ADMIN — CEFAZOLIN SODIUM 2000 MG: 2 SOLUTION INTRAVENOUS at 22:25

## 2018-06-13 RX ADMIN — KETOROLAC TROMETHAMINE 15 MG: 30 INJECTION, SOLUTION INTRAMUSCULAR at 14:06

## 2018-06-13 RX ADMIN — PHENAZOPYRIDINE HYDROCHLORIDE 100 MG: 100 TABLET ORAL at 16:24

## 2018-06-13 RX ADMIN — KETOROLAC TROMETHAMINE 15 MG: 30 INJECTION, SOLUTION INTRAMUSCULAR at 00:27

## 2018-06-13 RX ADMIN — OXYBUTYNIN CHLORIDE 5 MG: 5 TABLET ORAL at 16:24

## 2018-06-13 RX ADMIN — FERRIC OXIDE RED: 8; 8 LOTION TOPICAL at 14:06

## 2018-06-13 RX ADMIN — OXYCODONE HYDROCHLORIDE 5 MG: 5 TABLET ORAL at 18:36

## 2018-06-13 RX ADMIN — OXYCODONE HYDROCHLORIDE 5 MG: 5 TABLET ORAL at 04:57

## 2018-06-13 RX ADMIN — OXYBUTYNIN CHLORIDE 5 MG: 5 TABLET ORAL at 20:11

## 2018-06-13 RX ADMIN — FERRIC OXIDE RED: 8; 8 LOTION TOPICAL at 20:12

## 2018-06-13 RX ADMIN — HYDROXYZINE HYDROCHLORIDE 25 MG: 25 TABLET, FILM COATED ORAL at 20:12

## 2018-06-13 NOTE — PROGRESS NOTES
Progress Note - Kaye Phillips 33 y o  female MRN: [id number]    Unit/Bed#: -01 Encounter: [id number]      Assessment:    Ms Violeta Phillips is a pleasant 41-year-old  female known to our group for significant nephrolithiasis admitted by Internal Medicine  For chief complaint of right flank pain with CT confirmed right 5 mm obstructing ureteral calculus  Postoperative day 1 cystoscopy, retrograde pyelography and ureteral stent placement for renal decompression  Today patient is afebrile  Was mildly tachycardic but now resolved  Labs within normal limits  Cultures are pending  Patient is receiving IV Ancef  Plan:  Continue antibiosis and symptom management for complaints of stent colic  Encourage oral fluid hydration, bowel regimen  as constipation exacerbates stent colic  Ditropan TID for bladder spasms, narcotics for pain control with caution, and anti inflammatories  Narrow antibiosis per sensitivities  Patient will require full sterilization prior to  elective definitive stone treatment in ambulatory care setting  Patient has verbalized understanding of plan and is agreeable  No further  intervention indicated during this hospital stay  Please do not hesitate to contact our office with any questions or new  concerns  Subjective:     "I feel okay  "    Objective:     Vitals: Blood pressure 128/61, pulse 73, temperature 98 2 °F (36 8 °C), temperature source Oral, resp  rate 16, weight 120 kg (265 lb 9 6 oz), last menstrual period 06/01/2018, SpO2 99 %, unknown if currently breastfeeding  ,Body mass index is 45 03 kg/m²        Intake/Output Summary (Last 24 hours) at 06/13/18 0911  Last data filed at 06/13/18 0211   Gross per 24 hour   Intake              820 ml   Output              600 ml   Net              220 ml       Physical Exam: General appearance: alert and oriented, in no acute distress, appears stated age and cooperative  Head: Normocephalic, without obvious abnormality, atraumatic  Neck: no adenopathy, no carotid bruit, no JVD, supple, symmetrical, trachea midline and thyroid not enlarged, symmetric, no tenderness/mass/nodules  Lungs: clear to auscultation bilaterally  Heart: regular rate and rhythm, S1, S2 normal, no murmur, click, rub or gallop  Abdomen: soft, non-tender; bowel sounds normal; no masses,  no organomegaly  Extremities: extremities normal, warm and well-perfused; no cyanosis, clubbing, or edema  Pulses: 2+ and symmetric  Neurologic: Grossly normal     Invasive Devices     Peripheral Intravenous Line            Peripheral IV 06/11/18 Left Antecubital 1 day          Drain            Ureteral Drain/Stent Right ureter 6 Fr  less than 1 day              Lab Results   Component Value Date    WBC 7 65 06/11/2018    HGB 13 0 06/11/2018    HCT 40 8 06/11/2018    MCV 81 (L) 06/11/2018     06/11/2018     Lab Results   Component Value Date    GLUCOSE 88 06/11/2018    CALCIUM 8 7 06/11/2018     06/11/2018    K 3 7 06/11/2018    CO2 26 06/11/2018     06/11/2018    BUN 8 06/11/2018    CREATININE 0 69 06/11/2018         Lab, Imaging and other studies: I have personally reviewed pertinent reports

## 2018-06-13 NOTE — PROGRESS NOTES
[facility] Progress Note  Patient: Brandt Quintainlla 33 y o  female   MRN: [id number]  PCP: Stan Barnes MD  Unit/Bed#: -01 Encounter: [id number]  Date Of Visit: 18    Assessment:    Principal Problem:    Nephrolithiasis  Active Problems:    Hypertension, benign    Anxiety    Obstructive uropathy      Plan:    · Right ureter calculus with hydronephrosis:  · Status post ureteral stent placement  · Urology follow-up  · Pain controlled hydration  · Hypertension  · Continue outpatient regimen  · Anxiety  · Xanax       VTE Pharmacologic Prophylaxis:   Pharmacologic: Low risk ambulate  Mechanical VTE Prophylaxis in Place: Yes    Patient Centered Rounds: I have performed bedside rounds with nursing staff today  Discussions with Specialists or Other Care Team Provider:  Nursing    Education and Discussions with Family / Patient:  Patient    Time Spent for Care: 30 minutes  More than 50% of total time spent on counseling and coordination of care as described above  Current Length of Stay: 1 day(s)    Current Patient Status: Inpatient   Certification Statement: The patient will continue to require additional inpatient hospital stay due to Pain control    Discharge Plan / Estimated Discharge Date:  Pending pain control    Code Status: Level 1 - Full Code      Subjective:   Still complaining of lot of pain in the right flank  Felt nauseous earlier and vomited once    Objective:     Vitals:   Temp (24hrs), Av 2 °F (36 8 °C), Min:97 5 °F (36 4 °C), Max:99 3 °F (37 4 °C)    HR:  [] 59  Resp:  [16-24] 16  BP: (106-153)/(54-82) 122/56  SpO2:  [87 %-99 %] 97 %  Body mass index is 45 03 kg/m²  Input and Output Summary (last 24 hours):        Intake/Output Summary (Last 24 hours) at 18 1530  Last data filed at 18 1404   Gross per 24 hour   Intake             1060 ml   Output              600 ml   Net              460 ml       Physical Exam:     Physical Exam Gen -Patient comfortable   Neck- Supple  No thyromegaly or lymphadenopathy  Lungs-Clear bilaterally without any wheeze or rales   Heart S1-S2, regular rate and rhythm, no murmurs  Abdomen-soft nontender, no organomegaly  Bowel sounds present  Extremities-no cyanosi,  clubbing or edema  Skin- no rash  Neuro-nonfocal         Additional Data:     Labs:      Results from last 7 days  Lab Units 06/11/18  2140   WBC Thousand/uL 7 65   HEMOGLOBIN g/dL 13 0   HEMATOCRIT % 40 8   PLATELETS Thousands/uL 282   NEUTROS PCT % 68   LYMPHS PCT % 18   MONOS PCT % 6   EOS PCT % 7*       Results from last 7 days  Lab Units 06/11/18  2140   SODIUM mmol/L 142   POTASSIUM mmol/L 3 7   CHLORIDE mmol/L 107   CO2 mmol/L 26   BUN mg/dL 8   CREATININE mg/dL 0 69   CALCIUM mg/dL 8 7   TOTAL PROTEIN g/dL 7 1   BILIRUBIN TOTAL mg/dL 0 20   ALK PHOS U/L 107   ALT U/L 42   AST U/L 23   GLUCOSE RANDOM mg/dL 88           * I Have Reviewed All Lab Data Listed Above  * Additional Pertinent Lab Tests Reviewed:  All Labs Within Last 24 Hours Reviewed    Imaging:    Imaging Reports Reviewed Today Include:   Imaging Personally Reviewed by Myself Includes:      Recent Cultures (last 7 days):       Results from last 7 days  Lab Units 06/12/18  1631 06/11/18  2141   URINE CULTURE  No Growth <1000 cfu/mL 50,000-59,000 cfu/ml        Last 24 Hours Medication List:     Current Facility-Administered Medications:  acetaminophen 650 mg Oral Q6H PRN Viridiana Zamudio, PA-C    amLODIPine 5 mg Oral Daily Viridiana Zamudio, PA-ROBBI    calamine-zinc oxide  Topical PRN Viridiana Zamudio, PA-ROBBI    cefazolin 2,000 mg Intravenous Q8H Viridiana Zamudio, PA-ROBBI Last Rate: 2,000 mg (06/13/18 1502)   HYDROmorphone 0 5 mg Intravenous Q4H PRN Cherrie Lima MD    hydrOXYzine HCL 25 mg Oral Q6H PRN Viridiana Zamudio PA-C    ketorolac 15 mg Intravenous Q6H PRN Cherrie Lima MD    metoclopramide 10 mg Intravenous Q6H PRN Cherrie Lima MD    nicotine 1 patch Transdermal Daily [employer], PA-C    oxybutynin 5 mg Oral TID SYLVESTER Mosqueda    oxyCODONE 5 mg Oral Q6H PRN [employer], PA-ROBBI    phenazopyridine 100 mg Oral TID With Meals SYLVESTER Mosqueda         Today, Patient Was Seen By: Leslee Workman MD    ** Please Note: This note has been constructed using a voice recognition system   **

## 2018-06-14 ENCOUNTER — TRANSITIONAL CARE MANAGEMENT (OUTPATIENT)
Dept: FAMILY MEDICINE CLINIC | Facility: CLINIC | Age: 33
End: 2018-06-14

## 2018-06-14 VITALS
OXYGEN SATURATION: 97 % | TEMPERATURE: 97.9 F | RESPIRATION RATE: 16 BRPM | HEART RATE: 61 BPM | BODY MASS INDEX: 45.03 KG/M2 | SYSTOLIC BLOOD PRESSURE: 110 MMHG | WEIGHT: 265.6 LBS | DIASTOLIC BLOOD PRESSURE: 61 MMHG

## 2018-06-14 LAB — BACTERIA UR CULT: NORMAL

## 2018-06-14 PROCEDURE — 99239 HOSP IP/OBS DSCHRG MGMT >30: CPT | Performed by: INTERNAL MEDICINE

## 2018-06-14 RX ADMIN — PHENAZOPYRIDINE HYDROCHLORIDE 100 MG: 100 TABLET ORAL at 12:26

## 2018-06-14 RX ADMIN — KETOROLAC TROMETHAMINE 15 MG: 30 INJECTION, SOLUTION INTRAMUSCULAR at 02:45

## 2018-06-14 RX ADMIN — PHENAZOPYRIDINE HYDROCHLORIDE 100 MG: 100 TABLET ORAL at 08:47

## 2018-06-14 RX ADMIN — HYDROXYZINE HYDROCHLORIDE 25 MG: 25 TABLET, FILM COATED ORAL at 08:54

## 2018-06-14 RX ADMIN — KETOROLAC TROMETHAMINE 15 MG: 30 INJECTION, SOLUTION INTRAMUSCULAR at 08:54

## 2018-06-14 RX ADMIN — OXYBUTYNIN CHLORIDE 5 MG: 5 TABLET ORAL at 08:48

## 2018-06-14 RX ADMIN — CEFAZOLIN SODIUM 2000 MG: 2 SOLUTION INTRAVENOUS at 06:39

## 2018-06-14 NOTE — DISCHARGE SUMMARY
Discharge Summary - [facility] Internal Medicine    Patient Information: Jovan Kimbrough 33 y o  female MRN: [id number]  Unit/Bed#: -01 Encounter: [id number]    Discharging Physician / Practitioner: Sam Hammond MD  PCP: Yonathan Sam MD  Admission Date: 6/11/2018  Discharge Date: 06/14/18    Disposition:     Home    Reason for Admission:  Flank pain    Discharge Diagnoses:     Principal Problem:    Nephrolithiasis  Active Problems:    Hypertension, benign    Anxiety    Obstructive uropathy  Resolved Problems:    * No resolved hospital problems  *      Consultations During Hospital Stay:  · Urology    Procedures Performed:     · Cystoscopy and stent placement    CT renal stone study: 1  Previously noted 5 mm obstructing right ureteral calculus has progressed distally by approximately 3-5 cm but remains and still causes right hydroureteronephrosis  2   Other nonobstructing unchanged right renal subcentimeter calculi  · 3  Hepatic steatosis  Incidental Findings:   · As above    Hospital Course:     Jovan Kimbrough is a 33 y o  female patient who originally presented to the hospital on 6/11/2018 due to     Condition at Discharge: good     Discharge Day Visit / Exam:     Subjective:  Feeling better today  Wishes to go home  Vitals: Blood Pressure: 110/61 (06/14/18 0748)  Pulse: 61 (06/14/18 0748)  Temperature: 97 9 °F (36 6 °C) (06/14/18 0748)  Temp Source: Oral (06/14/18 0748)  Respirations: 16 (06/14/18 0748)  Weight - Scale: 120 kg (265 lb 9 6 oz) (06/11/18 1931)  SpO2: 97 % (06/14/18 0748)  Exam:   Physical Exam     Gen -Patient comfortable   Neck- Supple  No thyromegaly or lymphadenopathy  Lungs-Clear bilaterally without any wheeze or rales   Heart S1-S2, regular rate and rhythm, no murmurs  Abdomen-soft nontender, no organomegaly   Bowel sounds present  Extremities-no cyanosi,  clubbing or edema  Skin- no rash  Neuro-non focal    Discussion with Family: no    Discharge instructions/Information to patient and family:   See after visit summary for information provided to patient and family  Provisions for Follow-Up Care:  See after visit summary for information related to follow-up care and any pertinent home health orders  Planned Readmission: no     Discharge Statement:  I spent 35 minutes discharging the patient  This time was spent on the day of discharge  I had direct contact with the patient on the day of discharge  Greater than 50% of the total time was spent examining patient, answering all patient questions, arranging and discussing plan of care with patient as well as directly providing post-discharge instructions  Additional time then spent on discharge activities  Discharge Medications:  See after visit summary for reconciled discharge medications provided to patient and family        ** Please Note: This note has been constructed using a voice recognition system **

## 2018-06-14 NOTE — CASE MANAGEMENT
Notification of Discharge  This is a Notification of Discharge from our facility [address]  Please be advised that this patient has been discharge from our facility  Below you will find the admission and discharge date and time including the patients disposition  PRESENTATION DATE: 6/11/2018  9:04 PM  IP ADMISSION DATE: 6/12/18 1143  DISCHARGE DATE: 6/14/2018  1:05 PM  DISPOSITION: [address] [place] [address] in the [facility] by Francis Hernandez for 2017  Network Utilization Review Department  Phone: [phone]; Fax [phone]  ATTENTION: The Network Utilization Review Department is now centralized for our 7 Facilities  Make a note that we have a new phone and fax numbers for our Department  Please call with any questions or concerns to [phone] and carefully follow the prompts so that you are directed to the right person  All voicemails are confidential  Fax any determinations, approvals, denials, and requests for initial or continue stay review clinical to [phone]  Due to HIGH CALL volume, it would be easier if you could please send faxed requests to expedite your requests and in part, help us provide discharge notifications faster        Reference #[id number]

## 2018-06-15 ENCOUNTER — TRANSITIONAL CARE MANAGEMENT (OUTPATIENT)
Dept: FAMILY MEDICINE CLINIC | Facility: CLINIC | Age: 33
End: 2018-06-15

## 2018-06-18 ENCOUNTER — APPOINTMENT (EMERGENCY)
Dept: CT IMAGING | Facility: HOSPITAL | Age: 33
End: 2018-06-18
Payer: COMMERCIAL

## 2018-06-18 ENCOUNTER — HOSPITAL ENCOUNTER (EMERGENCY)
Facility: HOSPITAL | Age: 33
Discharge: HOME | End: 2018-06-18
Attending: EMERGENCY MEDICINE | Admitting: EMERGENCY MEDICINE
Payer: COMMERCIAL

## 2018-06-18 VITALS
RESPIRATION RATE: 18 BRPM | BODY MASS INDEX: 46.01 KG/M2 | HEART RATE: 60 BPM | DIASTOLIC BLOOD PRESSURE: 60 MMHG | TEMPERATURE: 98.3 F | OXYGEN SATURATION: 98 % | WEIGHT: 271.39 LBS | SYSTOLIC BLOOD PRESSURE: 124 MMHG

## 2018-06-18 DIAGNOSIS — N23 RENAL COLIC ON RIGHT SIDE: Primary | ICD-10-CM

## 2018-06-18 DIAGNOSIS — T78.40XA ALLERGIC REACTION, INITIAL ENCOUNTER: ICD-10-CM

## 2018-06-18 DIAGNOSIS — N39.0 UTI (URINARY TRACT INFECTION): ICD-10-CM

## 2018-06-18 LAB
ALBUMIN SERPL BCP-MCNC: 3.3 G/DL (ref 3.5–5)
ALP SERPL-CCNC: 96 U/L (ref 46–116)
ALT SERPL W P-5'-P-CCNC: 50 U/L (ref 12–78)
ANION GAP SERPL CALCULATED.3IONS-SCNC: 9 MMOL/L (ref 4–13)
AST SERPL W P-5'-P-CCNC: 40 U/L (ref 5–45)
BACTERIA UR QL AUTO: ABNORMAL /HPF
BASOPHILS # BLD AUTO: 0.03 THOUSANDS/ΜL (ref 0–0.1)
BASOPHILS NFR BLD AUTO: 1 % (ref 0–1)
BILIRUB SERPL-MCNC: 0.3 MG/DL (ref 0.2–1)
BILIRUB UR QL STRIP: NEGATIVE
BUN SERPL-MCNC: 12 MG/DL (ref 5–25)
CALCIUM SERPL-MCNC: 9 MG/DL (ref 8.3–10.1)
CAOX CRY URNS QL MICRO: ABNORMAL /HPF
CHLORIDE SERPL-SCNC: 105 MMOL/L (ref 100–108)
CK SERPL-CCNC: 91 U/L (ref 26–192)
CLARITY UR: ABNORMAL
CO2 SERPL-SCNC: 25 MMOL/L (ref 21–32)
COLOR UR: YELLOW
CREAT SERPL-MCNC: 0.73 MG/DL (ref 0.6–1.3)
EOSINOPHIL # BLD AUTO: 0.43 THOUSAND/ΜL (ref 0–0.61)
EOSINOPHIL NFR BLD AUTO: 7 % (ref 0–6)
ERYTHROCYTE [DISTWIDTH] IN BLOOD BY AUTOMATED COUNT: 15.8 % (ref 11.6–15.1)
EXT PREG TEST URINE: NORMAL
GFR SERPL CREATININE-BSD FRML MDRD: 110 ML/MIN/1.73SQ M
GLUCOSE SERPL-MCNC: 94 MG/DL (ref 65–140)
GLUCOSE UR STRIP-MCNC: NEGATIVE MG/DL
HCT VFR BLD AUTO: 39.6 % (ref 34.8–46.1)
HGB BLD-MCNC: 12.5 G/DL (ref 11.5–15.4)
HGB UR QL STRIP.AUTO: ABNORMAL
KETONES UR STRIP-MCNC: NEGATIVE MG/DL
LEUKOCYTE ESTERASE UR QL STRIP: ABNORMAL
LIPASE SERPL-CCNC: 79 U/L (ref 73–393)
LYMPHOCYTES # BLD AUTO: 1.32 THOUSANDS/ΜL (ref 0.6–4.47)
LYMPHOCYTES NFR BLD AUTO: 21 % (ref 14–44)
MCH RBC QN AUTO: 26 PG (ref 26.8–34.3)
MCHC RBC AUTO-ENTMCNC: 31.6 G/DL (ref 31.4–37.4)
MCV RBC AUTO: 83 FL (ref 82–98)
MONOCYTES # BLD AUTO: 0.37 THOUSAND/ΜL (ref 0.17–1.22)
MONOCYTES NFR BLD AUTO: 6 % (ref 4–12)
MUCOUS THREADS UR QL AUTO: ABNORMAL
NEUTROPHILS # BLD AUTO: 4.03 THOUSANDS/ΜL (ref 1.85–7.62)
NEUTS SEG NFR BLD AUTO: 65 % (ref 43–75)
NITRITE UR QL STRIP: NEGATIVE
NON-SQ EPI CELLS URNS QL MICRO: ABNORMAL /HPF
PH UR STRIP.AUTO: 6 [PH] (ref 4.5–8)
PLATELET # BLD AUTO: 271 THOUSANDS/UL (ref 149–390)
PMV BLD AUTO: 10.5 FL (ref 8.9–12.7)
POTASSIUM SERPL-SCNC: 4 MMOL/L (ref 3.5–5.3)
PROT SERPL-MCNC: 6.9 G/DL (ref 6.4–8.2)
PROT UR STRIP-MCNC: ABNORMAL MG/DL
RBC # BLD AUTO: 4.8 MILLION/UL (ref 3.81–5.12)
RBC #/AREA URNS AUTO: ABNORMAL /HPF
SODIUM SERPL-SCNC: 139 MMOL/L (ref 136–145)
SP GR UR STRIP.AUTO: 1.02 (ref 1–1.03)
UROBILINOGEN UR QL STRIP.AUTO: 0.2 E.U./DL
WBC # BLD AUTO: 6.18 THOUSAND/UL (ref 4.31–10.16)
WBC #/AREA URNS AUTO: ABNORMAL /HPF

## 2018-06-18 PROCEDURE — 80053 COMPREHEN METABOLIC PANEL: CPT | Performed by: EMERGENCY MEDICINE

## 2018-06-18 PROCEDURE — 96374 THER/PROPH/DIAG INJ IV PUSH: CPT

## 2018-06-18 PROCEDURE — 96361 HYDRATE IV INFUSION ADD-ON: CPT

## 2018-06-18 PROCEDURE — 81001 URINALYSIS AUTO W/SCOPE: CPT

## 2018-06-18 PROCEDURE — 85025 COMPLETE CBC W/AUTO DIFF WBC: CPT | Performed by: EMERGENCY MEDICINE

## 2018-06-18 PROCEDURE — 81025 URINE PREGNANCY TEST: CPT | Performed by: EMERGENCY MEDICINE

## 2018-06-18 PROCEDURE — 87086 URINE CULTURE/COLONY COUNT: CPT | Performed by: EMERGENCY MEDICINE

## 2018-06-18 PROCEDURE — 99284 EMERGENCY DEPT VISIT MOD MDM: CPT

## 2018-06-18 PROCEDURE — 36415 COLL VENOUS BLD VENIPUNCTURE: CPT | Performed by: EMERGENCY MEDICINE

## 2018-06-18 PROCEDURE — 74176 CT ABD & PELVIS W/O CONTRAST: CPT

## 2018-06-18 PROCEDURE — 96375 TX/PRO/DX INJ NEW DRUG ADDON: CPT

## 2018-06-18 PROCEDURE — 83690 ASSAY OF LIPASE: CPT | Performed by: EMERGENCY MEDICINE

## 2018-06-18 PROCEDURE — 82550 ASSAY OF CK (CPK): CPT | Performed by: EMERGENCY MEDICINE

## 2018-06-18 RX ORDER — METHYLPREDNISOLONE SODIUM SUCCINATE 125 MG/2ML
125 INJECTION, POWDER, LYOPHILIZED, FOR SOLUTION INTRAMUSCULAR; INTRAVENOUS ONCE
Status: COMPLETED | OUTPATIENT
Start: 2018-06-18 | End: 2018-06-18

## 2018-06-18 RX ORDER — KETOROLAC TROMETHAMINE 30 MG/ML
30 INJECTION, SOLUTION INTRAMUSCULAR; INTRAVENOUS ONCE
Status: COMPLETED | OUTPATIENT
Start: 2018-06-18 | End: 2018-06-18

## 2018-06-18 RX ORDER — NITROFURANTOIN 25; 75 MG/1; MG/1
100 CAPSULE ORAL ONCE
Status: COMPLETED | OUTPATIENT
Start: 2018-06-18 | End: 2018-06-18

## 2018-06-18 RX ORDER — FAMOTIDINE 20 MG/1
20 TABLET, FILM COATED ORAL 2 TIMES DAILY
Qty: 10 TABLET | Refills: 0 | Status: SHIPPED | OUTPATIENT
Start: 2018-06-18 | End: 2018-07-03 | Stop reason: ALTCHOICE

## 2018-06-18 RX ORDER — DIPHENHYDRAMINE HYDROCHLORIDE 50 MG/ML
50 INJECTION INTRAMUSCULAR; INTRAVENOUS ONCE
Status: COMPLETED | OUTPATIENT
Start: 2018-06-18 | End: 2018-06-18

## 2018-06-18 RX ORDER — DIPHENHYDRAMINE HCL 50 MG
50 CAPSULE ORAL EVERY 6 HOURS PRN
Qty: 25 CAPSULE | Refills: 0 | Status: SHIPPED | OUTPATIENT
Start: 2018-06-18 | End: 2018-07-03 | Stop reason: SDDI

## 2018-06-18 RX ORDER — OXYCODONE AND ACETAMINOPHEN 10; 325 MG/1; MG/1
1 TABLET ORAL EVERY 4 HOURS PRN
Qty: 20 TABLET | Refills: 0 | Status: SHIPPED | OUTPATIENT
Start: 2018-06-18 | End: 2018-06-23

## 2018-06-18 RX ORDER — PREDNISONE 20 MG/1
20 TABLET ORAL DAILY
Qty: 15 TABLET | Refills: 0 | Status: SHIPPED | OUTPATIENT
Start: 2018-06-18 | End: 2018-06-23

## 2018-06-18 RX ORDER — NITROFURANTOIN 25; 75 MG/1; MG/1
100 CAPSULE ORAL 2 TIMES DAILY
Qty: 14 CAPSULE | Refills: 0 | Status: SHIPPED | OUTPATIENT
Start: 2018-06-18 | End: 2018-06-25 | Stop reason: ALTCHOICE

## 2018-06-18 RX ADMIN — FAMOTIDINE 20 MG: 10 INJECTION, SOLUTION INTRAVENOUS at 07:10

## 2018-06-18 RX ADMIN — KETOROLAC TROMETHAMINE 30 MG: 30 INJECTION, SOLUTION INTRAMUSCULAR at 07:13

## 2018-06-18 RX ADMIN — METHYLPREDNISOLONE SODIUM SUCCINATE 125 MG: 125 INJECTION, POWDER, FOR SOLUTION INTRAMUSCULAR; INTRAVENOUS at 07:16

## 2018-06-18 RX ADMIN — NITROFURANTOIN (MONOHYDRATE/MACROCRYSTALS) 100 MG: 75; 25 CAPSULE ORAL at 08:42

## 2018-06-18 RX ADMIN — DIPHENHYDRAMINE HYDROCHLORIDE 50 MG: 50 INJECTION, SOLUTION INTRAMUSCULAR; INTRAVENOUS at 07:08

## 2018-06-18 RX ADMIN — SODIUM CHLORIDE 1000 ML: 0.9 INJECTION, SOLUTION INTRAVENOUS at 07:07

## 2018-06-18 NOTE — ED PROVIDER NOTES
History  Chief Complaint   Patient presents with    Flank Pain     Pt presents to ED for evaluation and treatment of right=sided back/flank pain  S/P urinary stent placement 5 days ago  Pt also c/o generalized itching/redness     Patient is a 33year old female with worsening R flank pain with dysuria since yesterday  Has had worsening rash and itching for past several days as well  States rash and itching started before patient took a few percocets  States she is not currently on abx but had IV abx  Has been taking atarax without relief  New recent medications are ditropan and flomax  No fever  No N/V  Was last seen in this ED on 6/11/18 for nephrolithiasis  [facility] website checked on this patient and last Rx filled was on 6/14/18 for percocet for 3 day supply  Had lithotripsy and ureteral stenting on 6/12/18  History provided by:  Patient   used: No        Prior to Admission Medications   Prescriptions Last Dose Informant Patient Reported? Taking?    ALPRAZolam (XANAX) 0 5 mg tablet   No No   Sig: Take 1 tablet (0 5 mg total) by mouth daily as needed for anxiety   Methylprednisolone 4 MG TBPK   No No   Sig: Use as directed on package   amLODIPine (NORVASC) 2 5 mg tablet  Self No No   Sig: Take 2 tablets by mouth daily   docusate sodium (COLACE) 100 mg capsule   No No   Sig: Take 1 capsule (100 mg total) by mouth 3 (three) times a day for 30 days   hydrOXYzine HCL (ATARAX) 25 mg tablet   No No   Sig: Take 1 tablet (25 mg total) by mouth every 6 (six) hours as needed for itching or anxiety for up to 20 doses   ondansetron (ZOFRAN ODT) 4 mg disintegrating tablet   No No   Sig: Take 2 tablets (8 mg total) by mouth every 8 (eight) hours as needed for nausea or vomiting for up to 10 doses   oxyCODONE (OXY-IR) 5 MG capsule   No No   Sig: Take 1 capsule (5 mg total) by mouth every 6 (six) hours as needed for severe pain for up to 15 doses Max Daily Amount: 20 mg   oxyCODONE-acetaminophen (PERCOCET) 5-325 mg per tablet   No No   Sig: Take 1 tablet by mouth every 4 (four) hours as needed for moderate pain for up to 5 days Max Daily Amount: 6 tablets   oxybutynin (DITROPAN) 5 mg tablet   No No   Sig: Take 1 tablet (5 mg total) by mouth 2 (two) times a day as needed (stent colic and pain) for up to 30 days   tamsulosin (FLOMAX) 0 4 mg   No No   Sig: Take 1 capsule (0 4 mg total) by mouth daily with dinner      Facility-Administered Medications: None       Past Medical History:   Diagnosis Date    Anxiety     Gestational diabetes     Gestational diabetes     Hypertension     Obesity        Past Surgical History:   Procedure Laterality Date    GA CYSTO/URETERO W/LITHOTRIPSY &INDWELL STENT INSRT Right 6/12/2018    Procedure: CYSTOSCOPY URETEROSCOPY WITH LITHOTRIPSY HOLMIUM LASER, RETROGRADE PYELOGRAM AND INSERTION STENT URETERAL;  Surgeon: Genoveva Morris MD;  Location: [facility];  Service: Urology    RENAL ARTERY STENT  2015       Family History   Problem Relation Age of Onset    Diabetes Mother     Hypertension Mother     Muscular dystrophy Family      I have reviewed and agree with the history as documented  Social History   Substance Use Topics    Smoking status: Current Every Day Smoker     Packs/day: 1 00     Types: Cigarettes    Smokeless tobacco: Never Used    Alcohol use Yes      Comment: occasional        Review of Systems   Constitutional: Negative for fever  Respiratory: Negative for shortness of breath  Gastrointestinal: Negative for nausea and vomiting  Genitourinary: Positive for dysuria and flank pain  Skin: Positive for rash (with itching)  All other systems reviewed and are negative  Physical Exam  Physical Exam   Constitutional: She is oriented to person, place, and time  She appears well-developed and well-nourished  She appears distressed (moderate)  HENT:   Head: Normocephalic and atraumatic     Mucous membranes somewhat moist     Eyes: No scleral icterus  Neck: No tracheal deviation present  Cardiovascular: Normal rate, regular rhythm and normal heart sounds  No murmur heard  Pulmonary/Chest: Effort normal and breath sounds normal  No stridor  No respiratory distress  Abdominal: Soft  Bowel sounds are normal  There is no tenderness  R CVAT  Musculoskeletal: She exhibits edema (bilateral LE edema)  She exhibits no deformity  Neurological: She is alert and oriented to person, place, and time  Skin: Skin is warm and dry  Rash (diffuse erythematous macular rash over entire body with multiple excoriations but no evidence of cellulitis  ) noted  Psychiatric: She has a normal mood and affect  Nursing note and vitals reviewed  Vital Signs  ED Triage Vitals [06/18/18 0630]   Temperature Pulse Respirations Blood Pressure SpO2   98 3 °F (36 8 °C) 83 18 142/88 98 %      Temp Source Heart Rate Source Patient Position - Orthostatic VS BP Location FiO2 (%)   Oral Monitor Sitting Right arm --      Pain Score       8           Vitals:    06/18/18 0630 06/18/18 0832   BP: 142/88 124/60   Pulse: 83 66   Patient Position - Orthostatic VS: Sitting Lying       Visual Acuity      ED Medications  Medications   nitrofurantoin (MACROBID) extended-release capsule 100 mg (not administered)   sodium chloride 0 9 % bolus 1,000 mL (1,000 mL Intravenous New Bag 6/18/18 0707)   ketorolac (TORADOL) injection 30 mg (30 mg Intravenous Given 6/18/18 0713)   diphenhydrAMINE (BENADRYL) injection 50 mg (50 mg Intravenous Given 6/18/18 0708)   famotidine (PEPCID) injection 20 mg (20 mg Intravenous Given 6/18/18 0710)   methylPREDNISolone sodium succinate (Solu-MEDROL) injection 125 mg (125 mg Intravenous Given 6/18/18 0716)       Diagnostic Studies  Results Reviewed     Procedure Component Value Units Date/Time    Urine culture [[id number]] Collected:  06/18/18 0784    Lab Status:   In process Specimen:  Urine from Urine, Clean Catch Updated:  06/18/18 8828    Urine Microscopic [[id number]]  (Abnormal) Collected:  06/18/18 0735    Lab Status:  Final result Specimen:  Urine from Urine, Clean Catch Updated:  06/18/18 0749     RBC, UA 10-20 (A) /hpf      WBC, UA 4-10 (A) /hpf      Epithelial Cells Moderate (A) /hpf      Bacteria, UA Occasional /hpf      Ca Oxalate [place], UA Occasional (A) /hpf      MUCOUS THREADS Moderate (A)    Comprehensive metabolic panel [[id number]]  (Abnormal) Collected:  06/18/18 0707    Lab Status:  Final result Specimen:  Blood from Arm, Right Updated:  06/18/18 0741     Sodium 139 mmol/L      Potassium 4 0 mmol/L      Chloride 105 mmol/L      CO2 25 mmol/L      Anion Gap 9 mmol/L      BUN 12 mg/dL      Creatinine 0 73 mg/dL      Glucose 94 mg/dL      Calcium 9 0 mg/dL      AST 40 U/L      ALT 50 U/L      Alkaline Phosphatase 96 U/L      Total Protein 6 9 g/dL      Albumin 3 3 (L) g/dL      Total Bilirubin 0 30 mg/dL      eGFR 110 ml/min/1 73sq m     Narrative:         National Kidney Disease Education Program recommendations are as follows:  GFR calculation is accurate only with a steady state creatinine  Chronic Kidney disease less than 60 ml/min/1 73 sq  meters  Kidney failure less than 15 ml/min/1 73 sq  meters      CK Total with Reflex CKMB [[id number]]  (Normal) Collected:  06/18/18 0707    Lab Status:  Final result Specimen:  Blood from Arm, Right Updated:  06/18/18 0741     Total CK 91 U/L     Lipase [[id number]]  (Normal) Collected:  06/18/18 0707    Lab Status:  Final result Specimen:  Blood from Arm, Right Updated:  06/18/18 0739     Lipase 79 u/L     ED Urine Macroscopic [[id number]]  (Abnormal) Collected:  06/18/18 0735    Lab Status:  Final result Specimen:  Urine Updated:  06/18/18 0730     Color, UA Yellow     Clarity, UA Cloudy     pH, UA 6 0     Leukocytes, UA Small (A)     Nitrite, UA Negative     Protein, UA 30 (1+) (A) mg/dl      Glucose, UA Negative mg/dl      Ketones, UA Negative mg/dl      Urobilinogen, UA 0 2 E U /dl      Bilirubin, UA Negative     Blood, UA Large (A)     Specific [place], UA 1 025    Narrative:       CLINITEK RESULT    CBC and differential [[id number]]  (Abnormal) Collected:  06/18/18 0707    Lab Status:  Final result Specimen:  Blood from Arm, Right Updated:  06/18/18 0722     WBC 6 18 Thousand/uL      RBC 4 80 Million/uL      Hemoglobin 12 5 g/dL      Hematocrit 39 6 %      MCV 83 fL      MCH 26 0 (L) pg      MCHC 31 6 g/dL      RDW 15 8 (H) %      MPV 10 5 fL      Platelets 712 Thousands/uL      Neutrophils Relative 65 %      Lymphocytes Relative 21 %      Monocytes Relative 6 %      Eosinophils Relative 7 (H) %      Basophils Relative 1 %      Neutrophils Absolute 4 03 Thousands/µL      Lymphocytes Absolute 1 32 Thousands/µL      Monocytes Absolute 0 37 Thousand/µL      Eosinophils Absolute 0 43 Thousand/µL      Basophils Absolute 0 03 Thousands/µL     POCT pregnancy, urine [[id number]]  (Normal) Resulted:  06/18/18 0701    Lab Status:  Final result Updated:  06/18/18 0701     EXT PREG TEST UR (Ref: Negative) neg                 CT renal stone study abdomen pelvis without contrast   ED Interpretation by Mateus Sullivan MD (06/18 0759)   FINDINGS:      RIGHT KIDNEY AND URETER:   Moderate hydronephrosis   Double-J catheter in the collecting system         There is a 7 x 2 mm calculus in the mid ureter (series 2, image 110 and series 601, image 82)   This has migrated from the midpole of the right kidney   The calculus lies adjacent to the stent within the ureter  Mild perinephric inflammation   No perinephric fluid collections  Stable 4 mm calculus in the anterior interpolar region (series 2, image 55 and series 601, image 104)  LEFT KIDNEY AND URETER:   There are stable, nonobstructing intrarenal calculi measuring on the order of 2 mm   No hydronephrosis or hydroureter  URINARY BLADDER:    Under distended   Double-J catheter in the urinary bladder   No bladder calculi  Bibasilar atelectasis  Limited low radiation dose noncontrast CT evaluation demonstrates no clinically significant abnormality of spleen, pancreas, or adrenal glands  The liver is enlarged with fatty infiltrative changes  No calcified gallstones or gallbladder wall thickening noted  No ascites or bulky lymphadenopathy on this limited noncontrast study  Moderate sized umbilical hernia containing fat  Scattered diverticula throughout the entire colon   Nothing to suggest acute diverticulitis  Limited evaluation demonstrates no evidence to suggest acute appendicitis  No acute fracture or destructive osseous lesion is identified  Impression:     1   Moderate hydronephrosis, despite double-J catheter  2   Inferior migration of 7 x 2 mm calculus from the lower pole of the right kidney   The calculus has now migrated into the mid ureter, adjacent to the stent  The study was marked in [facility] for immediate notification  Workstation performed: [id number]         Final Result by Carline Hanson DO (06/18 0756)   1  Moderate hydronephrosis, despite double-J catheter  2   Inferior migration of 7 x 2 mm calculus from the lower pole of the right kidney  The calculus has now migrated into the mid ureter, adjacent to the stent  The study was marked in [facility] for immediate notification  Workstation performed: [id number]                    Procedures  Procedures       Phone Contacts  ED Phone Contact    ED Course  ED Course as of Jun 18 0835   Mon Jun 18, 2018   0809 D/w Dr Anuja Ward who recommends abx and outpatient f/u for ureteroscopy and toradol Rx      0827 Patient states she cannot take oral toradol and can only take percocet and states she needs an increased dose  Patient more comfortable in ED prior to discharge                                   MDM  Number of Diagnoses or Management Options  Diagnosis management comments: DDx including but not limited to: renal colic, pyelonephritis, UTI, GI etiology, appendicitis, diverticulitis, cholecystitis, biliary colic, ureteral stent migration, rhabdomyolysis, tumor  DDX including but not limited to: allergic reaction, urticaria, doubt cellulitis, contact dermatitis, allergic dermatitis, poison ivy/oak/sumac; doubt vasculitis, herpes zoster, infectious etiology, scabies  Amount and/or Complexity of Data Reviewed  Clinical lab tests: ordered and reviewed  Tests in the radiology section of CPT®: ordered and reviewed  Decide to obtain previous medical records or to obtain history from someone other than the patient: yes  Independent visualization of images, tracings, or specimens: yes      CritCare Time    Disposition  Final diagnoses:   Renal colic on right side   UTI (urinary tract infection)   Allergic reaction, initial encounter     Time reflects when diagnosis was documented in both MDM as applicable and the Disposition within this note     Time User Action Codes Description Comment    6/18/2018  8:28 AM Jc Solan Add [D90] Renal colic on right side     6/18/2018  8:28 AM Jc Solan Add [N39 0] UTI (urinary tract infection)     6/18/2018  8:28 AM Jc Solan Add [T78 40XA] Allergic reaction, initial encounter       ED Disposition     ED Disposition Condition Comment    Discharge  Kaye Art Anne discharge to home/self care  Condition at discharge: Stable        Follow-up Information     Follow up With Specialties Details Why Martin Ramirez MD Family Medicine Call in 1 day Stop ditropan and flomax  Return sooner if increased itching, worsening rash, fever, vomiting, difficulty breathing or urinating  [address] [place] [address] For Urology [facility] Urology Call in 1 day Stop previous percocet  No driving with percocet  Do not use acetaminophen with percocet  Strain all urine   Return sooner if increased pain, difficulty urinating, vomiting, fever  Drink fluids  [address] [facility] [address]  [phone]          Patient's Medications   Discharge Prescriptions    DIPHENHYDRAMINE (BENADRYL) 50 MG CAPSULE    Take 1 capsule (50 mg total) by mouth every 6 (six) hours as needed for itching for up to 7 days       Start Date: 6/18/2018 End Date: 6/25/2018       Order Dose: 50 mg       Quantity: 25 capsule    Refills: 0    FAMOTIDINE (PEPCID) 20 MG TABLET    Take 1 tablet (20 mg total) by mouth 2 (two) times a day for 5 days       Start Date: 6/18/2018 End Date: 6/23/2018       Order Dose: 20 mg       Quantity: 10 tablet    Refills: 0    NITROFURANTOIN (MACROBID) 100 MG CAPSULE    Take 1 capsule (100 mg total) by mouth 2 (two) times a day for 7 days       Start Date: 6/18/2018 End Date: 6/25/2018       Order Dose: 100 mg       Quantity: 14 capsule    Refills: 0    OXYCODONE-ACETAMINOPHEN (PERCOCET)  MG PER TABLET    Take 1 tablet by mouth every 4 (four) hours as needed for moderate pain for up to 5 days Max Daily Amount: 6 tablets       Start Date: 6/18/2018 End Date: 6/23/2018       Order Dose: 1 tablet       Quantity: 20 tablet    Refills: 0    PREDNISONE 20 MG TABLET    Take 1 tablet (20 mg total) by mouth daily for 5 days Take 3 tabs daily for 5 days  Start Date: 6/18/2018 End Date: 6/23/2018       Order Dose: 20 mg       Quantity: 15 tablet    Refills: 0     No discharge procedures on file      ED Provider  Electronically Signed by           Minh Cotton MD  06/18/18 7709

## 2018-06-18 NOTE — ED NOTES
Pt appeared to be in no acute distress upon discharge  Verbal understanding obtained from pt on d/c instructions as well as Rx and follow up care  Pt able to ambulate well without assistance upon exiting       Gatito Magaña RN  06/18/18 5741

## 2018-06-18 NOTE — DISCHARGE INSTRUCTIONS
General Allergic Reaction   WHAT YOU NEED TO KNOW:   An allergic reaction is your body's response to an allergen  Allergens include medicines, food, insect stings, animal dander, mold, latex, chemicals, and dust mites  Pollen from trees, grass, and weeds can also cause an allergic reaction  DISCHARGE INSTRUCTIONS:   Return to the emergency department if:   · You have a skin rash, hives, swelling, or itching that gets worse  · You have trouble breathing, shortness of breath, wheezing, or coughing  · Your throat tightens, or your lips or tongue swell  · You have trouble swallowing or speaking  · You have dizziness, lightheadedness, fainting, or confusion  · You have nausea, vomiting, diarrhea, or abdominal cramps  · You have chest pain or tightness  Contact your healthcare provider if:   · You have questions or concerns about your condition or care  Medicines:   · Medicines  may be given to relieve certain allergy symptoms such as itching, sneezing, and swelling  You may take them as a pill or use drops in your nose or eyes  Topical treatments may be given to put directly on your skin to help decrease itching or swelling  · Take your medicine as directed  Contact your healthcare provider if you think your medicine is not helping or if you have side effects  Tell him of her if you are allergic to any medicine  Keep a list of the medicines, vitamins, and herbs you take  Include the amounts, and when and why you take them  Bring the list or the pill bottles to follow-up visits  Carry your medicine list with you in case of an emergency  Follow up with your healthcare provider as directed:  Write down your questions so you remember to ask them during your visits  Self-care:   · Avoid the allergen  that you think may have caused your allergic reaction  · Use cold compresses  on your skin or eyes if they were affected by the allergic reaction   Cold compresses may help to soothe your skin or eyes     · Rinse your nasal passages  with a saline solution  Daily rinsing may help clear your nose of allergens  · Do not smoke  Your allergy symptoms may decrease if you are not around smoke  Nicotine and other chemicals in cigarettes and cigars can also cause lung damage  Ask your healthcare provider for information if you currently smoke and need help to quit  E-cigarettes or smokeless tobacco still contain nicotine  Talk to your healthcare provider before you use these products  © 2017 [employer]  Information is for End User's use only and may not be sold, redistributed or otherwise used for commercial purposes  All illustrations and images included in CareNotes® are the copyrighted property of A D A M , Inc  or [employer]  The above information is an  only  It is not intended as medical advice for individual conditions or treatments  Talk to your doctor, nurse or pharmacist before following any medical regimen to see if it is safe and effective for you  Renal Colic   WHAT YOU NEED TO KNOW:   Renal colic is severe pain in your lower back or sides  The pain is usually on one side, but may be on both sides of your lower back  Renal colic may start quickly, come and go, and become worse over time  Renal colic is caused by a blockage in your urinary tract  The most common cause of a blockage is a kidney stone  Blood clots, ureter spasms, and dead tissue may also block your urinary tract  DISCHARGE INSTRUCTIONS:   Return to the emergency department if:   · You cannot stop vomiting  · You see new or increased bleeding when you urinate  · You are urinating less than usual, or not at all  · Your pain is not getting better even after treatment  Contact your healthcare provider if:   · You have fever  · You need to urinate more often than usual, or right away  · You see a stone in your urine strainer after you urinate      · You have questions or concerns about your condition or care  Medicines:   · Medicines  may help decrease pain and muscle spasms  You may also need medicine to calm your stomach and stop vomiting  · Take your medicine as directed  Contact your healthcare provider if you think your medicine is not helping or if you have side effects  Tell him of her if you are allergic to any medicine  Keep a list of the medicines, vitamins, and herbs you take  Include the amounts, and when and why you take them  Bring the list or the pill bottles to follow-up visits  Carry your medicine list with you in case of an emergency  Manage your symptoms:   · Drink liquids as directed  to help decrease pain and flush blockages from your urinary tract  Ask how much liquid to drink each day and which liquids are best for you  You may need to drink about 3 liters (12 glasses) of liquids each day  Half of your total daily liquids should be water  Limit coffee, tea, and soda to 2 cups daily  Your urine should be pale and clear  · Strain your urine every time you urinate  Urinate into a strainer (funnel with a fine mesh on the bottom) or glass jar to collect kidney stones  Give the kidney stones to your healthcare provider at your next visit  · Eat a variety of healthy foods  Healthy foods include fruits, vegetables, whole-grain breads, low-fat dairy products, beans, lean meats, and fish  You may need to increase the amount of citrus fruit you eat, such as oranges  Ask your healthcare provider how much salt, calcium, and protein you should eat  · Avoid activity in hot temperatures  Heat may cause you to become dehydrated and urinate less  Follow up with your healthcare provider as directed: You may need to return for tests to check if your blockage has cleared   Write down your questions so you remember to ask them during your visits  © 2017 [employer] Information is for End User's use only and may not be sold, redistributed or otherwise used for commercial purposes  All illustrations and images included in CareNotes® are the copyrighted property of A D A M , Inc  or Francis Hernandez  The above information is an  only  It is not intended as medical advice for individual conditions or treatments  Talk to your doctor, nurse or pharmacist before following any medical regimen to see if it is safe and effective for you  Urinary Tract Infection in Women   WHAT YOU NEED TO KNOW:   A urinary tract infection (UTI) is caused by bacteria that get inside your urinary tract  Most bacteria that enter your urinary tract come out when you urinate  If the bacteria stay in your urinary tract, you may get an infection  Your urinary tract includes your kidneys, ureters, bladder, and urethra  Urine is made in your kidneys, and it flows from the ureters to the bladder  Urine leaves the bladder through the urethra  A UTI is more common in your lower urinary tract, which includes your bladder and urethra  DISCHARGE INSTRUCTIONS:   Return to the emergency department if:   · You are urinating very little or not at all  · You have a high fever with shaking chills  · You have side or back pain that gets worse  Contact your healthcare provider if:   · You have a fever  · You do not feel better after 2 days of taking antibiotics  · You are vomiting  · You have questions or concerns about your condition or care  Medicines:   · Antibiotics  help fight a bacterial infection  · Medicines  may be given to decrease pain and burning when you urinate  They will also help decrease the feeling that you need to urinate often  These medicines will make your urine orange or red  · Take your medicine as directed  Contact your healthcare provider if you think your medicine is not helping or if you have side effects  Tell him or her if you are allergic to any medicine   Keep a list of the medicines, vitamins, and herbs you take  Include the amounts, and when and why you take them  Bring the list or the pill bottles to follow-up visits  Carry your medicine list with you in case of an emergency  Follow up with your healthcare provider as directed:  Write down your questions so you remember to ask them during your visits  Prevent another UTI:   · Empty your bladder often  Urinate and empty your bladder as soon as you feel the need  Do not hold your urine for long periods of time  · Wipe from front to back after you urinate or have a bowel movement  This will help prevent germs from getting into your urinary tract through your urethra  · Drink liquids as directed  Ask how much liquid to drink each day and which liquids are best for you  You may need to drink more liquids than usual to help flush out the bacteria  Do not drink alcohol, caffeine, or citrus juices  These can irritate your bladder and increase your symptoms  Your healthcare provider may recommend cranberry juice to help prevent a UTI  · Urinate after you have sex  This can help flush out bacteria passed during sex  · Do not douche or use feminine deodorants  These can change the chemical balance in your vagina  · Change sanitary pads or tampons often  This will help prevent germs from getting into your urinary tract  · Do pelvic muscle exercises often  Pelvic muscle exercises may help you start and stop urinating  Strong pelvic muscles may help you empty your bladder easier  Squeeze these muscles tightly for 5 seconds like you are trying to hold back urine  Then relax for 5 seconds  Gradually work up to squeezing for 10 seconds  Do 3 sets of 15 repetitions a day, or as directed  © 2017 [employer] Information is for End User's use only and may not be sold, redistributed or otherwise used for commercial purposes   All illustrations and images included in CareNotes® are the copyrighted property of A D A M , Inc  or [employer]  The above information is an  only  It is not intended as medical advice for individual conditions or treatments  Talk to your doctor, nurse or pharmacist before following any medical regimen to see if it is safe and effective for you

## 2018-06-19 ENCOUNTER — TELEPHONE (OUTPATIENT)
Dept: UROLOGY | Facility: CLINIC | Age: 33
End: 2018-06-19

## 2018-06-19 LAB — BACTERIA UR CULT: NORMAL

## 2018-06-25 ENCOUNTER — HOSPITAL ENCOUNTER (EMERGENCY)
Facility: HOSPITAL | Age: 33
Discharge: HOME | End: 2018-06-25
Attending: EMERGENCY MEDICINE | Admitting: EMERGENCY MEDICINE
Payer: COMMERCIAL

## 2018-06-25 VITALS
TEMPERATURE: 98.5 F | OXYGEN SATURATION: 97 % | WEIGHT: 270.5 LBS | SYSTOLIC BLOOD PRESSURE: 126 MMHG | HEART RATE: 72 BPM | RESPIRATION RATE: 17 BRPM | DIASTOLIC BLOOD PRESSURE: 60 MMHG | BODY MASS INDEX: 45.86 KG/M2

## 2018-06-25 DIAGNOSIS — R10.9 FLANK PAIN: Primary | ICD-10-CM

## 2018-06-25 DIAGNOSIS — R73.9 HYPERGLYCEMIA: ICD-10-CM

## 2018-06-25 LAB
ANION GAP SERPL CALCULATED.3IONS-SCNC: 10 MMOL/L (ref 4–13)
BACTERIA UR QL AUTO: ABNORMAL /HPF
BASOPHILS # BLD AUTO: 0.02 THOUSANDS/ΜL (ref 0–0.1)
BASOPHILS NFR BLD AUTO: 0 % (ref 0–1)
BILIRUB UR QL STRIP: NEGATIVE
BUN SERPL-MCNC: 12 MG/DL (ref 5–25)
CALCIUM SERPL-MCNC: 8.8 MG/DL (ref 8.3–10.1)
CAOX CRY URNS QL MICRO: ABNORMAL /HPF
CHLORIDE SERPL-SCNC: 103 MMOL/L (ref 100–108)
CLARITY UR: CLEAR
CO2 SERPL-SCNC: 24 MMOL/L (ref 21–32)
COLOR UR: YELLOW
CREAT SERPL-MCNC: 0.75 MG/DL (ref 0.6–1.3)
EOSINOPHIL # BLD AUTO: 0.23 THOUSAND/ΜL (ref 0–0.61)
EOSINOPHIL NFR BLD AUTO: 3 % (ref 0–6)
ERYTHROCYTE [DISTWIDTH] IN BLOOD BY AUTOMATED COUNT: 16.1 % (ref 11.6–15.1)
EXT PREG TEST URINE: NEGATIVE
GFR SERPL CREATININE-BSD FRML MDRD: 107 ML/MIN/1.73SQ M
GLUCOSE SERPL-MCNC: 187 MG/DL (ref 65–140)
GLUCOSE UR STRIP-MCNC: NEGATIVE MG/DL
HCT VFR BLD AUTO: 40.5 % (ref 34.8–46.1)
HGB BLD-MCNC: 12.8 G/DL (ref 11.5–15.4)
HGB UR QL STRIP.AUTO: ABNORMAL
KETONES UR STRIP-MCNC: NEGATIVE MG/DL
LEUKOCYTE ESTERASE UR QL STRIP: ABNORMAL
LYMPHOCYTES # BLD AUTO: 1.31 THOUSANDS/ΜL (ref 0.6–4.47)
LYMPHOCYTES NFR BLD AUTO: 20 % (ref 14–44)
MCH RBC QN AUTO: 26 PG (ref 26.8–34.3)
MCHC RBC AUTO-ENTMCNC: 31.6 G/DL (ref 31.4–37.4)
MCV RBC AUTO: 82 FL (ref 82–98)
MONOCYTES # BLD AUTO: 0.32 THOUSAND/ΜL (ref 0.17–1.22)
MONOCYTES NFR BLD AUTO: 5 % (ref 4–12)
NEUTROPHILS # BLD AUTO: 4.85 THOUSANDS/ΜL (ref 1.85–7.62)
NEUTS SEG NFR BLD AUTO: 72 % (ref 43–75)
NITRITE UR QL STRIP: NEGATIVE
NON-SQ EPI CELLS URNS QL MICRO: ABNORMAL /HPF
PH UR STRIP.AUTO: 6 [PH] (ref 4.5–8)
PLATELET # BLD AUTO: 291 THOUSANDS/UL (ref 149–390)
PMV BLD AUTO: 10.8 FL (ref 8.9–12.7)
POTASSIUM SERPL-SCNC: 3.9 MMOL/L (ref 3.5–5.3)
PROT UR STRIP-MCNC: ABNORMAL MG/DL
RBC # BLD AUTO: 4.92 MILLION/UL (ref 3.81–5.12)
RBC #/AREA URNS AUTO: ABNORMAL /HPF
SODIUM SERPL-SCNC: 137 MMOL/L (ref 136–145)
SP GR UR STRIP.AUTO: >=1.03 (ref 1–1.03)
UROBILINOGEN UR QL STRIP.AUTO: 0.2 E.U./DL
WBC # BLD AUTO: 6.73 THOUSAND/UL (ref 4.31–10.16)
WBC #/AREA URNS AUTO: ABNORMAL /HPF

## 2018-06-25 PROCEDURE — 96374 THER/PROPH/DIAG INJ IV PUSH: CPT

## 2018-06-25 PROCEDURE — 99284 EMERGENCY DEPT VISIT MOD MDM: CPT

## 2018-06-25 PROCEDURE — 81025 URINE PREGNANCY TEST: CPT | Performed by: EMERGENCY MEDICINE

## 2018-06-25 PROCEDURE — 80048 BASIC METABOLIC PNL TOTAL CA: CPT | Performed by: EMERGENCY MEDICINE

## 2018-06-25 PROCEDURE — 96375 TX/PRO/DX INJ NEW DRUG ADDON: CPT

## 2018-06-25 PROCEDURE — 36415 COLL VENOUS BLD VENIPUNCTURE: CPT | Performed by: EMERGENCY MEDICINE

## 2018-06-25 PROCEDURE — 81001 URINALYSIS AUTO W/SCOPE: CPT

## 2018-06-25 PROCEDURE — 85025 COMPLETE CBC W/AUTO DIFF WBC: CPT | Performed by: EMERGENCY MEDICINE

## 2018-06-25 RX ORDER — KETOROLAC TROMETHAMINE 30 MG/ML
10 INJECTION, SOLUTION INTRAMUSCULAR; INTRAVENOUS ONCE
Status: COMPLETED | OUTPATIENT
Start: 2018-06-25 | End: 2018-06-25

## 2018-06-25 RX ORDER — OXYCODONE HYDROCHLORIDE 5 MG/1
5 CAPSULE ORAL EVERY 6 HOURS PRN
Qty: 20 CAPSULE | Refills: 0 | Status: SHIPPED | OUTPATIENT
Start: 2018-06-25 | End: 2018-06-28 | Stop reason: SDUPTHER

## 2018-06-25 RX ORDER — MORPHINE SULFATE 10 MG/ML
6 INJECTION, SOLUTION INTRAMUSCULAR; INTRAVENOUS ONCE
Status: COMPLETED | OUTPATIENT
Start: 2018-06-25 | End: 2018-06-25

## 2018-06-25 RX ORDER — ONDANSETRON 2 MG/ML
4 INJECTION INTRAMUSCULAR; INTRAVENOUS ONCE
Status: COMPLETED | OUTPATIENT
Start: 2018-06-25 | End: 2018-06-25

## 2018-06-25 RX ORDER — ONDANSETRON 4 MG/1
8 TABLET, ORALLY DISINTEGRATING ORAL EVERY 8 HOURS PRN
Qty: 10 TABLET | Refills: 3 | Status: SHIPPED | OUTPATIENT
Start: 2018-06-25 | End: 2018-10-11 | Stop reason: ALTCHOICE

## 2018-06-25 RX ADMIN — MORPHINE SULFATE 6 MG: 10 INJECTION INTRAVENOUS at 10:50

## 2018-06-25 RX ADMIN — KETOROLAC TROMETHAMINE 9.9 MG: 30 INJECTION, SOLUTION INTRAMUSCULAR at 10:45

## 2018-06-25 RX ADMIN — ONDANSETRON 4 MG: 2 INJECTION INTRAMUSCULAR; INTRAVENOUS at 11:45

## 2018-06-25 NOTE — ED NOTES
Pt laying on left side on stretcher using cell phone with negative complications  Pt stated pain is now 4/10 but "i'm nauseated now"   MD notified, will medication per MD order     Oanh Monte, RN  06/25/18 8028

## 2018-06-25 NOTE — DISCHARGE INSTRUCTIONS
Diagnosis: right flank pain -- hematuria- blood in urine-- likely related to right ureteral  Stent // hyperglycemia- elevated blood sugar 187      - activity as tolerated- diet as tolerated    - expect more flank pain --- for pain - would recommend taking over the counter generic ibuprofen 400 mg - taken together with over the counter  Generic tylenol 500 mg 4 times a day with liquids/ meals    -- for severe pain- oxycodone start at 1 tablet every 4-6 hrs and can increase as needed     - for any nausea/ vomiting/ zofran 2 tablets dissolve in the mouth every 4-6 hrs as needed     - your blood sugar was elevated in the er 187-- this needs to be rechecked with in 1 week -- please call your primary doctor when you get home to schedule an appointment for a recheck     - please return to  The er for any fevers- temp > 100 4/ any worsening/ intractable pain // any persistent vomiting or any new/ worsening/concerning symptoms to you

## 2018-06-25 NOTE — ED PROVIDER NOTES
History  Chief Complaint   Patient presents with    Flank Pain     recently inpatient for UTI, IV abd and right stent placement  continues to c/o right flank pain radiating to abd and right leg, and blood in urine  33 yr female d/x with r kidney stone- approx 3 weeks ago- was subsequently admitted for / urine infection with stone- had r ureteral stent placed andwas d/c- with pain 7/17 for rerpeat lithotripsy- returns with over weekend simialr r flank pain -- with heamtutria-- no fevers- no vomiting-- no dysuria- no other new sympotms or comps        History provided by:  Patient   used: No    Flank Pain   Associated symptoms: hematuria    Associated symptoms: no dysuria, no vaginal bleeding and no vaginal discharge        Prior to Admission Medications   Prescriptions Last Dose Informant Patient Reported? Taking?    ALPRAZolam (XANAX) 0 5 mg tablet   No No   Sig: Take 1 tablet (0 5 mg total) by mouth daily as needed for anxiety   Methylprednisolone 4 MG TBPK   No No   Sig: Use as directed on package   amLODIPine (NORVASC) 2 5 mg tablet  Self No No   Sig: Take 2 tablets by mouth daily   diphenhydrAMINE (BENADRYL) 50 mg capsule   No No   Sig: Take 1 capsule (50 mg total) by mouth every 6 (six) hours as needed for itching for up to 7 days   docusate sodium (COLACE) 100 mg capsule   No No   Sig: Take 1 capsule (100 mg total) by mouth 3 (three) times a day for 30 days   famotidine (PEPCID) 20 mg tablet   No No   Sig: Take 1 tablet (20 mg total) by mouth 2 (two) times a day for 5 days   hydrOXYzine HCL (ATARAX) 25 mg tablet   No No   Sig: Take 1 tablet (25 mg total) by mouth every 6 (six) hours as needed for itching or anxiety for up to 20 doses   nitrofurantoin (MACROBID) 100 mg capsule   No No   Sig: Take 1 capsule (100 mg total) by mouth 2 (two) times a day for 7 days   ondansetron (ZOFRAN ODT) 4 mg disintegrating tablet   No No   Sig: Take 2 tablets (8 mg total) by mouth every 8 (eight) hours as needed for nausea or vomiting for up to 10 doses   oxyCODONE (OXY-IR) 5 MG capsule   No No   Sig: Take 1 capsule (5 mg total) by mouth every 6 (six) hours as needed for severe pain for up to 15 doses Max Daily Amount: 20 mg   oxybutynin (DITROPAN) 5 mg tablet   No No   Sig: Take 1 tablet (5 mg total) by mouth 2 (two) times a day as needed (stent colic and pain) for up to 30 days   tamsulosin (FLOMAX) 0 4 mg   No No   Sig: Take 1 capsule (0 4 mg total) by mouth daily with dinner      Facility-Administered Medications: None       Past Medical History:   Diagnosis Date    Anxiety     Gestational diabetes     Gestational diabetes     Hypertension     Obesity        Past Surgical History:   Procedure Laterality Date    FL CYSTO/URETERO W/LITHOTRIPSY &INDWELL STENT INSRT Right 6/12/2018    Procedure: CYSTOSCOPY URETEROSCOPY WITH LITHOTRIPSY HOLMIUM LASER, RETROGRADE PYELOGRAM AND INSERTION STENT URETERAL;  Surgeon: Nolan Palmer MD;  Location[facility];  Service: Urology    RENAL ARTERY STENT  2015       Family History   Problem Relation Age of Onset    Diabetes Mother     Hypertension Mother     Muscular dystrophy Family      I have reviewed and agree with the history as documented  Social History   Substance Use Topics    Smoking status: Current Every Day Smoker     Packs/day: 1 00     Types: Cigarettes    Smokeless tobacco: Never Used    Alcohol use Yes      Comment: occasional        Review of Systems   Constitutional: Negative  HENT: Negative  Eyes: Negative  Respiratory: Negative  Cardiovascular: Negative  Gastrointestinal: Negative  Endocrine: Negative  Genitourinary: Positive for flank pain and hematuria  Negative for decreased urine volume, difficulty urinating, dyspareunia, dysuria, enuresis, frequency, genital sores, menstrual problem, pelvic pain, urgency, vaginal bleeding, vaginal discharge and vaginal pain     Musculoskeletal: Negative for arthralgias, back pain, gait problem, joint swelling, myalgias, neck pain and neck stiffness  Skin: Negative  Allergic/Immunologic: Negative  Neurological: Negative  Hematological: Negative  Psychiatric/Behavioral: Negative  Physical Exam  Physical Exam   Constitutional: She is oriented to person, place, and time  She appears well-developed and well-nourished  No distress  avss-- pulse ox 99 % on ra- intepretation is normal- no intervention - well appearing- in nad   HENT:   Head: Normocephalic and atraumatic  Right Ear: External ear normal    Left Ear: External ear normal    Nose: Nose normal    Mouth/Throat: Oropharynx is clear and moist  No oropharyngeal exudate  Eyes: Conjunctivae and EOM are normal  Pupils are equal, round, and reactive to light  Right eye exhibits no discharge  Left eye exhibits no discharge  No scleral icterus  Neck: Normal range of motion  Neck supple  No JVD present  No tracheal deviation present  No thyromegaly present  Cardiovascular: Normal rate, regular rhythm, normal heart sounds and intact distal pulses  Exam reveals no gallop and no friction rub  No murmur heard  Pulmonary/Chest: Effort normal and breath sounds normal  No stridor  No respiratory distress  She has no wheezes  She has no rales  She exhibits no tenderness  Abdominal: Soft  Bowel sounds are normal  She exhibits no distension and no mass  There is tenderness  There is no rebound and no guarding  No hernia    r upper flank pain- r cva tendenress- no peritoenal signs   Musculoskeletal: Normal range of motion  She exhibits no edema, tenderness or deformity  Lymphadenopathy:     She has no cervical adenopathy  Neurological: She is alert and oriented to person, place, and time  No cranial nerve deficit or sensory deficit  She exhibits normal muscle tone  Coordination normal    Skin: Skin is warm  Capillary refill takes less than 2 seconds  No rash noted  She is not diaphoretic  No erythema  No pallor  Psychiatric: She has a normal mood and affect  Her behavior is normal  Judgment and thought content normal    Nursing note and vitals reviewed  Vital Signs  ED Triage Vitals   Temperature Pulse Respirations Blood Pressure SpO2   06/25/18 1034 06/25/18 1032 06/25/18 1032 06/25/18 1034 06/25/18 1032   98 5 °F (36 9 °C) 74 18 121/58 99 %      Temp Source Heart Rate Source Patient Position - Orthostatic VS BP Location FiO2 (%)   06/25/18 1034 06/25/18 1032 -- -- --   Oral Monitor         Pain Score       --                  Vitals:    06/25/18 1032 06/25/18 1034   BP:  121/58   Pulse: 74        Visual Acuity      ED Medications  Medications   morphine (PF) 10 mg/mL injection 6 mg (not administered)   ketorolac (TORADOL) injection 9 9 mg (not administered)       Diagnostic Studies  Results Reviewed     Procedure Component Value Units Date/Time    POCT pregnancy, urine [[id number]]     Lab Status:  No result     CBC and differential [[id number]]     Lab Status:  No result Specimen:  Blood     Basic metabolic panel [[id number]]     Lab Status:  No result Specimen:  Blood                  No orders to display              Procedures  Procedures       Phone Contacts  ED Phone Contact    ED Course  ED Course as of Jun 25 1230   Mon Jun 25, 2018   1049 Er md note- previous labs/ ct scan/ urology note- urines reviewed by er md     1145 Er md note- [facility] urology paged- pt aware ogf await call back    1226 -- er md note- case d/w- estevan from  urology -- feels is stent based pain -- rec pain control -- and d/c--                                 [facility]  CritCare Time    Disposition  Final diagnoses:   None     ED Disposition     None      Follow-up Information    None         Patient's Medications   Discharge Prescriptions    No medications on file     No discharge procedures on file      ED Provider  Electronically Signed by           uEn Pisano MD  06/25/18 1761

## 2018-06-25 NOTE — ED NOTES
Discharge instructions reviewed with pt by Dr Andrea Aviles   Pt      Jhoana Grullon, ADA  06/25/18 1983

## 2018-06-28 ENCOUNTER — TELEPHONE (OUTPATIENT)
Dept: FAMILY MEDICINE CLINIC | Facility: CLINIC | Age: 33
End: 2018-06-28

## 2018-06-28 ENCOUNTER — OFFICE VISIT (OUTPATIENT)
Dept: FAMILY MEDICINE CLINIC | Facility: CLINIC | Age: 33
End: 2018-06-28
Payer: COMMERCIAL

## 2018-06-28 VITALS
BODY MASS INDEX: 44.15 KG/M2 | HEART RATE: 68 BPM | DIASTOLIC BLOOD PRESSURE: 74 MMHG | HEIGHT: 65 IN | RESPIRATION RATE: 16 BRPM | WEIGHT: 265 LBS | TEMPERATURE: 97.3 F | SYSTOLIC BLOOD PRESSURE: 122 MMHG

## 2018-06-28 DIAGNOSIS — R10.9 FLANK PAIN: Primary | ICD-10-CM

## 2018-06-28 DIAGNOSIS — R10.9 FLANK PAIN: ICD-10-CM

## 2018-06-28 DIAGNOSIS — R30.0 DYSURIA: Primary | ICD-10-CM

## 2018-06-28 DIAGNOSIS — R42 DIZZINESS: ICD-10-CM

## 2018-06-28 DIAGNOSIS — N20.0 KIDNEY STONE ON RIGHT SIDE: ICD-10-CM

## 2018-06-28 LAB
BACTERIA UR QL AUTO: ABNORMAL /HPF
BILIRUB UR QL STRIP: NEGATIVE
CLARITY UR: ABNORMAL
COLOR UR: YELLOW
GLUCOSE UR STRIP-MCNC: NEGATIVE MG/DL
HGB UR QL STRIP.AUTO: ABNORMAL
HYALINE CASTS #/AREA URNS LPF: ABNORMAL /LPF
KETONES UR STRIP-MCNC: NEGATIVE MG/DL
LEUKOCYTE ESTERASE UR QL STRIP: ABNORMAL
NITRITE UR QL STRIP: NEGATIVE
NON-SQ EPI CELLS URNS QL MICRO: ABNORMAL /HPF
PH UR STRIP.AUTO: 7 [PH] (ref 4.5–8)
PROT UR STRIP-MCNC: ABNORMAL MG/DL
RBC #/AREA URNS AUTO: ABNORMAL /HPF
SL AMB  POCT GLUCOSE, UA: NEGATIVE
SL AMB LEUKOCYTE ESTERASE,UA: 7.5
SL AMB POCT BILIRUBIN,UA: NEGATIVE
SL AMB POCT BLOOD,UA: ABNORMAL
SL AMB POCT CLARITY,UA: ABNORMAL
SL AMB POCT COLOR,UA: YELLOW
SL AMB POCT KETONES,UA: NEGATIVE
SL AMB POCT NITRITE,UA: NEGATIVE
SL AMB POCT PH,UA: 7
SL AMB POCT SPECIFIC GRAVITY,UA: 1.01
SL AMB POCT URINE PROTEIN: ABNORMAL
SL AMB POCT UROBILINOGEN: 0.2
SP GR UR STRIP.AUTO: 1.02 (ref 1–1.03)
UROBILINOGEN UR QL STRIP.AUTO: 0.2 E.U./DL
WBC #/AREA URNS AUTO: ABNORMAL /HPF

## 2018-06-28 PROCEDURE — 81001 URINALYSIS AUTO W/SCOPE: CPT | Performed by: FAMILY MEDICINE

## 2018-06-28 PROCEDURE — 99213 OFFICE O/P EST LOW 20 MIN: CPT | Performed by: FAMILY MEDICINE

## 2018-06-28 PROCEDURE — 3725F SCREEN DEPRESSION PERFORMED: CPT | Performed by: FAMILY MEDICINE

## 2018-06-28 PROCEDURE — 81003 URINALYSIS AUTO W/O SCOPE: CPT | Performed by: FAMILY MEDICINE

## 2018-06-28 RX ORDER — IBUPROFEN 800 MG/1
800 TABLET ORAL EVERY 8 HOURS PRN
Qty: 90 TABLET | Refills: 0 | Status: SHIPPED | OUTPATIENT
Start: 2018-06-28 | End: 2018-10-01 | Stop reason: ALTCHOICE

## 2018-06-28 RX ORDER — HYDROXYZINE HYDROCHLORIDE 25 MG/1
25 TABLET, FILM COATED ORAL 2 TIMES DAILY PRN
Qty: 60 TABLET | Refills: 1 | Status: SHIPPED | OUTPATIENT
Start: 2018-06-28 | End: 2018-10-01 | Stop reason: ALTCHOICE

## 2018-06-28 RX ORDER — OXYCODONE HYDROCHLORIDE 5 MG/1
5 CAPSULE ORAL EVERY 6 HOURS PRN
Qty: 40 CAPSULE | Refills: 0 | Status: SHIPPED | OUTPATIENT
Start: 2018-06-28 | End: 2018-06-28 | Stop reason: ALTCHOICE

## 2018-06-28 RX ORDER — OXYCODONE HYDROCHLORIDE 10 MG/1
10 TABLET ORAL EVERY 6 HOURS PRN
Qty: 40 TABLET | Refills: 0 | Status: SHIPPED | OUTPATIENT
Start: 2018-06-28 | End: 2018-07-03 | Stop reason: SDUPTHER

## 2018-06-28 NOTE — TELEPHONE ENCOUNTER
The oxycodone q6 hours is sufficient, I don't want her taking it more frequently than that  I will not be changing that script  Thank you for taking care of the Atarax

## 2018-06-28 NOTE — TELEPHONE ENCOUNTER
Front staff: please let patient know I resent the medication to the pharmacy at 10mg  Spoke with [facility] at the pharmacy  Cancelled the 5mg prescription  PDMP verified and 10mg was previously dispense

## 2018-06-28 NOTE — TELEPHONE ENCOUNTER
Pt seen by Dr Abdon Vega this AM the Atarax was set to print so it didn't go to [facility] on [address] [facility]  Also she said they told her the Oxycodne needs to be changed to 10mg tablet to take 1 every 4hrs as needed   She said that was what was ordered on 6/25/18

## 2018-06-28 NOTE — TELEPHONE ENCOUNTER
Pt was seen today by Dr Dom Wong , pt  mentioned that the doctor refilled her Oxycodone but it was suppose to be 10mg not 5mg  Please call pt with any further questions,  [phone]   Thanks

## 2018-06-28 NOTE — PROGRESS NOTES
Assessment/Plan:     Dysuria  -     POCT urine dip auto non-scope  -     UA w Reflex to Microscopic w Reflex to Culture - Clinic Collect    -     hydrOXYzine HCL (ATARAX) 25 mg tablet; Take 1 tablet (25 mg total) by mouth 2 (two) times a day as needed for itching or anxiety for up to 20 doses    Kidney stone on right side  Flank pain  -     oxyCODONE (OXY-IR) 5 MG capsule; Take 1 capsule (5 mg total) by mouth every 6 (six) hours as needed for severe pain Max Daily Amount: 20 mg  -     ibuprofen (MOTRIN) 800 mg tablet; Take 1 tablet (800 mg total) by mouth every 8 (eight) hours as needed for moderate pain    In-office UA c/w recent [facility] visits, likely due to stone/stent, will send for UCx  Allergy to tamsulosin and oxybutynin, will continue with oxycodone until procedure, expect not to need after lithotripsy  Subjective:     Patient ID: Corina Jack is a 33 y o  female  Chief Complaint   Patient presents with    Urinary Tract Infection     PT believes her UTI is back she is having pain has had sugery done on kidney        HPI  Patient presents concerned she has UTI again  Recently in the hospital for flank pain  Stone found, stent placed and scheduled for lithotripsy in July  Having persistent hematuria and flank pain since then  Was given oxybutynin and tamsulosin initially but had reaction (skin peeling) and that was discontinued  Has been in and out of the [facility] due to the pain  Here requesting pain medication  Also would like refill of Atarax, helps with her anxiety and itchy that has developed secondary to the skin peeling from the other medications  Review of Systems   Constitutional: Negative for chills, diaphoresis, fatigue and fever  Respiratory: Negative  Cardiovascular: Negative  Gastrointestinal: Negative  Genitourinary: Positive for dysuria, flank pain and hematuria  Negative for difficulty urinating, enuresis, frequency and urgency     Skin:        As noted in HPI Objective:     Physical Exam   Constitutional: She is oriented to person, place, and time  She appears well-developed and well-nourished  HENT:   Head: Normocephalic and atraumatic  Eyes: Conjunctivae are normal  Pupils are equal, round, and reactive to light  Neck: Normal range of motion  Neck supple  Cardiovascular: Normal rate, regular rhythm and normal heart sounds  Pulmonary/Chest: Effort normal and breath sounds normal    Abdominal: Soft  There is tenderness  Right flank tenderness   Musculoskeletal: Normal range of motion  Neurological: She is alert and oriented to person, place, and time  She has normal reflexes  Skin:   Bilateral arms with erythema and skin peeling   Vitals reviewed

## 2018-06-28 NOTE — TELEPHONE ENCOUNTER
Patient calling again,  She found the pill bottle  According to patient it must read:    Oxycodone HCL 10 mg tablets  Take one tablet every 4 hrs as needed for severe pain

## 2018-06-30 ENCOUNTER — TELEPHONE (OUTPATIENT)
Dept: FAMILY MEDICINE CLINIC | Facility: CLINIC | Age: 33
End: 2018-06-30

## 2018-06-30 DIAGNOSIS — R10.9 FLANK PAIN: ICD-10-CM

## 2018-06-30 NOTE — TELEPHONE ENCOUNTER
Received message on triage line and returned call- Patient was recently seen in our office and was prescribed oxycodone for kidney stone pain  The patient states that because of how the prescription is worded she is only able to get 20 pills instead of the 40 prescribed  She states she has 20 pills currently and has enough to get through the weekend  As this is not an emergency I advised that she call the office on Monday  Of note- On review of PDMP, she has had multiple narcotic prescriptions in the past month:    6/6- 15 pills Oxycodone 5 mg by Dr Lobito Watt)  6/14- 10 pills Percocet 5 Dr Liudmila Corral)   6/18- 20 pills Percocet 10 Dr Maryjo Watt)  6/25- 20 pills Oxycodone 10 Dr Lobito Watt)  It doesn't appear she has filled Dr Naomi Escobar prescription       Alicia Obrien, DO [facility]  2

## 2018-07-01 NOTE — TELEPHONE ENCOUNTER
Please call pharmacy to clarify this situation  I checked PDMP and she has not filled the script I wrote on 6/28  There shouldn't be an issue filling #40 unless its coming from her insurance and if this is the case she will need to contact urology to discuss other ways to manage her stent and kidney stone pain

## 2018-07-02 NOTE — TELEPHONE ENCOUNTER
Checked PDMP, patient did receive #20 Oxycodone on 6/30/18  Left her detailed voice message explaining ins obviously will only allow certain quantity  Did also tell her to contact urology for better management of pain

## 2018-07-03 ENCOUNTER — TELEPHONE (OUTPATIENT)
Dept: FAMILY MEDICINE CLINIC | Facility: CLINIC | Age: 33
End: 2018-07-03

## 2018-07-03 ENCOUNTER — OFFICE VISIT (OUTPATIENT)
Dept: FAMILY MEDICINE CLINIC | Facility: CLINIC | Age: 33
End: 2018-07-03
Payer: COMMERCIAL

## 2018-07-03 VITALS
SYSTOLIC BLOOD PRESSURE: 130 MMHG | WEIGHT: 269 LBS | DIASTOLIC BLOOD PRESSURE: 82 MMHG | HEART RATE: 80 BPM | BODY MASS INDEX: 44.82 KG/M2 | RESPIRATION RATE: 20 BRPM | HEIGHT: 65 IN | TEMPERATURE: 97.9 F

## 2018-07-03 DIAGNOSIS — L50.9 URTICARIA: Primary | ICD-10-CM

## 2018-07-03 DIAGNOSIS — R10.9 FLANK PAIN: ICD-10-CM

## 2018-07-03 PROCEDURE — 99214 OFFICE O/P EST MOD 30 MIN: CPT | Performed by: NURSE PRACTITIONER

## 2018-07-03 RX ORDER — OXYCODONE HYDROCHLORIDE 10 MG/1
10 TABLET ORAL 3 TIMES DAILY PRN
Qty: 20 TABLET | Refills: 0 | Status: SHIPPED | OUTPATIENT
Start: 2018-07-03 | End: 2018-07-10 | Stop reason: SDUPTHER

## 2018-07-03 RX ORDER — CETIRIZINE HYDROCHLORIDE 10 MG/1
10 TABLET, CHEWABLE ORAL DAILY
Qty: 90 TABLET | Refills: 0 | Status: SHIPPED | OUTPATIENT
Start: 2018-07-03 | End: 2018-07-06 | Stop reason: ALTCHOICE

## 2018-07-03 RX ORDER — FAMOTIDINE 20 MG/1
20 TABLET, FILM COATED ORAL 2 TIMES DAILY
Qty: 60 TABLET | Refills: 0 | Status: ON HOLD | OUTPATIENT
Start: 2018-07-03 | End: 2018-07-17 | Stop reason: ALTCHOICE

## 2018-07-03 NOTE — TELEPHONE ENCOUNTER
Please let patient know I sent another 20 pills to her pharmacy but changed the instructions to every 8 hours as needed severe pain, she needs to space out how often she is using this medication

## 2018-07-03 NOTE — PATIENT INSTRUCTIONS
Urticaria   AMBULATORY CARE:   Urticaria  is also called hives  Hives can change size and shape, and appear anywhere on your skin  They can be mild or severe and last from a few minutes to a few days  Hives may be a sign of a severe allergic reaction called anaphylaxis that needs immediate treatment  Urticaria that lasts longer than 6 weeks may be a chronic condition that needs long-term treatment  Call 911 for signs or symptoms of anaphylaxis,  such as trouble breathing, swelling in your mouth or throat, or wheezing  You may also have itching, a rash, or feel like you are going to faint  Seek care immediately if:   · Your heart is beating faster than it normally does  · You have cramping or severe pain in your abdomen  Contact your healthcare provider if:   · You have a fever  · Your skin still itches 24 hours after you take your medicine  · You still have hives after 7 days  · Your joints are painful and swollen  · You have questions or concerns about your condition or care  Steps to take for signs or symptoms of anaphylaxis:   · Immediately  give 1 shot of epinephrine only into the outer thigh muscle  · Leave the shot in place  as directed  Your healthcare provider may recommend you leave it in place for up to 10 seconds before you remove it  This helps make sure all of the epinephrine is delivered  · Call 911 and go to the emergency department,  even if the shot improved symptoms  Do not drive yourself  Bring the used epinephrine shot with you  Treatment for mild urticaria  may not be needed  Chronic urticaria may need to be treated with more than one medicine, or other medicines than listed below  The following are common medicines used to treat urticaria:  · Antihistamines  decrease mild symptoms such as itching or a rash  · Steroids  decrease redness, pain, and swelling  · Epinephrine  is used to treat severe allergic reactions such as anaphylaxis    Safety precautions to take if you are at risk for anaphylaxis:   · Keep 2 shots of epinephrine with you at all times  You may need a second shot, because epinephrine only works for about 20 minutes and symptoms may return  Your healthcare provider can show you and family members how to give the shot  Check the expiration date every month and replace it before it expires  · Create an action plan  Your healthcare provider can help you create a written plan that explains the allergy and an emergency plan to treat a reaction  The plan explains when to give a second epinephrine shot if symptoms return or do not improve after the first  Give copies of the action plan and emergency instructions to family members, work and school staff, and  providers  Show them how to give a shot of epinephrine  · Be careful when you exercise  If you have had exercise-induced anaphylaxis, do not exercise right after you eat  Stop exercising right away if you start to develop any signs or symptoms of anaphylaxis  You may first feel tired, warm, or have itchy skin  Hives, swelling, and severe breathing problems may develop if you continue to exercise  · Carry medical alert identification  Wear medical alert jewelry or carry a card that explains the allergy  Ask your healthcare provider where to get these items  · Keep a record of triggers and symptoms  Record everything you eat, drink, or apply to your skin for 3 weeks  Include stressful events and what you were doing right before your hives started  Bring the record with you to follow-up visits with your healthcare provider  Manage urticaria:   · Cool your skin  This may help decrease itching  Apply a cool pack to your hives  Dip a hand towel in cool water, wring it out, and place it on your hives  You may also soak your skin in a cool oatmeal bath  · Do not rub your hives  This can irritate your skin and cause more hives  · Wear loose clothing    Tight clothes may irritate your skin and cause more hives  · Manage stress  Stress may trigger hives, or make them worse  Learn new ways to relax, such as deep breathing  Follow up with your healthcare provider as directed:  Write down your questions so you remember to ask them during your visits  © 2017 [employer] Information is for End User's use only and may not be sold, redistributed or otherwise used for commercial purposes  All illustrations and images included in CareNotes® are the copyrighted property of [employer]  or [employer]  The above information is an  only  It is not intended as medical advice for individual conditions or treatments  Talk to your doctor, nurse or pharmacist before following any medical regimen to see if it is safe and effective for you

## 2018-07-03 NOTE — ASSESSMENT & PLAN NOTE
Recurrent after sunburn, then drug reaction  Add long acting H1 blocker, continue hydroxizine  d/c benadry-doesn't help and makes her sleepy  Resume H2 blocker  Avoid physical stimulus- sun/heat is trigger  Education- handout urticaria and reviewed in office  F/u prn and ED precautions discussed

## 2018-07-03 NOTE — PROGRESS NOTES
Patient seen by NP today  Insurance will only pay for #20 oxycodone for 5 day fill  Surgery is scheduled for July 17th  Was left v/m by nursing to contact urology for other methods of pain control as well  Patient will be out over the holiday, new Rx sent  PDMP checked

## 2018-07-03 NOTE — PROGRESS NOTES
Corina Jack 1985 female MRN: [id number]    Family Medicine Acute Visit    ASSESSMENT/PLAN  Problem List Items Addressed This Visit     Urticaria - Primary     Recurrent after sunburn, then drug reaction  Add long acting H1 blocker, continue hydroxizine  d/c benadry-doesn't help and makes her sleepy  Resume H2 blocker  Avoid physical stimulus- sun/heat is trigger  Education- handout urticaria and reviewed in office  F/u prn and ED precautions discussed         Relevant Medications    famotidine (PEPCID) 20 mg tablet    cetirizine (ZyrTEC) 10 MG chewable tablet                Future Appointments  Date Time Provider Teddy Dumont   7/6/2018 10:00 AM [facility] ROOM 2 [facility]   7/13/2018 11:00 AM [facility]          SUBJECTIVE  CC: Foot Swelling (when swelling happens leg and foot turn red ) and Leg Swelling      HPI:  Corina Jack is a 33 y o  female who presents for above  We reviewed the events of the past month regarding kidney stones, sunburn, drug reaction  She intemittenty gets hives on her extremities/trunk and her feet get red/splotchy and itchy and swollen  Resolves between episodes  Denies systemic and respiratory symptoms  Review of Systems   Skin:        See hpi   All other systems reviewed and are negative        Historical Information   The patient history was reviewed as follows:  Past Medical History:   Diagnosis Date    Anxiety     Gestational diabetes     Gestational diabetes     Hypertension     Obesity          Past Surgical History:   Procedure Laterality Date    OH CYSTO/URETERO W/LITHOTRIPSY &INDWELL STENT INSRT Right 6/12/2018    Procedure: CYSTOSCOPY URETEROSCOPY WITH LITHOTRIPSY HOLMIUM LASER, RETROGRADE PYELOGRAM AND INSERTION STENT URETERAL;  Surgeon: Genoveva Morris MD;  Location[facility];  Service: Urology    RENAL ARTERY STENT  2015     Family History   Problem Relation Age of Onset    Diabetes Mother    Jeff Ramsay Hypertension Mother     Muscular dystrophy Family       Social History   History   Alcohol Use    Yes     Comment: occasional     History   Drug Use No     History   Smoking Status    Current Every Day Smoker    Packs/day: 1 00    Types: Cigarettes   Smokeless Tobacco    Never Used       Medications:     Current Outpatient Prescriptions:     hydrOXYzine HCL (ATARAX) 25 mg tablet, Take 1 tablet (25 mg total) by mouth 2 (two) times a day as needed for itching or anxiety for up to 20 doses, Disp: 60 tablet, Rfl: 1    ibuprofen (MOTRIN) 800 mg tablet, Take 1 tablet (800 mg total) by mouth every 8 (eight) hours as needed for moderate pain, Disp: 90 tablet, Rfl: 0    oxyCODONE (ROXICODONE) 10 MG TABS, Take 1 tablet (10 mg total) by mouth every 6 (six) hours as needed for severe pain Max Daily Amount: 40 mg, Disp: 40 tablet, Rfl: 0    cetirizine (ZyrTEC) 10 MG chewable tablet, Chew 1 tablet (10 mg total) daily, Disp: 90 tablet, Rfl: 0    famotidine (PEPCID) 20 mg tablet, Take 1 tablet (20 mg total) by mouth 2 (two) times a day, Disp: 60 tablet, Rfl: 0    ondansetron (ZOFRAN ODT) 4 mg disintegrating tablet, Take 2 tablets (8 mg total) by mouth every 8 (eight) hours as needed for nausea or vomiting for up to 10 doses, Disp: 10 tablet, Rfl: 3    Allergies   Allergen Reactions    Ditropan [Oxybutynin] Hives    Flomax [Tamsulosin] Hives    Penicillins Hives       OBJECTIVE  Vitals:   Vitals:    07/03/18 1342   BP: 130/82   Pulse: 80   Resp: 20   Temp: 97 9 °F (36 6 °C)   Weight: 122 kg (269 lb)   Height: 5' 4 5" (1 638 m)         Physical Exam   Constitutional: She is oriented to person, place, and time  She appears well-developed  No distress  HENT:   Head: Normocephalic and atraumatic  Mouth/Throat: Oropharynx is clear and moist    Eyes: Conjunctivae are normal  Pupils are equal, round, and reactive to light  No scleral icterus  Neck: Neck supple  No JVD present  No thyromegaly present     Cardiovascular: Normal rate, regular rhythm, normal heart sounds and intact distal pulses  Pulmonary/Chest: Effort normal    Musculoskeletal: She exhibits no edema  Neurological: She is alert and oriented to person, place, and time  Skin: Skin is warm and dry  Has few  petechiae ankles and slight bruised appearance- likely r/t urticaria   Psychiatric: She has a normal mood and affect                Robert Bell, [address] [place] [address]   7/3/2018

## 2018-07-05 ENCOUNTER — ANESTHESIA EVENT (OUTPATIENT)
Dept: PERIOP | Facility: HOSPITAL | Age: 33
End: 2018-07-05
Payer: COMMERCIAL

## 2018-07-05 RX ORDER — SODIUM CHLORIDE 9 MG/ML
125 INJECTION, SOLUTION INTRAVENOUS CONTINUOUS
Status: CANCELLED | OUTPATIENT
Start: 2018-07-17 | End: 2019-07-17

## 2018-07-05 NOTE — TELEPHONE ENCOUNTER
Called pharmacy, gave verbal to change to Zyrtec tablet that is covered by insurance  They have no record or documentation suggesting patient's Oxycodone be changed  Called patient, left message advising there will be no change in her pain medication dosing    Forwarding for your information

## 2018-07-05 NOTE — TELEPHONE ENCOUNTER
Pharmacy suggested to patient for following meds direction changes:    1) Oxycodone should say take one tab every 6 hrs 4 times daily  For 7 days a count of 28 tabs    2) Zertec (chewable not covered by Ins , but will pay otherwise

## 2018-07-06 ENCOUNTER — APPOINTMENT (OUTPATIENT)
Dept: LAB | Facility: HOSPITAL | Age: 33
End: 2018-07-06
Attending: UROLOGY
Payer: COMMERCIAL

## 2018-07-06 ENCOUNTER — APPOINTMENT (OUTPATIENT)
Dept: PREADMISSION TESTING | Facility: HOSPITAL | Age: 33
End: 2018-07-06
Payer: COMMERCIAL

## 2018-07-06 DIAGNOSIS — N20.0 NEPHROLITHIASIS: ICD-10-CM

## 2018-07-06 PROCEDURE — 87086 URINE CULTURE/COLONY COUNT: CPT

## 2018-07-06 RX ORDER — SCOLOPAMINE TRANSDERMAL SYSTEM 1 MG/1
1 PATCH, EXTENDED RELEASE TRANSDERMAL ONCE
Status: CANCELLED | OUTPATIENT
Start: 2018-07-17 | End: 2018-07-17

## 2018-07-06 RX ORDER — ACETAMINOPHEN 325 MG/1
650 TABLET ORAL EVERY 6 HOURS PRN
COMMUNITY
End: 2018-07-25 | Stop reason: ALTCHOICE

## 2018-07-06 NOTE — PRE-PROCEDURE INSTRUCTIONS
Pre-Surgery Instructions:   Medication Instructions    acetaminophen (TYLENOL) 325 mg tablet Patient was instructed by Physician and understands   famotidine (PEPCID) 20 mg tablet Patient was instructed by Physician and understands   hydrOXYzine HCL (ATARAX) 25 mg tablet Patient was instructed by Physician and understands   ibuprofen (MOTRIN) 800 mg tablet Patient was instructed to contact Physician for medication instruction   ondansetron (ZOFRAN ODT) 4 mg disintegrating tablet Patient was instructed by Physician and understands   oxyCODONE (ROXICODONE) 10 MG TABS Patient was instructed by Physician and understands  Pt instructed to take her pepcid, atarax, and zyrtec with a sip of water the morning of surgery  Pt given [facility] preop instructions and reviewed with pt  Pt given Chlorhexidine but may use dial antibacterial soap due to recovery from a diffuse skin reaction to medicines from recent kidney stone event

## 2018-07-06 NOTE — ANESTHESIA PREPROCEDURE EVALUATION
Review of Systems/Medical History  Patient summary reviewed  Chart reviewed  History of anesthetic complications (  + motion sickness) PONV    Cardiovascular  Hypertension controlled,    Pulmonary  Smoker cigarette smoker  , Tobacco cessation counseling given ,        GI/Hepatic  Negative GI/hepatic ROS          Kidney stones,        Endo/Other  Diabetes gestational ,   Comment: Sunburn with blistering on chest and arms Obesity  morbid obesity   GYN  Negative gynecology ROS          Hematology  Negative hematology ROS      Musculoskeletal  Negative musculoskeletal ROS        Neurology  Negative neurology ROS      Psychology   Anxiety, Depression , being treated for depression,          pt said she doesn't know what her rxn to PCN is, she states that her mom said she had a rxn in her childhood but "doesn't think it was anything"    Physical Exam    Airway    Mallampati score: II  TM Distance: >3 FB  Neck ROM: full     Dental   No notable dental hx     Cardiovascular  Rhythm: regular, Rate: normal, Cardiovascular exam normal    Pulmonary  Pulmonary exam normal Breath sounds clear to auscultation,     Other Findings        Anesthesia Plan  ASA Score- 3 Emergent    Anesthesia Type- general with ASA Monitors  Additional Monitors:   Airway Plan: ETT  Comment:  scop patch ordered  Plan Factors-Patient not instructed to abstain from smoking on day of procedure  Patient did not smoke on day of surgery  Induction- intravenous  Postoperative Plan-     Informed Consent- Anesthetic plan and risks discussed with patient

## 2018-07-07 LAB — BACTERIA UR CULT: NORMAL

## 2018-07-09 ENCOUNTER — HOSPITAL ENCOUNTER (EMERGENCY)
Facility: HOSPITAL | Age: 33
Discharge: HOME | End: 2018-07-09
Attending: EMERGENCY MEDICINE
Payer: COMMERCIAL

## 2018-07-09 ENCOUNTER — TELEPHONE (OUTPATIENT)
Dept: UROLOGY | Facility: CLINIC | Age: 33
End: 2018-07-09

## 2018-07-09 ENCOUNTER — APPOINTMENT (EMERGENCY)
Dept: CT IMAGING | Facility: HOSPITAL | Age: 33
End: 2018-07-09
Payer: COMMERCIAL

## 2018-07-09 ENCOUNTER — TELEPHONE (OUTPATIENT)
Dept: FAMILY MEDICINE CLINIC | Facility: CLINIC | Age: 33
End: 2018-07-09

## 2018-07-09 VITALS
HEIGHT: 64 IN | DIASTOLIC BLOOD PRESSURE: 75 MMHG | SYSTOLIC BLOOD PRESSURE: 154 MMHG | WEIGHT: 265 LBS | RESPIRATION RATE: 18 BRPM | OXYGEN SATURATION: 100 % | BODY MASS INDEX: 45.24 KG/M2 | HEART RATE: 85 BPM | TEMPERATURE: 98.8 F

## 2018-07-09 DIAGNOSIS — R10.9 FLANK PAIN: ICD-10-CM

## 2018-07-09 DIAGNOSIS — N12 PYELONEPHRITIS: ICD-10-CM

## 2018-07-09 DIAGNOSIS — N20.0 NEPHROLITHIASIS: Primary | ICD-10-CM

## 2018-07-09 DIAGNOSIS — N20.1 URETEROLITHIASIS: ICD-10-CM

## 2018-07-09 DIAGNOSIS — R10.9 ACUTE RIGHT FLANK PAIN: Primary | ICD-10-CM

## 2018-07-09 LAB
ALBUMIN SERPL BCP-MCNC: 3.4 G/DL (ref 3.5–5)
ALP SERPL-CCNC: 92 U/L (ref 46–116)
ALT SERPL W P-5'-P-CCNC: 45 U/L (ref 12–78)
ANION GAP SERPL CALCULATED.3IONS-SCNC: 10 MMOL/L (ref 4–13)
AST SERPL W P-5'-P-CCNC: 22 U/L (ref 5–45)
BACTERIA UR QL AUTO: ABNORMAL /HPF
BASOPHILS # BLD AUTO: 0.02 THOUSANDS/ΜL (ref 0–0.1)
BASOPHILS NFR BLD AUTO: 0 % (ref 0–1)
BILIRUB SERPL-MCNC: 0.3 MG/DL (ref 0.2–1)
BILIRUB UR QL STRIP: NEGATIVE
BUN SERPL-MCNC: 9 MG/DL (ref 5–25)
CALCIUM SERPL-MCNC: 8.8 MG/DL (ref 8.3–10.1)
CHLORIDE SERPL-SCNC: 106 MMOL/L (ref 100–108)
CLARITY UR: ABNORMAL
CO2 SERPL-SCNC: 27 MMOL/L (ref 21–32)
COLOR UR: ABNORMAL
CREAT SERPL-MCNC: 0.82 MG/DL (ref 0.6–1.3)
EOSINOPHIL # BLD AUTO: 0.12 THOUSAND/ΜL (ref 0–0.61)
EOSINOPHIL NFR BLD AUTO: 3 % (ref 0–6)
ERYTHROCYTE [DISTWIDTH] IN BLOOD BY AUTOMATED COUNT: 16 % (ref 11.6–15.1)
EXT PREG TEST URINE: NEGATIVE
GFR SERPL CREATININE-BSD FRML MDRD: 96 ML/MIN/1.73SQ M
GLUCOSE SERPL-MCNC: 113 MG/DL (ref 65–140)
GLUCOSE UR STRIP-MCNC: NEGATIVE MG/DL
HCT VFR BLD AUTO: 38.2 % (ref 34.8–46.1)
HGB BLD-MCNC: 12.1 G/DL (ref 11.5–15.4)
HGB UR QL STRIP.AUTO: ABNORMAL
KETONES UR STRIP-MCNC: NEGATIVE MG/DL
LEUKOCYTE ESTERASE UR QL STRIP: ABNORMAL
LYMPHOCYTES # BLD AUTO: 0.71 THOUSANDS/ΜL (ref 0.6–4.47)
LYMPHOCYTES NFR BLD AUTO: 16 % (ref 14–44)
MCH RBC QN AUTO: 25.9 PG (ref 26.8–34.3)
MCHC RBC AUTO-ENTMCNC: 31.7 G/DL (ref 31.4–37.4)
MCV RBC AUTO: 82 FL (ref 82–98)
MONOCYTES # BLD AUTO: 0.42 THOUSAND/ΜL (ref 0.17–1.22)
MONOCYTES NFR BLD AUTO: 9 % (ref 4–12)
NEUTROPHILS # BLD AUTO: 3.27 THOUSANDS/ΜL (ref 1.85–7.62)
NEUTS SEG NFR BLD AUTO: 72 % (ref 43–75)
NITRITE UR QL STRIP: POSITIVE
NON-SQ EPI CELLS URNS QL MICRO: ABNORMAL /HPF
PH UR STRIP.AUTO: 8.5 [PH] (ref 4.5–8)
PLATELET # BLD AUTO: 294 THOUSANDS/UL (ref 149–390)
PMV BLD AUTO: 10.7 FL (ref 8.9–12.7)
POTASSIUM SERPL-SCNC: 3.4 MMOL/L (ref 3.5–5.3)
PROT SERPL-MCNC: 6.9 G/DL (ref 6.4–8.2)
PROT UR STRIP-MCNC: ABNORMAL MG/DL
RBC # BLD AUTO: 4.68 MILLION/UL (ref 3.81–5.12)
RBC #/AREA URNS AUTO: ABNORMAL /HPF
SODIUM SERPL-SCNC: 143 MMOL/L (ref 136–145)
SP GR UR STRIP.AUTO: 1.02 (ref 1–1.03)
UROBILINOGEN UR QL STRIP.AUTO: 1 E.U./DL
WBC # BLD AUTO: 4.54 THOUSAND/UL (ref 4.31–10.16)
WBC #/AREA URNS AUTO: ABNORMAL /HPF

## 2018-07-09 PROCEDURE — 81025 URINE PREGNANCY TEST: CPT | Performed by: EMERGENCY MEDICINE

## 2018-07-09 PROCEDURE — 87086 URINE CULTURE/COLONY COUNT: CPT

## 2018-07-09 PROCEDURE — 74176 CT ABD & PELVIS W/O CONTRAST: CPT

## 2018-07-09 PROCEDURE — 81001 URINALYSIS AUTO W/SCOPE: CPT

## 2018-07-09 PROCEDURE — 96375 TX/PRO/DX INJ NEW DRUG ADDON: CPT

## 2018-07-09 PROCEDURE — 80053 COMPREHEN METABOLIC PANEL: CPT | Performed by: EMERGENCY MEDICINE

## 2018-07-09 PROCEDURE — 96374 THER/PROPH/DIAG INJ IV PUSH: CPT

## 2018-07-09 PROCEDURE — 85025 COMPLETE CBC W/AUTO DIFF WBC: CPT | Performed by: EMERGENCY MEDICINE

## 2018-07-09 PROCEDURE — 99284 EMERGENCY DEPT VISIT MOD MDM: CPT

## 2018-07-09 PROCEDURE — 36415 COLL VENOUS BLD VENIPUNCTURE: CPT | Performed by: EMERGENCY MEDICINE

## 2018-07-09 PROCEDURE — 96361 HYDRATE IV INFUSION ADD-ON: CPT

## 2018-07-09 RX ORDER — CIPROFLOXACIN 500 MG/1
500 TABLET, FILM COATED ORAL EVERY 12 HOURS SCHEDULED
Qty: 14 TABLET | Refills: 0 | Status: ON HOLD | OUTPATIENT
Start: 2018-07-09 | End: 2018-07-17 | Stop reason: ALTCHOICE

## 2018-07-09 RX ORDER — IBUPROFEN 800 MG/1
800 TABLET ORAL EVERY 8 HOURS PRN
Qty: 21 TABLET | Refills: 0 | Status: ON HOLD | OUTPATIENT
Start: 2018-07-09 | End: 2018-07-17 | Stop reason: SDUPTHER

## 2018-07-09 RX ORDER — CIPROFLOXACIN 500 MG/1
500 TABLET, FILM COATED ORAL ONCE
Status: COMPLETED | OUTPATIENT
Start: 2018-07-09 | End: 2018-07-09

## 2018-07-09 RX ORDER — ONDANSETRON 2 MG/ML
4 INJECTION INTRAMUSCULAR; INTRAVENOUS ONCE
Status: COMPLETED | OUTPATIENT
Start: 2018-07-09 | End: 2018-07-09

## 2018-07-09 RX ORDER — KETOROLAC TROMETHAMINE 30 MG/ML
15 INJECTION, SOLUTION INTRAMUSCULAR; INTRAVENOUS ONCE
Status: COMPLETED | OUTPATIENT
Start: 2018-07-09 | End: 2018-07-09

## 2018-07-09 RX ORDER — OXYCODONE HYDROCHLORIDE 5 MG/1
5 TABLET ORAL EVERY 6 HOURS PRN
Qty: 20 TABLET | Refills: 0 | Status: SHIPPED | OUTPATIENT
Start: 2018-07-09 | End: 2018-07-10 | Stop reason: ALTCHOICE

## 2018-07-09 RX ADMIN — CIPROFLOXACIN 500 MG: 500 TABLET, FILM COATED ORAL at 17:19

## 2018-07-09 RX ADMIN — ONDANSETRON 4 MG: 2 INJECTION INTRAMUSCULAR; INTRAVENOUS at 15:46

## 2018-07-09 RX ADMIN — SODIUM CHLORIDE 1000 ML: 0.9 INJECTION, SOLUTION INTRAVENOUS at 15:15

## 2018-07-09 RX ADMIN — KETOROLAC TROMETHAMINE 15 MG: 30 INJECTION, SOLUTION INTRAMUSCULAR at 17:21

## 2018-07-09 RX ADMIN — HYDROMORPHONE HYDROCHLORIDE 1 MG: 1 INJECTION, SOLUTION INTRAMUSCULAR; INTRAVENOUS; SUBCUTANEOUS at 15:15

## 2018-07-09 NOTE — ED PROVIDER NOTES
History  Chief Complaint   Patient presents with    Flank Pain     pt c/o lower right sided flank pain, had stent placed in right kidney June 12th, pt states she has had issues with kidney since     80-year-old female presents to the emergency department for evaluation of severe right-sided flank pain  Patient states that she has kidney stones on the right side  She had a renal stent placed on June 12th  She states that she did have pain initially and it has been controlled with oxycodone however over the past 2 to 3 days pain has intensified and she has noticed recurrent hematuria  She denies fevers or chills  She has had nausea but no vomiting  She does have a appointment scheduled on Friday with her urologist   She had preop testing last week and is planned to have the stent removed on July 17th  Patient states she had an allergic reaction to medications Ditropan and Flomax and she is currently only taking Tylenol, ibuprofen, and oxycodone  Patient states that she has had hematuria intermittently and is also late to have her menstrual cycle  She did have some vaginal spotting last normal menstrual cycle was May 29  She had a negative pregnancy test last week        History provided by:  Patient and medical records   used: No    Flank Pain   Pain location:  R flank  Pain quality: sharp    Pain radiates to:  RUQ  Pain severity:  Severe  Onset quality:  Gradual  Duration:  2 days  Timing:  Constant  Progression:  Worsening  Chronicity:  Recurrent  Context: previous surgery    Context: not recent illness    Relieved by:  Nothing  Worsened by:  Nothing  Ineffective treatments:  Acetaminophen and NSAIDs  Associated symptoms: hematuria and nausea    Associated symptoms: no anorexia and no fever    Risk factors: not pregnant        Prior to Admission Medications   Prescriptions Last Dose Informant Patient Reported?  Taking?   acetaminophen (TYLENOL) 325 mg tablet  Self Yes No   Sig: Take 650 mg by mouth every 6 (six) hours as needed for mild pain   famotidine (PEPCID) 20 mg tablet  Self No No   Sig: Take 1 tablet (20 mg total) by mouth 2 (two) times a day   hydrOXYzine HCL (ATARAX) 25 mg tablet   No No   Sig: Take 1 tablet (25 mg total) by mouth 2 (two) times a day as needed for itching or anxiety for up to 20 doses   ibuprofen (MOTRIN) 800 mg tablet   No No   Sig: Take 1 tablet (800 mg total) by mouth every 8 (eight) hours as needed for moderate pain   ondansetron (ZOFRAN ODT) 4 mg disintegrating tablet   No No   Sig: Take 2 tablets (8 mg total) by mouth every 8 (eight) hours as needed for nausea or vomiting for up to 10 doses      Facility-Administered Medications: None       Past Medical History:   Diagnosis Date    Anesthesia complication     oxygen levels drop - always needs oxygen    Anxiety     Depression     Hypertension     Kidney stone     Motion sickness     Obesity     PONV (postoperative nausea and vomiting)     Scaly patch rash     UTI (urinary tract infection)     Wears glasses        Past Surgical History:   Procedure Laterality Date    CYSTOSCOPY      OR CYSTO/URETERO W/LITHOTRIPSY &INDWELL STENT INSRT Right 6/12/2018    Procedure: CYSTOSCOPY URETEROSCOPY WITH LITHOTRIPSY HOLMIUM LASER, RETROGRADE PYELOGRAM AND INSERTION STENT URETERAL;  Surgeon: Jazzmine Lynn MD;  Location[facility];  Service: Urology    RENAL ARTERY STENT  2015       Family History   Problem Relation Age of Onset    Diabetes Mother     Hypertension Mother     Muscular dystrophy Family      I have reviewed and agree with the history as documented  Social History   Substance Use Topics    Smoking status: Current Some Day Smoker     Packs/day: 0 25     Types: Cigarettes    Smokeless tobacco: Never Used    Alcohol use Yes      Comment: occasional        Review of Systems   Constitutional: Negative for fever  Gastrointestinal: Positive for nausea  Negative for anorexia     Genitourinary: Positive for flank pain and hematuria  Negative for pelvic pain  All other systems reviewed and are negative  Physical Exam  Physical Exam   Constitutional: She is oriented to person, place, and time  She appears well-developed and well-nourished  No distress  HENT:   Head: Normocephalic  Nose: Nose normal    Mouth/Throat: Oropharynx is clear and moist  No oropharyngeal exudate  Eyes: Conjunctivae and EOM are normal  Pupils are equal, round, and reactive to light  Neck: Normal range of motion  Neck supple  Cardiovascular: Normal rate, regular rhythm, normal heart sounds and intact distal pulses  Pulmonary/Chest: Effort normal and breath sounds normal    Abdominal: Soft  Normal appearance and bowel sounds are normal  She exhibits no distension  There is no tenderness  There is no rebound and no guarding  Musculoskeletal: Normal range of motion  She exhibits no edema or deformity  Thoracic back: She exhibits tenderness  Back:    Lymphadenopathy:     She has no cervical adenopathy  Neurological: She is alert and oriented to person, place, and time  She has normal strength and normal reflexes  No cranial nerve deficit or sensory deficit  She exhibits normal muscle tone  Coordination and gait normal    Skin: Skin is warm, dry and intact  No rash noted  Psychiatric: She has a normal mood and affect  Her behavior is normal  Judgment and thought content normal    Nursing note and vitals reviewed        Vital Signs  ED Triage Vitals [07/09/18 1401]   Temperature Pulse Respirations Blood Pressure SpO2   98 8 °F (37 1 °C) 86 18 135/71 98 %      Temp Source Heart Rate Source Patient Position - Orthostatic VS BP Location FiO2 (%)   Oral Monitor Sitting Right arm --      Pain Score       8           Vitals:    07/09/18 1401 07/09/18 1721   BP: 135/71 154/75   Pulse: 86 85   Patient Position - Orthostatic VS: Sitting Sitting       Visual Acuity      ED Medications  Medications   sodium chloride 0 9 % bolus 1,000 mL (0 mL Intravenous Stopped 7/9/18 1717)   HYDROmorphone (DILAUDID) injection 1 mg (1 mg Intravenous Given 7/9/18 1515)   ondansetron (ZOFRAN) injection 4 mg (4 mg Intravenous Given 7/9/18 1546)   ketorolac (TORADOL) injection 15 mg (15 mg Intravenous Given 7/9/18 1721)   ciprofloxacin (CIPRO) tablet 500 mg (500 mg Oral Given 7/9/18 1719)       Diagnostic Studies  Results Reviewed     Procedure Component Value Units Date/Time    Urine culture [[id number]] Collected:  07/09/18 1544    Lab Status:  Final result Specimen:  Urine from Urine, Clean Catch Updated:  07/10/18 1400     Urine Culture 80,000-89,000 cfu/ml     POCT pregnancy, urine [[id number]]  (Normal) Resulted:  07/09/18 1648    Lab Status:  Final result Updated:  07/09/18 1648     EXT PREG TEST UR (Ref: Negative) negative    Urine Microscopic [[id number]]  (Abnormal) Collected:  07/09/18 1544    Lab Status:  Final result Specimen:  Urine from Urine, Clean Catch Updated:  07/09/18 1610     RBC, UA Innumerable (A) /hpf      WBC, UA 10-20 (A) /hpf      Epithelial Cells Occasional /hpf      Bacteria, UA Occasional /hpf     Comprehensive metabolic panel [[id number]]  (Abnormal) Collected:  07/09/18 1515    Lab Status:  Final result Specimen:  Blood from Arm, Right Updated:  07/09/18 1609     Sodium 143 mmol/L      Potassium 3 4 (L) mmol/L      Chloride 106 mmol/L      CO2 27 mmol/L      Anion Gap 10 mmol/L      BUN 9 mg/dL      Creatinine 0 82 mg/dL      Glucose 113 mg/dL      Calcium 8 8 mg/dL      AST 22 U/L      ALT 45 U/L      Alkaline Phosphatase 92 U/L      Total Protein 6 9 g/dL      Albumin 3 4 (L) g/dL      Total Bilirubin 0 30 mg/dL      eGFR 96 ml/min/1 73sq m     Narrative:         National Kidney Disease Education Program recommendations are as follows:  GFR calculation is accurate only with a steady state creatinine  Chronic Kidney disease less than 60 ml/min/1 73 sq  meters  Kidney failure less than 15 ml/min/1 73 sq  meters  CBC and differential [[id number]]  (Abnormal) Collected:  07/09/18 1515    Lab Status:  Final result Specimen:  Blood from Arm, Right Updated:  07/09/18 1540     WBC 4 54 Thousand/uL      RBC 4 68 Million/uL      Hemoglobin 12 1 g/dL      Hematocrit 38 2 %      MCV 82 fL      MCH 25 9 (L) pg      MCHC 31 7 g/dL      RDW 16 0 (H) %      MPV 10 7 fL      Platelets 640 Thousands/uL      Neutrophils Relative 72 %      Lymphocytes Relative 16 %      Monocytes Relative 9 %      Eosinophils Relative 3 %      Basophils Relative 0 %      Neutrophils Absolute 3 27 Thousands/µL      Lymphocytes Absolute 0 71 Thousands/µL      Monocytes Absolute 0 42 Thousand/µL      Eosinophils Absolute 0 12 Thousand/µL      Basophils Absolute 0 02 Thousands/µL     ED Urine Macroscopic [[id number]]  (Abnormal) Collected:  07/09/18 1544    Lab Status:  Final result Specimen:  Urine Updated:  07/09/18 1537     Color, UA Orange     Clarity, UA Cloudy     pH, UA 8 5 (H)     Leukocytes, UA Moderate (A)     Nitrite, UA Positive (A)     Protein,  (2+) (A) mg/dl      Glucose, UA Negative mg/dl      Ketones, UA Negative mg/dl      Urobilinogen, UA 1 0 E U /dl      Bilirubin, UA Negative     Blood, UA Large (A)     Specific [place], UA 1 020    Narrative:       CLINITEK RESULT                 CT renal stone study abdomen pelvis without contrast   Final Result by Willy Guardado MD (07/09 1617)      Right nephroureteral stent remains in place  Cluster of mid/distal right ureteral calculi ranging in size between 3 mm and 5 mm  Best seen #601/93-98  Mild persistent right hydronephrosis  No perinephric collection            Workstation performed: [id number]                    Procedures  Procedures       Phone Contacts  ED Phone Contact    ED Course  ED Course as of Jul 12 1458 Mon Jul 09, 2018   1531 Discussed case with urology JOSEPH Cabrera, she recommends imaging due to new pain to ensure stent is working appropriately  - if CT ok pt   May have stent colic vs pyelo    7957 Reviewed case including blood work urinalysis and CT scan findings with urology JOSEPH Palm  The patient is afebrile, nontoxic and has a normal white blood cell count  She recommends the patient be discharged with oral antibiotics  She will have a test of urine prior to having her stent removed next Tuesday  She patient is to call the office tomorrow to arrange for that appointment  MDM  Number of Diagnoses or Management Options  Acute right flank pain: new and requires workup  Pyelonephritis: new and requires workup  Ureterolithiasis: new and requires workup     Amount and/or Complexity of Data Reviewed  Clinical lab tests: reviewed and ordered  Tests in the radiology section of CPT®: ordered and reviewed  Decide to obtain previous medical records or to obtain history from someone other than the patient: yes  Discuss the patient with other providers: yes  Independent visualization of images, tracings, or specimens: yes    Patient Progress  Patient progress: stable    CritCare Time    Disposition  Final diagnoses:   Acute right flank pain   Ureterolithiasis   Pyelonephritis     Time reflects when diagnosis was documented in both MDM as applicable and the Disposition within this note     Time User Action Codes Description Comment    7/9/2018  5:09 PM Karen Brooks Add [R10 9] Acute right flank pain     7/9/2018  5:09 PM Karen Brooks Add [N20 1] Ureterolithiasis     7/9/2018  5:09 PM Karen Brooks Add [N12] Pyelonephritis       ED Disposition     ED Disposition Condition Comment    Discharge  Kaye Donohue discharge to home/self care      Condition at discharge: Stable        Follow-up Information     Follow up With Specialties Details Why Lizette [facility]  52  For Urology [facility] Call in 1 day to arrange repeat urinalysis [address] [facility]  [phone]          Discharge Medication List as of 7/9/2018 5:13 PM      START taking these medications    Details   ciprofloxacin (CIPRO) 500 mg tablet Take 1 tablet (500 mg total) by mouth every 12 (twelve) hours for 7 days, Starting Mon 7/9/2018, Until Mon 7/16/2018, Normal      !! ibuprofen (MOTRIN) 800 mg tablet Take 1 tablet (800 mg total) by mouth every 8 (eight) hours as needed for mild pain or moderate pain for up to 10 days, Starting Mon 7/9/2018, Until Thu 7/19/2018, Normal      !! oxyCODONE (ROXICODONE) 5 mg immediate release tablet Take 1 tablet (5 mg total) by mouth every 6 (six) hours as needed for moderate pain for up to 10 days Max Daily Amount: 20 mg, Starting Mon 7/9/2018, Until Thu 7/19/2018, Print       !! - Potential duplicate medications found  Please discuss with provider  CONTINUE these medications which have NOT CHANGED    Details   acetaminophen (TYLENOL) 325 mg tablet Take 650 mg by mouth every 6 (six) hours as needed for mild pain, Historical Med      famotidine (PEPCID) 20 mg tablet Take 1 tablet (20 mg total) by mouth 2 (two) times a day, Starting Tue 7/3/2018, Normal      hydrOXYzine HCL (ATARAX) 25 mg tablet Take 1 tablet (25 mg total) by mouth 2 (two) times a day as needed for itching or anxiety for up to 20 doses, Starting Thu 6/28/2018, Print      !! ibuprofen (MOTRIN) 800 mg tablet Take 1 tablet (800 mg total) by mouth every 8 (eight) hours as needed for moderate pain, Starting Thu 6/28/2018, Normal      ondansetron (ZOFRAN ODT) 4 mg disintegrating tablet Take 2 tablets (8 mg total) by mouth every 8 (eight) hours as needed for nausea or vomiting for up to 10 doses, Starting Mon 6/25/2018, Print      !! oxyCODONE (ROXICODONE) 10 MG TABS Take 1 tablet (10 mg total) by mouth 3 (three) times a day as needed for severe pain Max Daily Amount: 30 mg, Starting Tue 7/3/2018, Normal       !! - Potential duplicate medications found  Please discuss with provider  No discharge procedures on file      ED Provider  Electronically Signed by           Diann Miles DO  07/12/18 1452

## 2018-07-09 NOTE — DISCHARGE INSTRUCTIONS
Kidney Infection   AMBULATORY CARE:   A kidney infection , or pyelonephritis, is a bacterial infection  The infection usually starts in your bladder or urethra and moves into your kidney  One or both kidneys may be infected  Common signs and symptoms include the following:   · Pain in your abdomen, lower back, or sides    · Pain or burning when you urinate    · A sudden strong urge to urinate or urinating more often than usual    · Cloudy or bloody urine    · Fever, chills, and fatigue    · Nausea and vomiting  Seek care immediately if:   · You have a fever and chills  · You cannot stop vomiting  · You have severe pain in your abdomen, lower back, or sides  Contact your healthcare provider if:   · You continue to have a fever after you take antibiotics for 3 days  · You have pain when you urinate, even after treatment  · Your signs and symptoms return  · You have questions or concerns about your condition or care  Treatment:   · Antibiotics  treat your bacterial infection  · Acetaminophen  decreases pain and fever  It is available without a doctor's order  Ask how much to take and how often to take it  Follow directions  Read the labels of all other medicines you are using to see if they also contain acetaminophen, or ask your doctor or pharmacist  Acetaminophen can cause liver damage if not taken correctly  Do not use more than 4 grams (4,000 milligrams) total of acetaminophen in one day  · NSAIDs , such as ibuprofen, help decrease swelling, pain, and fever  This medicine is available with or without a doctor's order  NSAIDs can cause stomach bleeding or kidney problems in certain people  If you take blood thinner medicine, always ask if NSAIDs are safe for you  Always read the medicine label and follow directions  Do not give these medicines to children under 10months of age without direction from your child's healthcare provider  · Prescription pain medicine  may be given   Ask how to take this medicine safely  · Surgery  may be needed if a ureter is blocked  The ureter is the tube that takes urine from a kidney to the bladder  A blocked ureter can cause repeated kidney infections  Drink liquids as directed: You may need to drink extra liquids to help flush your kidneys and urinary system  Water is the best liquid to drink  Ask your healthcare provider how much liquid to drink each day and which liquids are best for you  Urinate as soon as you feel the urge: This will help flush bacteria from your urinary system  Do not wait or hold your urine for too long  Clean your genital area every day with soap and water:  Wipe from front to back after you urinate or have a bowel movement  Wear cotton underwear  Fabrics such as nylon and polyester can stay damp  This can increase your risk for infection  Urinate within 15 minutes after you have sex  Follow up with your healthcare provider as directed:  Write down your questions so you remember to ask them during your visits  © 2017 [employer] Information is for End User's use only and may not be sold, redistributed or otherwise used for commercial purposes  All illustrations and images included in CareNotes® are the copyrighted property of [employer]  or Francis Hernandez  The above information is an  only  It is not intended as medical advice for individual conditions or treatments  Talk to your doctor, nurse or pharmacist before following any medical regimen to see if it is safe and effective for you  Ureteral Stones   WHAT YOU NEED TO KNOW:   A ureteral stone is a stone that forms in the kidney and moves down the ureter and gets stuck there  The ureter is the tube that takes urine from the kidney to the bladder  Stones can form in the urinary system when your urine has high levels of minerals and salts  Urinary stones can be made of uric acid, calcium, phosphate, or oxalate crystals  DISCHARGE INSTRUCTIONS:   Seek care immediately if:   · You have severe pain that does not improve, even after you take medicine  · You have vomiting that is not relieved by medicine  Contact your healthcare provider if:   · You develop a fever  · You have any questions or concerns about your condition or care  Follow up with your healthcare provider as directed: You may need to return for more tests  Write down your questions so you remember to ask them during your visits  Medicines: You may need any of the following:  · NSAIDs , such as ibuprofen, help decrease swelling, pain, and fever  This medicine is available with or without a doctor's order  NSAIDs can cause stomach bleeding or kidney problems in certain people  If you take blood thinner medicine, always ask your healthcare provider if NSAIDs are safe for you  Always read the medicine label and follow directions  · Prescription pain medicine  may help decrease pain or help your ureteral stone pass  Do not wait until the pain is severe before you take pain medicine  · Nausea medicine  may help calm your stomach and prevent vomiting  · Take your medicine as directed  Contact your healthcare provider if you think your medicine is not helping or if you have side effects  Tell him or her if you are allergic to any medicine  Keep a list of the medicines, vitamins, and herbs you take  Include the amounts, and when and why you take them  Bring the list or the pill bottles to follow-up visits  Carry your medicine list with you in case of an emergency  Self-care:   · Drink plenty of liquids  Your healthcare provider may tell you to drink at least 8 to 12 (eight-ounce) cups of liquids each day  This helps flush out the ureteral stones when you urinate  Water is the best liquid to drink  · Strain your urine every time you go to the bathroom  Urinate through a strainer or a piece of thin cloth to catch the stones   Take the stones to your healthcare provider so they can be sent to the lab for tests  This will help your healthcare providers plan the best treatment for you  · Ask your healthcare provider about any nutrition changes you need to make  You may need to limit certain foods such as foods high in sodium (salt), certain protein foods, or foods high in oxalate  After you pass your ureteral stone: Once you have passed your ureteral stone, you may need to do a 24-hour urine test  You may need to save all of your urine for 24 hours  Each time you go to the bathroom, you will urinate into a container  Then you will pour your urine into a larger container that is kept cold  You may be told to write down the time and amount of urine you passed  At the end of 24 hours, the urine is sent to a lab for tests  Results from the test will help your healthcare provider plan ways to prevent more stones from forming  © 2017 [facility] Information is for End User's use only and may not be sold, redistributed or otherwise used for commercial purposes  All illustrations and images included in CareNotes® are the copyrighted property of [employer]  or Francis Hernandez  The above information is an  only  It is not intended as medical advice for individual conditions or treatments  Talk to your doctor, nurse or pharmacist before following any medical regimen to see if it is safe and effective for you

## 2018-07-09 NOTE — TELEPHONE ENCOUNTER
PATIENT WAS IN ER TODAY 07/09/18 AND WAS PRESCRIBED OXYCODONE 5MG BUT PATIENT REFUSE SCRIPT  WOULD LIKE DR VALDEZ TO PRESCRIBED OXYCODONE 10MG  3 TIMES A DAY   PATIENT IS AWARE THAT SHE DOES PAY FOR MEDICATION

## 2018-07-09 NOTE — TELEPHONE ENCOUNTER
Patient with ureteral stent in place  Has upcoming appointment with our CRNP 7/13/18 with definitive URS with Dr Hamilton Paul next week  In ER with pain  Had reaction to tamsulosin/ditropan, so has since stopped taking  She is afebrile, WBC 4 54, Cr 0 82  CT shows mild hydro with stent in good position and ureteral stones adjacent to stent  Urine leukocyte and nitrite positive in ER  Recommend initiation on cipro to cover to urinary infection  She will need a repeat urine sample on her 7/13/18 appointment  Pain control with fluids, ibuprofen, heating pad, and as needed norco for break through pain (since patient had allergic reaction to either tamsulosin or ditropan)

## 2018-07-10 LAB — BACTERIA UR CULT: NORMAL

## 2018-07-10 RX ORDER — OXYCODONE HYDROCHLORIDE 10 MG/1
10 TABLET ORAL 3 TIMES DAILY PRN
Qty: 21 TABLET | Refills: 0 | Status: SHIPPED | OUTPATIENT
Start: 2018-07-10 | End: 2018-07-13 | Stop reason: SDUPTHER

## 2018-07-10 NOTE — TELEPHONE ENCOUNTER
She has her procedure with urology on 7/17  1 week of oxycodone will be sent to the pharmacy  Further scripts/pain management needs to be discussed with urology as it is coming from the issue they are managing  Thank you

## 2018-07-10 NOTE — PROGRESS NOTES
7/13/2018  Kaye Morrison  1985  [id number]      Assessment  -Nephrolithiasis s/p ureteroscopy and RIGHT ureteral stent 6/12/18    Discussion  Kaye is a 33 y o  female being managed by Dr Radha Grove  Patient is scheduled for upcoming ureteroscopy and insertion of right ureteral stent on 07/17/2018 with Dr Sharon Hampton  We reviewed the risks and benefits of this procedure including but not limited to cardiopulmonary complications, bleeding, infection, damage to nearby structures such as bladder/ureter/kidney, need for nephrostomy tube, need for additional surgeries  Refill for only 5 tablets of Oxycodone were sent to patient's pharmacy, as she is unable to take Tramadol or Ditropan, and Ibuprofen/NSAID not helping with stent discomfort  Patient verbalized understanding  Consent will be obtained on the day of procedure by performing surgeon  All questions were answered  History of Present Illness  33 y o  female presents today to discuss her upcoming surgery  Patient recently evaluated at [facility] for 5 millimeter right obstructing ureteral calculus  Ureteral stent was placed on 06/12/2018 she was found to have UTI  She has since completed course of Cipro, and had stopped tamsulosin and ditropan due to allergy sensitivity  Patient re-presented to ER on 7/9/18 for persistent stent colic, dysuria, and urinary frequency and urgency  CT scan performed, which showed right ureteral stent in correct placement as well as known right ureteral calculi with mild hydronephrosis  She states that she continues to require Oxycodone for severe flank pain, which was refilled at ER visit  Patient reports that she is unable to take Tramadol, Percocet, or Vicodin due to allergy, and developed generalized rash and hives with Ditropan  She denies any episodes of gross hematuria, no fever, chills, nausea, or vomiting  Patient does report having missed menstrual cycle    Recent pregnancy testing was negative, she has been intermittently spotting  Review of Systems  Review of Systems   Constitutional: Negative  HENT: Negative  Respiratory: Negative  Cardiovascular: Negative  Gastrointestinal: Negative  Genitourinary: Positive for dysuria, flank pain (right sided), frequency and urgency  Negative for decreased urine volume, difficulty urinating and hematuria  Skin: Negative  Neurological: Negative  Psychiatric/Behavioral: Negative  Past Medical History  Past Medical History:   Diagnosis Date    Anesthesia complication     oxygen levels drop - always needs oxygen    Anxiety     Depression     Hypertension     Kidney stone     Motion sickness     Obesity     PONV (postoperative nausea and vomiting)     Scaly patch rash     UTI (urinary tract infection)     Wears glasses        Surgical History  Past Surgical History:   Procedure Laterality Date    CYSTOSCOPY      [place] CYSTO/URETERO W/LITHOTRIPSY &INDWELL STENT INSRT Right 6/12/2018    Procedure: CYSTOSCOPY URETEROSCOPY WITH LITHOTRIPSY HOLMIUM LASER, RETROGRADE PYELOGRAM AND INSERTION STENT URETERAL;  Surgeon: Mary Ortiz MD;  [facility];  Service: Urology    RENAL ARTERY STENT  2015       Family History  Family History   Problem Relation Age of Onset    Diabetes Mother     Hypertension Mother     Muscular dystrophy Family        Social History  Social History     Social History    Marital status: Single     Spouse name: N/A    Number of children: N/A    Years of education: N/A     Occupational History    Not on file       Social History Main Topics    Smoking status: Current Some Day Smoker     Packs/day: 0 25     Types: Cigarettes    Smokeless tobacco: Never Used    Alcohol use Yes      Comment: occasional    Drug use: No    Sexual activity: Not on file     Other Topics Concern    Not on file     Social History Narrative    No narrative on file       Current Medications  Current Outpatient Prescriptions   Medication Sig Dispense Refill    acetaminophen (TYLENOL) 325 mg tablet Take 650 mg by mouth every 6 (six) hours as needed for mild pain      ciprofloxacin (CIPRO) 500 mg tablet Take 1 tablet (500 mg total) by mouth every 12 (twelve) hours for 7 days 14 tablet 0    famotidine (PEPCID) 20 mg tablet Take 1 tablet (20 mg total) by mouth 2 (two) times a day 60 tablet 0    hydrOXYzine HCL (ATARAX) 25 mg tablet Take 1 tablet (25 mg total) by mouth 2 (two) times a day as needed for itching or anxiety for up to 20 doses 60 tablet 1    ibuprofen (MOTRIN) 800 mg tablet Take 1 tablet (800 mg total) by mouth every 8 (eight) hours as needed for moderate pain 90 tablet 0    ibuprofen (MOTRIN) 800 mg tablet Take 1 tablet (800 mg total) by mouth every 8 (eight) hours as needed for mild pain or moderate pain for up to 10 days 21 tablet 0    ondansetron (ZOFRAN ODT) 4 mg disintegrating tablet Take 2 tablets (8 mg total) by mouth every 8 (eight) hours as needed for nausea or vomiting for up to 10 doses 10 tablet 3    oxyCODONE (ROXICODONE) 10 MG TABS Take 1 tablet (10 mg total) by mouth 3 (three) times a day as needed for severe pain Max Daily Amount: 30 mg 21 tablet 0     No current facility-administered medications for this visit  Allergies  Allergies   Allergen Reactions    Ditropan [Oxybutynin] Hives    Flomax [Tamsulosin] Hives    Macrobid [Nitrofurantoin] Hives    Penicillins Hives       Vitals  There were no vitals filed for this visit  Physical Exam  Physical Exam   Constitutional: She is oriented to person, place, and time  She appears well-developed and well-nourished  HENT:   Head: Normocephalic  Eyes: Pupils are equal, round, and reactive to light  Neck: Normal range of motion  Cardiovascular: Normal rate, regular rhythm, normal heart sounds and intact distal pulses  Pulmonary/Chest: Effort normal and breath sounds normal    Abdominal: Soft  Normal appearance   There is no CVA tenderness  Musculoskeletal: Normal range of motion  Neurological: She is alert and oriented to person, place, and time  She has normal reflexes  Skin: Skin is warm and dry  Psychiatric: She has a normal mood and affect   Her behavior is normal  Judgment and thought content normal

## 2018-07-13 ENCOUNTER — TELEPHONE (OUTPATIENT)
Dept: UROLOGY | Facility: AMBULATORY SURGERY CENTER | Age: 33
End: 2018-07-13

## 2018-07-13 ENCOUNTER — TELEPHONE (OUTPATIENT)
Dept: UROLOGY | Facility: CLINIC | Age: 33
End: 2018-07-13

## 2018-07-13 ENCOUNTER — OFFICE VISIT (OUTPATIENT)
Dept: UROLOGY | Facility: AMBULATORY SURGERY CENTER | Age: 33
End: 2018-07-13
Payer: COMMERCIAL

## 2018-07-13 VITALS
BODY MASS INDEX: 45.11 KG/M2 | WEIGHT: 264.2 LBS | HEART RATE: 88 BPM | HEIGHT: 64 IN | DIASTOLIC BLOOD PRESSURE: 70 MMHG | SYSTOLIC BLOOD PRESSURE: 108 MMHG

## 2018-07-13 DIAGNOSIS — R10.9 FLANK PAIN: ICD-10-CM

## 2018-07-13 DIAGNOSIS — N20.0 NEPHROLITHIASIS: Primary | ICD-10-CM

## 2018-07-13 PROCEDURE — 99213 OFFICE O/P EST LOW 20 MIN: CPT | Performed by: NURSE PRACTITIONER

## 2018-07-13 RX ORDER — OXYCODONE HYDROCHLORIDE 10 MG/1
10 TABLET ORAL 3 TIMES DAILY PRN
Qty: 5 TABLET | Refills: 0 | Status: SHIPPED | OUTPATIENT
Start: 2018-07-13 | End: 2018-07-13 | Stop reason: ALTCHOICE

## 2018-07-13 RX ORDER — CETIRIZINE HYDROCHLORIDE 10 MG/1
10 TABLET ORAL DAILY
COMMUNITY
Start: 2018-07-05 | End: 2018-10-01 | Stop reason: ALTCHOICE

## 2018-07-13 NOTE — TELEPHONE ENCOUNTER
Notified by patient's pharmacy that patient was unable to fill 5 tablet refill for oxycodone, as she recently had 20 tablets filled on 7/11/18 prescribed by ER  Prescription cancelled in [facility] and notified pharmacy  If patient continues to have stent colic, would advise Tylenol or Ibuprofen, given patient's report of intolerance to other narcotics including Tramadol,  and anticholinergics  Patient is scheduled for upcoming surgery on 7/17/18

## 2018-07-13 NOTE — TELEPHONE ENCOUNTER
Pt s/p right ureteroscopic stone extraction, right stent insertion without string by Dr Toñito Alexandre 06/12/2018  Pt scheduled for surgery 07/17/2018 with Dr Dee Carmen, ureteroscopy and stent placement  Pt seen in office today by Brandon Villa NP for H&P  Brett prescribed Oxycodone 10mg #5  Pt left message on RN voicemail that prescription needs to be written for #20 so insurance will cover medicine  Phone call made to [facility] [phone] and spoke with pharmacist re pain medicine  Pharmacist informed office pt picked up #21 Oxycodone 10 mg on 7/10/2018 and then #20 Oxycodone 5mg on 7/11/2018  Since  pt obtained oxycodone on 07/10/2018, insurance did not approve most recent prescription written by [facility]  Brandon Villa NP aware of situation  Per [facility], cannot send anymore narcotics  Left message for pt, office will not prescribe any more pain medicine

## 2018-07-13 NOTE — PATIENT INSTRUCTIONS
Ureteroscopy   WHAT YOU NEED TO KNOW:   A ureteroscopy is a procedure to examine in the inside of your urinary tract, which includes your urethra, bladder, ureters, and kidneys  A ureteroscope is a small, thin tube with a light and camera on the end  Ureteroscopy can help your healthcare provider diagnose and treat problems in your urinary tract, such as kidney stones  DISCHARGE INSTRUCTIONS:   Medicine:   · Antibiotics  may be given to treat or prevent an infection  · Take your medicine as directed  Contact your healthcare provider if you think your medicine is not helping or if you have side effects  Tell him or her if you are allergic to any medicine  Keep a list of the medicines, vitamins, and herbs you take  Include the amounts, and when and why you take them  Bring the list or the pill bottles to follow-up visits  Carry your medicine list with you in case of an emergency  Follow up with your healthcare provider as directed:  Write down your questions so you remember to ask them during your visits  Drink liquids as directed  Liquids can help prevent kidney stones and urinary tract infections  Drink water and limit the amount of caffeine you drink  Caffeine may be found in coffee, tea, soda, sports drinks, and foods  Ask your healthcare provider how much liquid to drink each day  Contact your healthcare provider if:   · You have a fever  · You cannot urinate  · You have blood in your urine  · You are vomiting  · You have pain in your abdomen or side  · You have questions or concerns about your condition or care  © 2017 [address] Information is for End User's use only and may not be sold, redistributed or otherwise used for commercial purposes  All illustrations and images included in CareNotes® are the copyrighted property of A D [employer] M , [employer]  or [employer]  The above information is an  only   It is not intended as medical advice for individual conditions or treatments  Talk to your doctor, nurse or pharmacist before following any medical regimen to see if it is safe and effective for you

## 2018-07-13 NOTE — LETTER
July 13, 2018     Patient: Isaías Kim   YOB: 1985   Date of Visit: 7/13/2018       To Whom it May Concern:    Kaye Wolff is under my professional care  She was seen in my office on 7/13/2018 to discuss upcoming surgery  If you have any questions or concerns, please don't hesitate to call           Sincerely,          SYLVESTER King        CC: Kaye Wolff

## 2018-07-15 ENCOUNTER — HOSPITAL ENCOUNTER (EMERGENCY)
Facility: HOSPITAL | Age: 33
Discharge: HOME | End: 2018-07-15
Attending: EMERGENCY MEDICINE
Payer: COMMERCIAL

## 2018-07-15 VITALS
RESPIRATION RATE: 18 BRPM | SYSTOLIC BLOOD PRESSURE: 120 MMHG | HEART RATE: 86 BPM | TEMPERATURE: 99.3 F | HEIGHT: 64 IN | WEIGHT: 264.55 LBS | BODY MASS INDEX: 45.17 KG/M2 | OXYGEN SATURATION: 100 % | DIASTOLIC BLOOD PRESSURE: 77 MMHG

## 2018-07-15 DIAGNOSIS — R10.9 FLANK PAIN: Primary | ICD-10-CM

## 2018-07-15 LAB
ANION GAP SERPL CALCULATED.3IONS-SCNC: 9 MMOL/L (ref 4–13)
BACTERIA UR QL AUTO: ABNORMAL /HPF
BASOPHILS # BLD AUTO: 0.02 THOUSANDS/ΜL (ref 0–0.1)
BASOPHILS NFR BLD AUTO: 0 % (ref 0–1)
BILIRUB UR QL STRIP: NEGATIVE
BUN SERPL-MCNC: 15 MG/DL (ref 5–25)
CALCIUM SERPL-MCNC: 8.8 MG/DL (ref 8.3–10.1)
CHLORIDE SERPL-SCNC: 107 MMOL/L (ref 100–108)
CLARITY UR: ABNORMAL
CO2 SERPL-SCNC: 24 MMOL/L (ref 21–32)
COLOR UR: ABNORMAL
CREAT SERPL-MCNC: 0.87 MG/DL (ref 0.6–1.3)
EOSINOPHIL # BLD AUTO: 0.3 THOUSAND/ΜL (ref 0–0.61)
EOSINOPHIL NFR BLD AUTO: 3 % (ref 0–6)
ERYTHROCYTE [DISTWIDTH] IN BLOOD BY AUTOMATED COUNT: 15.7 % (ref 11.6–15.1)
GFR SERPL CREATININE-BSD FRML MDRD: 89 ML/MIN/1.73SQ M
GLUCOSE SERPL-MCNC: 105 MG/DL (ref 65–140)
GLUCOSE UR STRIP-MCNC: NEGATIVE MG/DL
HCT VFR BLD AUTO: 38.4 % (ref 34.8–46.1)
HGB BLD-MCNC: 12.1 G/DL (ref 11.5–15.4)
HGB UR QL STRIP.AUTO: ABNORMAL
KETONES UR STRIP-MCNC: NEGATIVE MG/DL
LEUKOCYTE ESTERASE UR QL STRIP: ABNORMAL
LYMPHOCYTES # BLD AUTO: 2.17 THOUSANDS/ΜL (ref 0.6–4.47)
LYMPHOCYTES NFR BLD AUTO: 23 % (ref 14–44)
MCH RBC QN AUTO: 25.4 PG (ref 26.8–34.3)
MCHC RBC AUTO-ENTMCNC: 31.5 G/DL (ref 31.4–37.4)
MCV RBC AUTO: 81 FL (ref 82–98)
MONOCYTES # BLD AUTO: 0.68 THOUSAND/ΜL (ref 0.17–1.22)
MONOCYTES NFR BLD AUTO: 7 % (ref 4–12)
MUCOUS THREADS UR QL AUTO: ABNORMAL
NEUTROPHILS # BLD AUTO: 6.21 THOUSANDS/ΜL (ref 1.85–7.62)
NEUTS SEG NFR BLD AUTO: 66 % (ref 43–75)
NITRITE UR QL STRIP: NEGATIVE
NON-SQ EPI CELLS URNS QL MICRO: ABNORMAL /HPF
OTHER STN SPEC: ABNORMAL
PH UR STRIP.AUTO: 5.5 [PH] (ref 4.5–8)
PLATELET # BLD AUTO: 305 THOUSANDS/UL (ref 149–390)
PMV BLD AUTO: 10.4 FL (ref 8.9–12.7)
POTASSIUM SERPL-SCNC: 3.7 MMOL/L (ref 3.5–5.3)
PROT UR STRIP-MCNC: >=300 MG/DL
RBC # BLD AUTO: 4.76 MILLION/UL (ref 3.81–5.12)
RBC #/AREA URNS AUTO: ABNORMAL /HPF
SODIUM SERPL-SCNC: 140 MMOL/L (ref 136–145)
SP GR UR STRIP.AUTO: >=1.03 (ref 1–1.03)
UROBILINOGEN UR QL STRIP.AUTO: 0.2 E.U./DL
WBC # BLD AUTO: 9.38 THOUSAND/UL (ref 4.31–10.16)
WBC #/AREA URNS AUTO: ABNORMAL /HPF

## 2018-07-15 PROCEDURE — 80048 BASIC METABOLIC PNL TOTAL CA: CPT | Performed by: EMERGENCY MEDICINE

## 2018-07-15 PROCEDURE — 96374 THER/PROPH/DIAG INJ IV PUSH: CPT

## 2018-07-15 PROCEDURE — 96375 TX/PRO/DX INJ NEW DRUG ADDON: CPT

## 2018-07-15 PROCEDURE — 81001 URINALYSIS AUTO W/SCOPE: CPT

## 2018-07-15 PROCEDURE — 36415 COLL VENOUS BLD VENIPUNCTURE: CPT | Performed by: EMERGENCY MEDICINE

## 2018-07-15 PROCEDURE — 99284 EMERGENCY DEPT VISIT MOD MDM: CPT

## 2018-07-15 PROCEDURE — 85025 COMPLETE CBC W/AUTO DIFF WBC: CPT | Performed by: EMERGENCY MEDICINE

## 2018-07-15 RX ORDER — MORPHINE SULFATE 15 MG/1
15 TABLET ORAL EVERY 4 HOURS PRN
Qty: 15 TABLET | Refills: 0 | Status: SHIPPED | OUTPATIENT
Start: 2018-07-15 | End: 2018-07-15

## 2018-07-15 RX ORDER — ONDANSETRON 2 MG/ML
4 INJECTION INTRAMUSCULAR; INTRAVENOUS ONCE
Status: COMPLETED | OUTPATIENT
Start: 2018-07-15 | End: 2018-07-15

## 2018-07-15 RX ORDER — OXYCODONE HYDROCHLORIDE AND ACETAMINOPHEN 5; 325 MG/1; MG/1
1 TABLET ORAL EVERY 4 HOURS PRN
Qty: 15 TABLET | Refills: 0 | Status: SHIPPED | OUTPATIENT
Start: 2018-07-15 | End: 2018-07-25 | Stop reason: SDUPTHER

## 2018-07-15 RX ADMIN — HYDROMORPHONE HYDROCHLORIDE 1 MG: 1 INJECTION, SOLUTION INTRAMUSCULAR; INTRAVENOUS; SUBCUTANEOUS at 20:56

## 2018-07-15 RX ADMIN — ONDANSETRON 4 MG: 2 INJECTION INTRAMUSCULAR; INTRAVENOUS at 20:56

## 2018-07-16 NOTE — DISCHARGE INSTRUCTIONS
Flank Pain   AMBULATORY CARE:   Flank pain  is felt in the area below your ribcage and above your hip bones, often in the lower back  Your pain may be dull or so severe that you cannot get comfortable  The pain may stay in one area or radiate to another area  It may worsen and lighten in waves  Flank pain is often a sign of problems with your urinary tract, such as a kidney stone or infection  Seek care immediately if:   · You have a fever  · Your heart is fluttering or jumping  · You see blood in your urine  · Your pain radiates into your lower abdomen and genital area  · You have intense pain in your low back next to your spine  · You are much more tired than usual and have no desire to eat  · You have a headache and your muscles jerk  Contact your healthcare provider if:   · You have an upset stomach and are vomiting  · You have to urinate more often, and with urgency  · Your pain worsens or does not improve, and you cannot get comfortable  · You pass a stone when you urinate  · You have questions or concerns about your condition or care  Treatment for flank pain  may include medicine to decrease pain or treat a bacterial infection  Follow up with your healthcare provider as directed:  Write down your questions so you remember to ask them during your visits  © 2017 [address] Information is for End User's use only and may not be sold, redistributed or otherwise used for commercial purposes  All illustrations and images included in CareNotes® are the copyrighted property of A [employer] M [employer] Inc  or [employer]  The above information is an  only  It is not intended as medical advice for individual conditions or treatments  Talk to your doctor, nurse or pharmacist before following any medical regimen to see if it is safe and effective for you

## 2018-07-16 NOTE — ED PROVIDER NOTES
History  Chief Complaint   Patient presents with    Flank Pain     Pt has right sided stent placed on June 12th and states pain is getting worse, pt scheduled for surgery on tuesday  67-year-old female with known right-sided ureterolithiasis presents with chief complaint of intractable right flank pain  Patient reports she had been taking oxycodone as an outpatient however she ran out was not able to fill a recent prescription due to insurance issues  Patient has surgery scheduled for 2 days from now for stone removal as well as stent removal which she currently has in her right ureter  No fevers, chills, dysuria  No hematuria  Patient does not appear to be in significant distress, is normotensive and has a normal heart rate and respiratory rate  History provided by:  Patient   used: No    Flank Pain   Pain location:  R flank  Pain quality: aching and sharp    Pain radiates to:  RLQ  Pain severity:  Moderate  Onset quality:  Gradual  Duration:  2 weeks  Timing:  Intermittent  Progression:  Waxing and waning  Chronicity:  Recurrent  Relieved by:  Nothing  Worsened by:  Nothing  Ineffective treatments:  NSAIDs and OTC medications  Associated symptoms: nausea    Associated symptoms: no chest pain, no chills, no diarrhea, no dysuria, no fever, no hematuria, no shortness of breath and no vomiting        Prior to Admission Medications   Prescriptions Last Dose Informant Patient Reported?  Taking?   acetaminophen (TYLENOL) 325 mg tablet  Self Yes No   Sig: Take 650 mg by mouth every 6 (six) hours as needed for mild pain   cetirizine (ZyrTEC) 10 mg tablet   Yes No   ciprofloxacin (CIPRO) 500 mg tablet   No No   Sig: Take 1 tablet (500 mg total) by mouth every 12 (twelve) hours for 7 days   famotidine (PEPCID) 20 mg tablet  Self No No   Sig: Take 1 tablet (20 mg total) by mouth 2 (two) times a day   hydrOXYzine HCL (ATARAX) 25 mg tablet   No No   Sig: Take 1 tablet (25 mg total) by mouth 2 (two) times a day as needed for itching or anxiety for up to 20 doses   ibuprofen (MOTRIN) 800 mg tablet   No No   Sig: Take 1 tablet (800 mg total) by mouth every 8 (eight) hours as needed for moderate pain   ibuprofen (MOTRIN) 800 mg tablet   No No   Sig: Take 1 tablet (800 mg total) by mouth every 8 (eight) hours as needed for mild pain or moderate pain for up to 10 days   ondansetron (ZOFRAN ODT) 4 mg disintegrating tablet   No No   Sig: Take 2 tablets (8 mg total) by mouth every 8 (eight) hours as needed for nausea or vomiting for up to 10 doses      Facility-Administered Medications: None       Past Medical History:   Diagnosis Date    Anesthesia complication     oxygen levels drop - always needs oxygen    Anxiety     Depression     Hypertension     Kidney stone     Motion sickness     Obesity     PONV (postoperative nausea and vomiting)     Scaly patch rash     UTI (urinary tract infection)     Wears glasses        Past Surgical History:   Procedure Laterality Date    CYSTOSCOPY      [place] CYSTO/URETERO W/LITHOTRIPSY &INDWELL STENT INSRT Right 6/12/2018    Procedure: CYSTOSCOPY URETEROSCOPY WITH LITHOTRIPSY HOLMIUM LASER, RETROGRADE PYELOGRAM AND INSERTION STENT URETERAL;  Surgeon: Verenice Aragon MD;  [facility];  Service: Urology    RENAL ARTERY STENT  2015       Family History   Problem Relation Age of Onset    Diabetes Mother     Hypertension Mother     Muscular dystrophy Family      I have reviewed and agree with the history as documented  Social History   Substance Use Topics    Smoking status: Current Some Day Smoker     Packs/day: 0 25     Types: Cigarettes    Smokeless tobacco: Never Used    Alcohol use Yes      Comment: occasional        Review of Systems   Constitutional: Negative for chills, diaphoresis and fever  Respiratory: Negative for shortness of breath  Cardiovascular: Negative for chest pain and palpitations     Gastrointestinal: Positive for nausea  Negative for diarrhea and vomiting  Genitourinary: Positive for flank pain  Negative for dysuria, frequency and hematuria  Skin: Negative for rash  All other systems reviewed and are negative  Physical Exam  Physical Exam   Constitutional: She is oriented to person, place, and time  She appears well-developed and well-nourished  No distress  HENT:   Head: Normocephalic and atraumatic  Eyes: EOM are normal  Pupils are equal, round, and reactive to light  Neck: Normal range of motion  No JVD present  Cardiovascular: Normal rate, regular rhythm, normal heart sounds and intact distal pulses  Exam reveals no gallop and no friction rub  No murmur heard  Pulmonary/Chest: Effort normal and breath sounds normal  No respiratory distress  She has no wheezes  She has no rales  She exhibits no tenderness  Abdominal: She exhibits no distension and no mass  There is no tenderness  There is no guarding  Musculoskeletal: Normal range of motion  She exhibits no tenderness  Neurological: She is alert and oriented to person, place, and time  Skin: Skin is warm and dry  Psychiatric: She has a normal mood and affect  Her behavior is normal  Judgment and thought content normal    Nursing note and vitals reviewed        Vital Signs  ED Triage Vitals   Temperature Pulse Respirations Blood Pressure SpO2   07/15/18 1942 07/15/18 1940 07/15/18 1940 07/15/18 1940 07/15/18 1940   99 3 °F (37 4 °C) 86 18 120/77 100 %      Temp Source Heart Rate Source Patient Position - Orthostatic VS BP Location FiO2 (%)   07/15/18 1940 07/15/18 1940 -- -- --   Oral Monitor         Pain Score       07/15/18 1940       Worst Possible Pain           Vitals:    07/15/18 1940   BP: 120/77   Pulse: 86       Visual Acuity      ED Medications  Medications   HYDROmorphone (DILAUDID) injection 1 mg (1 mg Intravenous Given 7/15/18 2056)   ondansetron (ZOFRAN) injection 4 mg (4 mg Intravenous Given 7/15/18 2056)       Diagnostic Studies  Results Reviewed     Procedure Component Value Units Date/Time    Urine Microscopic [[id number]]  (Abnormal) Collected:  07/15/18 2111    Lab Status:  Final result Specimen:  Urine from Urine, Clean Catch Updated:  07/15/18 2151     RBC, UA Innumerable (A) /hpf      WBC, UA 4-10 (A) /hpf      Epithelial Cells Occasional /hpf      Bacteria, UA Occasional /hpf      OTHER OBSERVATIONS Yeast Cells Present     MUCOUS THREADS Occasional (A)    Basic metabolic panel [[id number]] Collected:  07/15/18 2059    Lab Status:  Final result Specimen:  Blood from Arm, Left Updated:  07/15/18 2119     Sodium 140 mmol/L      Potassium 3 7 mmol/L      Chloride 107 mmol/L      CO2 24 mmol/L      Anion Gap 9 mmol/L      BUN 15 mg/dL      Creatinine 0 87 mg/dL      Glucose 105 mg/dL      Calcium 8 8 mg/dL      eGFR 89 ml/min/1 73sq m     Narrative:         National Kidney Disease Education Program recommendations are as follows:  GFR calculation is accurate only with a steady state creatinine  Chronic Kidney disease less than 60 ml/min/1 73 sq  meters  Kidney failure less than 15 ml/min/1 73 sq  meters      CBC and differential [[id number]]  (Abnormal) Collected:  07/15/18 2059    Lab Status:  Final result Specimen:  Blood from Arm, Left Updated:  07/15/18 2105     WBC 9 38 Thousand/uL      RBC 4 76 Million/uL      Hemoglobin 12 1 g/dL      Hematocrit 38 4 %      MCV 81 (L) fL      MCH 25 4 (L) pg      MCHC 31 5 g/dL      RDW 15 7 (H) %      MPV 10 4 fL      Platelets 206 Thousands/uL      Neutrophils Relative 66 %      Lymphocytes Relative 23 %      Monocytes Relative 7 %      Eosinophils Relative 3 %      Basophils Relative 0 %      Neutrophils Absolute 6 21 Thousands/µL      Lymphocytes Absolute 2 17 Thousands/µL      Monocytes Absolute 0 68 Thousand/µL      Eosinophils Absolute 0 30 Thousand/µL      Basophils Absolute 0 02 Thousands/µL     ED Urine Macroscopic [[id number]]  (Abnormal) Collected:  07/15/18 2111    Lab Status:  Final result Specimen:  Urine Updated:  07/15/18 2104     Color, UA Nilam     Clarity, UA Cloudy     pH, UA 5 5     Leukocytes, UA Small (A)     Nitrite, UA Negative     Protein, UA >=300 (A) mg/dl      Glucose, UA Negative mg/dl      Ketones, UA Negative mg/dl      Urobilinogen, UA 0 2 E U /dl      Bilirubin, UA Negative     Blood, UA Large (A)     Specific Gravity, UA >=1 030    Narrative:       CLINITEK RESULT                 No orders to display              Procedures  Procedures       Phone Contacts  ED Phone Contact    ED Course                               MDM  Number of Diagnoses or Management Options  Flank pain: new and requires workup  Diagnosis management comments:   Patient with known ureterolithiasis on the right with a current stent presents with chief complaint of worsening pain in the setting of recently running out of oxycodone  Suspect this is simply a pain control issue however will check CBC, BMP and urinalysis to rule out metabolic derangement, signs and symptoms of infection  Likely will discharge after pain control was established  Amount and/or Complexity of Data Reviewed  Clinical lab tests: ordered and reviewed    Risk of Complications, Morbidity, and/or Mortality  Diagnostic procedures: high  Management options: high  General comments: Patient states morphine gives her gi upset, will prescribe oxycodone/apap  Patient Progress  Patient progress: stable    CritCare Time    Disposition  Final diagnoses:   Flank pain - acute right sided     Time reflects when diagnosis was documented in both MDM as applicable and the Disposition within this note     Time User Action Codes Description Comment    7/15/2018  9:44 PM Brian Garcia Add [R10 9] Flank pain     7/15/2018  9:44 PM Brian Garcia Modify [R10 9] Flank pain acute right sided      ED Disposition     ED Disposition Condition Comment    Discharge  Kaye Violeta Phillips discharge to home/self care      Condition at discharge: Good Follow-up Information     Follow up With Specialties Details Why Lizette [facility]acon [facility]  52  For Urology [facility]   [address] [facility] [address] [phone] [phone]          Patient's Medications   Discharge Prescriptions    OXYCODONE-ACETAMINOPHEN (PERCOCET) 5-325 MG PER TABLET    Take 1 tablet by mouth every 4 (four) hours as needed for moderate pain for up to 15 doses Max Daily Amount: 6 tablets       Start Date: 7/15/2018 End Date: --       Order Dose: 1 tablet       Quantity: 15 tablet    Refills: 0     No discharge procedures on file      ED Provider  Electronically Signed by           Saskia Kelly MD  07/15/18 4428

## 2018-07-17 ENCOUNTER — HOSPITAL ENCOUNTER (OUTPATIENT)
Facility: HOSPITAL | Age: 33
Setting detail: OUTPATIENT SURGERY
Discharge: HOME | End: 2018-07-17
Attending: UROLOGY | Admitting: UROLOGY
Payer: COMMERCIAL

## 2018-07-17 ENCOUNTER — APPOINTMENT (OUTPATIENT)
Dept: RADIOLOGY | Facility: HOSPITAL | Age: 33
End: 2018-07-17
Payer: COMMERCIAL

## 2018-07-17 ENCOUNTER — ANESTHESIA (OUTPATIENT)
Dept: PERIOP | Facility: HOSPITAL | Age: 33
End: 2018-07-17
Payer: COMMERCIAL

## 2018-07-17 VITALS
HEIGHT: 64 IN | OXYGEN SATURATION: 96 % | DIASTOLIC BLOOD PRESSURE: 56 MMHG | WEIGHT: 265 LBS | BODY MASS INDEX: 45.24 KG/M2 | TEMPERATURE: 98.2 F | HEART RATE: 67 BPM | SYSTOLIC BLOOD PRESSURE: 121 MMHG | RESPIRATION RATE: 19 BRPM

## 2018-07-17 DIAGNOSIS — N20.0 NEPHROLITHIASIS: ICD-10-CM

## 2018-07-17 LAB — EXT PREGNANCY TEST URINE: NEGATIVE

## 2018-07-17 PROCEDURE — 82360 CALCULUS ASSAY QUANT: CPT | Performed by: UROLOGY

## 2018-07-17 PROCEDURE — 81025 URINE PREGNANCY TEST: CPT | Performed by: ANESTHESIOLOGY

## 2018-07-17 PROCEDURE — 52356 CYSTO/URETERO W/LITHOTRIPSY: CPT | Performed by: UROLOGY

## 2018-07-17 PROCEDURE — C1758 CATHETER, URETERAL: HCPCS | Performed by: UROLOGY

## 2018-07-17 PROCEDURE — 74450 X-RAY URETHRA/BLADDER: CPT

## 2018-07-17 PROCEDURE — C2617 STENT, NON-COR, TEM W/O DEL: HCPCS | Performed by: UROLOGY

## 2018-07-17 PROCEDURE — C1769 GUIDE WIRE: HCPCS | Performed by: UROLOGY

## 2018-07-17 DEVICE — INLAY OPTIMA URETERAL STENT W/O GUIDEWIRE
Type: IMPLANTABLE DEVICE | Site: URETER | Status: FUNCTIONAL
Brand: BARD® INLAY OPTIMA® URETERAL STENT

## 2018-07-17 RX ORDER — ACETAMINOPHEN 325 MG/1
650 TABLET ORAL ONCE
Status: COMPLETED | OUTPATIENT
Start: 2018-07-17 | End: 2018-07-17

## 2018-07-17 RX ORDER — SODIUM CHLORIDE 9 MG/ML
INJECTION, SOLUTION INTRAVENOUS AS NEEDED
Status: DISCONTINUED | OUTPATIENT
Start: 2018-07-17 | End: 2018-07-17 | Stop reason: HOSPADM

## 2018-07-17 RX ORDER — ONDANSETRON 2 MG/ML
INJECTION INTRAMUSCULAR; INTRAVENOUS AS NEEDED
Status: DISCONTINUED | OUTPATIENT
Start: 2018-07-17 | End: 2018-07-17 | Stop reason: SURG

## 2018-07-17 RX ORDER — ONDANSETRON 2 MG/ML
4 INJECTION INTRAMUSCULAR; INTRAVENOUS ONCE AS NEEDED
Status: DISCONTINUED | OUTPATIENT
Start: 2018-07-17 | End: 2018-07-17 | Stop reason: HOSPADM

## 2018-07-17 RX ORDER — OXYBUTYNIN CHLORIDE 10 MG/1
10 TABLET, EXTENDED RELEASE ORAL
Qty: 3 TABLET | Refills: 0 | Status: SHIPPED | OUTPATIENT
Start: 2018-07-17 | End: 2018-10-01 | Stop reason: ALTCHOICE

## 2018-07-17 RX ORDER — LIDOCAINE HYDROCHLORIDE 20 MG/ML
JELLY TOPICAL AS NEEDED
Status: DISCONTINUED | OUTPATIENT
Start: 2018-07-17 | End: 2018-07-17 | Stop reason: HOSPADM

## 2018-07-17 RX ORDER — GLYCOPYRROLATE 0.2 MG/ML
INJECTION INTRAMUSCULAR; INTRAVENOUS AS NEEDED
Status: DISCONTINUED | OUTPATIENT
Start: 2018-07-17 | End: 2018-07-17 | Stop reason: SURG

## 2018-07-17 RX ORDER — ROCURONIUM BROMIDE 10 MG/ML
INJECTION, SOLUTION INTRAVENOUS AS NEEDED
Status: DISCONTINUED | OUTPATIENT
Start: 2018-07-17 | End: 2018-07-17 | Stop reason: SURG

## 2018-07-17 RX ORDER — LEVOFLOXACIN 5 MG/ML
750 INJECTION, SOLUTION INTRAVENOUS EVERY 24 HOURS
Status: DISCONTINUED | OUTPATIENT
Start: 2018-07-17 | End: 2018-07-17 | Stop reason: HOSPADM

## 2018-07-17 RX ORDER — ONDANSETRON 2 MG/ML
4 INJECTION INTRAMUSCULAR; INTRAVENOUS EVERY 6 HOURS PRN
Status: DISCONTINUED | OUTPATIENT
Start: 2018-07-17 | End: 2018-07-17 | Stop reason: HOSPADM

## 2018-07-17 RX ORDER — MEPERIDINE HYDROCHLORIDE 50 MG/ML
12.5 INJECTION INTRAMUSCULAR; INTRAVENOUS; SUBCUTANEOUS ONCE AS NEEDED
Status: DISCONTINUED | OUTPATIENT
Start: 2018-07-17 | End: 2018-07-17 | Stop reason: HOSPADM

## 2018-07-17 RX ORDER — LEVOFLOXACIN 500 MG/1
500 TABLET, FILM COATED ORAL EVERY 24 HOURS
Qty: 5 TABLET | Refills: 0 | Status: SHIPPED | OUTPATIENT
Start: 2018-07-17 | End: 2018-07-22

## 2018-07-17 RX ORDER — MIDAZOLAM HYDROCHLORIDE 1 MG/ML
INJECTION INTRAMUSCULAR; INTRAVENOUS AS NEEDED
Status: DISCONTINUED | OUTPATIENT
Start: 2018-07-17 | End: 2018-07-17 | Stop reason: SURG

## 2018-07-17 RX ORDER — LIDOCAINE HYDROCHLORIDE 10 MG/ML
INJECTION, SOLUTION INFILTRATION; PERINEURAL AS NEEDED
Status: DISCONTINUED | OUTPATIENT
Start: 2018-07-17 | End: 2018-07-17 | Stop reason: SURG

## 2018-07-17 RX ORDER — FENTANYL CITRATE/PF 50 MCG/ML
50 SYRINGE (ML) INJECTION
Status: DISCONTINUED | OUTPATIENT
Start: 2018-07-17 | End: 2018-07-17 | Stop reason: HOSPADM

## 2018-07-17 RX ORDER — PROPOFOL 10 MG/ML
INJECTION, EMULSION INTRAVENOUS AS NEEDED
Status: DISCONTINUED | OUTPATIENT
Start: 2018-07-17 | End: 2018-07-17 | Stop reason: SURG

## 2018-07-17 RX ORDER — PHENAZOPYRIDINE HYDROCHLORIDE 100 MG/1
100 TABLET, FILM COATED ORAL ONCE
Status: COMPLETED | OUTPATIENT
Start: 2018-07-17 | End: 2018-07-17

## 2018-07-17 RX ORDER — FENTANYL CITRATE 50 UG/ML
INJECTION, SOLUTION INTRAMUSCULAR; INTRAVENOUS AS NEEDED
Status: DISCONTINUED | OUTPATIENT
Start: 2018-07-17 | End: 2018-07-17 | Stop reason: SURG

## 2018-07-17 RX ORDER — SCOLOPAMINE TRANSDERMAL SYSTEM 1 MG/1
1 PATCH, EXTENDED RELEASE TRANSDERMAL ONCE
Status: DISCONTINUED | OUTPATIENT
Start: 2018-07-17 | End: 2018-07-17 | Stop reason: HOSPADM

## 2018-07-17 RX ORDER — SODIUM CHLORIDE 9 MG/ML
125 INJECTION, SOLUTION INTRAVENOUS CONTINUOUS
Status: DISCONTINUED | OUTPATIENT
Start: 2018-07-17 | End: 2018-07-17 | Stop reason: HOSPADM

## 2018-07-17 RX ORDER — HYDROCODONE BITARTRATE AND ACETAMINOPHEN 5; 325 MG/1; MG/1
1 TABLET ORAL EVERY 6 HOURS PRN
Status: DISCONTINUED | OUTPATIENT
Start: 2018-07-17 | End: 2018-07-17 | Stop reason: HOSPADM

## 2018-07-17 RX ORDER — PHENAZOPYRIDINE HYDROCHLORIDE 100 MG/1
100 TABLET, FILM COATED ORAL 3 TIMES DAILY PRN
Qty: 10 TABLET | Refills: 0 | Status: SHIPPED | OUTPATIENT
Start: 2018-07-17 | End: 2018-10-01 | Stop reason: ALTCHOICE

## 2018-07-17 RX ADMIN — LIDOCAINE HYDROCHLORIDE 80 MG: 10 INJECTION, SOLUTION INFILTRATION; PERINEURAL at 07:36

## 2018-07-17 RX ADMIN — LEVOFLOXACIN 750 MG: 5 INJECTION, SOLUTION INTRAVENOUS at 07:45

## 2018-07-17 RX ADMIN — ONDANSETRON 4 MG: 2 INJECTION INTRAMUSCULAR; INTRAVENOUS at 10:07

## 2018-07-17 RX ADMIN — GLYCOPYRROLATE 0.6 MG: 0.2 INJECTION, SOLUTION INTRAMUSCULAR; INTRAVENOUS at 08:01

## 2018-07-17 RX ADMIN — SCOPALAMINE 1 PATCH: 1 PATCH, EXTENDED RELEASE TRANSDERMAL at 06:35

## 2018-07-17 RX ADMIN — PHENAZOPYRIDINE HYDROCHLORIDE 100 MG: 100 TABLET ORAL at 10:20

## 2018-07-17 RX ADMIN — SODIUM CHLORIDE 125 ML/HR: 0.9 INJECTION, SOLUTION INTRAVENOUS at 06:37

## 2018-07-17 RX ADMIN — SODIUM CHLORIDE: 0.9 INJECTION, SOLUTION INTRAVENOUS at 08:01

## 2018-07-17 RX ADMIN — ACETAMINOPHEN 650 MG: 325 TABLET, FILM COATED ORAL at 10:08

## 2018-07-17 RX ADMIN — ROCURONIUM BROMIDE 30 MG: 10 INJECTION INTRAVENOUS at 07:37

## 2018-07-17 RX ADMIN — NEOSTIGMINE METHYLSULFATE 4 MG: 1 INJECTION, SOLUTION INTRAMUSCULAR; INTRAVENOUS; SUBCUTANEOUS at 08:01

## 2018-07-17 RX ADMIN — MIDAZOLAM 2 MG: 1 INJECTION INTRAMUSCULAR; INTRAVENOUS at 07:31

## 2018-07-17 RX ADMIN — DEXAMETHASONE SODIUM PHOSPHATE 4 MG: 10 INJECTION INTRAMUSCULAR; INTRAVENOUS at 07:50

## 2018-07-17 RX ADMIN — PROPOFOL 200 MG: 10 INJECTION, EMULSION INTRAVENOUS at 07:36

## 2018-07-17 RX ADMIN — ONDANSETRON HYDROCHLORIDE 4 MG: 2 INJECTION, SOLUTION INTRAVENOUS at 07:50

## 2018-07-17 RX ADMIN — FENTANYL CITRATE 100 MCG: 50 INJECTION, SOLUTION INTRAMUSCULAR; INTRAVENOUS at 07:36

## 2018-07-17 NOTE — H&P (VIEW-ONLY)
7/13/2018  Kaye Veronica Band  1985  [id number]      Assessment  -Nephrolithiasis s/p ureteroscopy and RIGHT ureteral stent 6/12/18    Discussion  Kaye is a 33 y o  female being managed by Dr Garvey Felt  Patient is scheduled for upcoming ureteroscopy and insertion of right ureteral stent on 07/17/2018 with Dr Murphy Hand  We reviewed the risks and benefits of this procedure including but not limited to cardiopulmonary complications, bleeding, infection, damage to nearby structures such as bladder/ureter/kidney, need for nephrostomy tube, need for additional surgeries  Refill for only 5 tablets of Oxycodone were sent to patient's pharmacy, as she is unable to take Tramadol or Ditropan, and Ibuprofen/NSAID not helping with stent discomfort  Patient verbalized understanding  Consent will be obtained on the day of procedure by performing surgeon  All questions were answered  History of Present Illness  33 y o  female presents today to discuss her upcoming surgery  Patient recently evaluated at [facility] for 5 millimeter right obstructing ureteral calculus  Ureteral stent was placed on 06/12/2018 she was found to have UTI  She has since completed course of Cipro, and had stopped tamsulosin and ditropan due to allergy sensitivity  Patient re-presented to ER on 7/9/18 for persistent stent colic, dysuria, and urinary frequency and urgency  CT scan performed, which showed right ureteral stent in correct placement as well as known right ureteral calculi with mild hydronephrosis  She states that she continues to require Oxycodone for severe flank pain, which was refilled at ER visit  Patient reports that she is unable to take Tramadol, Percocet, or Vicodin due to allergy, and developed generalized rash and hives with Ditropan  She denies any episodes of gross hematuria, no fever, chills, nausea, or vomiting  Patient does report having missed menstrual cycle    Recent pregnancy testing was negative, she has been intermittently spotting  Review of Systems  Review of Systems   Constitutional: Negative  HENT: Negative  Respiratory: Negative  Cardiovascular: Negative  Gastrointestinal: Negative  Genitourinary: Positive for dysuria, flank pain (right sided), frequency and urgency  Negative for decreased urine volume, difficulty urinating and hematuria  Skin: Negative  Neurological: Negative  Psychiatric/Behavioral: Negative  Past Medical History  Past Medical History:   Diagnosis Date    Anesthesia complication     oxygen levels drop - always needs oxygen    Anxiety     Depression     Hypertension     Kidney stone     Motion sickness     Obesity     PONV (postoperative nausea and vomiting)     Scaly patch rash     UTI (urinary tract infection)     Wears glasses        Surgical History  Past Surgical History:   Procedure Laterality Date    CYSTOSCOPY      [place] CYSTO/URETERO W/LITHOTRIPSY &INDWELL STENT INSRT Right 6/12/2018    Procedure: CYSTOSCOPY URETEROSCOPY WITH LITHOTRIPSY HOLMIUM LASER, RETROGRADE PYELOGRAM AND INSERTION STENT URETERAL;  Surgeon: Tim Matos MD;  [facility];  Service: Urology    RENAL ARTERY STENT  2015       Family History  Family History   Problem Relation Age of Onset    Diabetes Mother     Hypertension Mother     Muscular dystrophy Family        Social History  Social History     Social History    Marital status: Single     Spouse name: N/A    Number of children: N/A    Years of education: N/A     Occupational History    Not on file       Social History Main Topics    Smoking status: Current Some Day Smoker     Packs/day: 0 25     Types: Cigarettes    Smokeless tobacco: Never Used    Alcohol use Yes      Comment: occasional    Drug use: No    Sexual activity: Not on file     Other Topics Concern    Not on file     Social History Narrative    No narrative on file       Current Medications  Current Outpatient Prescriptions   Medication Sig Dispense Refill    acetaminophen (TYLENOL) 325 mg tablet Take 650 mg by mouth every 6 (six) hours as needed for mild pain      ciprofloxacin (CIPRO) 500 mg tablet Take 1 tablet (500 mg total) by mouth every 12 (twelve) hours for 7 days 14 tablet 0    famotidine (PEPCID) 20 mg tablet Take 1 tablet (20 mg total) by mouth 2 (two) times a day 60 tablet 0    hydrOXYzine HCL (ATARAX) 25 mg tablet Take 1 tablet (25 mg total) by mouth 2 (two) times a day as needed for itching or anxiety for up to 20 doses 60 tablet 1    ibuprofen (MOTRIN) 800 mg tablet Take 1 tablet (800 mg total) by mouth every 8 (eight) hours as needed for moderate pain 90 tablet 0    ibuprofen (MOTRIN) 800 mg tablet Take 1 tablet (800 mg total) by mouth every 8 (eight) hours as needed for mild pain or moderate pain for up to 10 days 21 tablet 0    ondansetron (ZOFRAN ODT) 4 mg disintegrating tablet Take 2 tablets (8 mg total) by mouth every 8 (eight) hours as needed for nausea or vomiting for up to 10 doses 10 tablet 3    oxyCODONE (ROXICODONE) 10 MG TABS Take 1 tablet (10 mg total) by mouth 3 (three) times a day as needed for severe pain Max Daily Amount: 30 mg 21 tablet 0     No current facility-administered medications for this visit  Allergies  Allergies   Allergen Reactions    Ditropan [Oxybutynin] Hives    Flomax [Tamsulosin] Hives    Macrobid [Nitrofurantoin] Hives    Penicillins Hives       Vitals  There were no vitals filed for this visit  Physical Exam  Physical Exam   Constitutional: She is oriented to person, place, and time  She appears well-developed and well-nourished  HENT:   Head: Normocephalic  Eyes: Pupils are equal, round, and reactive to light  Neck: Normal range of motion  Cardiovascular: Normal rate, regular rhythm, normal heart sounds and intact distal pulses  Pulmonary/Chest: Effort normal and breath sounds normal    Abdominal: Soft  Normal appearance   There is no CVA tenderness  Musculoskeletal: Normal range of motion  Neurological: She is alert and oriented to person, place, and time  She has normal reflexes  Skin: Skin is warm and dry  Psychiatric: She has a normal mood and affect   Her behavior is normal  Judgment and thought content normal

## 2018-07-17 NOTE — OP NOTE
OPERATIVE REPORT  PATIENT NAME: Nicolle Roque    :  1985  MRN: [id number]  Pt Location: [facility] 05    SURGERY DATE: 2018    Surgeon(s) and Role:     * Gloria Burgess MD - Primary    Preop Diagnosis:  Nephrolithiasis [N20 0]    Post-Op Diagnosis Codes:     * Nephrolithiasis [N20 0]    Procedure(s) (LRB):  CYSTO, URETEROSCOPY W/HOLMIUM LASER, RETROGRADE PYELOGRAM, STENT REMOVAL,STENT INSERTION,STONE EXTRACTION WITH BASKET (Right)    Specimen(s):  ID Type Source Tests Collected by Time Destination   A : RIGHT URETER CALCULUS Calculus Ureter, Right STONE ANALYSIS Gloria Burgess MD 2018 [id number]        Estimated Blood Loss:   Minimal    Drains:  Ureteral Drain/Stent Right ureter 6 Fr  (Active)   Number of days: 35       Ureteral Drain/Stent Right ureter 6 Fr  (Active)   Number of days: 0       Anesthesia Type:   General    Operative Indications:  Nephrolithiasis [N20 0]      Operative Findings:  1  Distal right calculus in two pieces, easily removed  2  Stent replaced in good position, string attached    Complications:   None    Procedure and Technique:  Nicolle Roque is a 33y o -year-old female  with a history of distal right ureteral calculus and prior stent placement  Risk and benefits of ureteroscopy were discussed and reviewed  Informed consent was obtained  The patient was brought to the operating room on 2018  After the smooth induction of general ET anesthesia, the patient was placed in the dorsal lithotomy position  Her genitalia was prepped and draped in a sterile fashion  Intravenous antibiotics were administered in the form of levaquin 750mg  A timeout was performed with all members of the operative team confirmed the patient's identity, procedure to be performed, and laterality of the case  A 22 Maltese rigid cystoscope with 30° lens was inserted  The bladder was thoroughly inspected  It was no evidence of mucosal abnormalities or lesions   There were no calculi identified  Attention was focused on the RIGHT ureteral orifice  Stent previously placed was grasped and removed to urethral meatus  A Bard Solo Plus wire was passed through the stent into proximal renal pelvis secured as a safety  A short semirigid ureteroscope was then inserted adjacent to the wire  The stone was identified and was removed in two fragments via Expand basket  The ureteroscope was then repassed multiple times to ensure that the ureter was free and clear of any residual stones  Additional contrast was injected as a roadmap for stent insertion  The ureteroscope was then removed  The wire was backloaded through the cystoscope  The cystoscope was repassed into the bladder  A 6 Kiswahili 24 double-J ureteral stent was then placed over the previously banked safety wire without difficulty  The proximal coil was appreciated in the RIGHT renal pelvis and the distal coil was visualized within the bladder  The bladder was emptied and the cystoscope was removed  A string was left in place and secured to the right thigh with Tegaderm  2% viscous lidocaine was placed per urethra  Overall the patient tolerated the procedure well and were no complications  The patient was extubated in the operating room and transferred to the PACU in stable condition at the conclusion of the case      PLAN - Stent on string, removal in 3-5 days     I was present for the entire procedure    Patient Disposition:  PACU     SIGNATURE: Jayleen Enriquez MD  DATE: July 17, 2018  TIME: 8:04 AM

## 2018-07-17 NOTE — DISCHARGE INSTRUCTIONS
You have stent on a string, which will be removed in 3-5 days  Please take care not to remove with dressing or undressing  Our office staff will contact you to arrange an appointment for removal     Ureteroscopy   WHAT YOU NEED TO KNOW:   A ureteroscopy is a procedure to examine in the inside of your urinary tract, which includes your urethra, bladder, ureters, and kidneys  A ureteroscope is a small, thin tube with a light and camera on the end  Ureteroscopy can help your healthcare provider diagnose and treat problems in your urinary tract, such as kidney stones  DISCHARGE INSTRUCTIONS:   Medicine:   · Antibiotics  may be given to treat or prevent an infection  · Take your medicine as directed  Contact your healthcare provider if you think your medicine is not helping or if you have side effects  Tell him or her if you are allergic to any medicine  Keep a list of the medicines, vitamins, and herbs you take  Include the amounts, and when and why you take them  Bring the list or the pill bottles to follow-up visits  Carry your medicine list with you in case of an emergency  Follow up with your healthcare provider as directed:  Write down your questions so you remember to ask them during your visits  Drink liquids as directed  Liquids can help prevent kidney stones and urinary tract infections  Drink water and limit the amount of caffeine you drink  Caffeine may be found in coffee, tea, soda, sports drinks, and foods  Ask your healthcare provider how much liquid to drink each day  Contact your healthcare provider if:   · You have a fever  · You cannot urinate  · You have blood in your urine  · You are vomiting  · You have pain in your abdomen or side  · You have questions or concerns about your condition or care  © 2017 [address] [employer] Information is for End User's use only and may not be sold, redistributed or otherwise used for commercial purposes   All illustrations and images included in CareNotes® are the copyrighted property of A D A M , Inc  or Francis Hernandez  The above information is an  only  It is not intended as medical advice for individual conditions or treatments  Talk to your doctor, nurse or pharmacist before following any medical regimen to see if it is safe and effective for you

## 2018-07-18 ENCOUNTER — TELEPHONE (OUTPATIENT)
Dept: UROLOGY | Facility: CLINIC | Age: 33
End: 2018-07-18

## 2018-07-18 NOTE — TELEPHONE ENCOUNTER
Pt s/p right ureteroscopic stone extraction, right stent insertion by Dr João Felix 07/17/2018  Per Dr João Felix, pt's stent may be removed in 3-5 days  Spoke with pt   Pt plans to remove her own stent ( she has removed stents in the past) on Friday 07/20/2018  Plan to contact pt on Friday  Then will discuss follow up with pt

## 2018-07-19 ENCOUNTER — TELEPHONE (OUTPATIENT)
Dept: UROLOGY | Facility: CLINIC | Age: 33
End: 2018-07-19

## 2018-07-19 NOTE — TELEPHONE ENCOUNTER
Pt will be stopping by [place] office on Friday 07/20/2018 to  return to work letter  Letter is in pt's chart    Thanks

## 2018-07-20 DIAGNOSIS — N20.0 KIDNEY STONES: Primary | ICD-10-CM

## 2018-07-20 NOTE — TELEPHONE ENCOUNTER
Spoke with pt today  Pt removed stent today without difficulty  Discussed follow up for pt  Per Dr Loren Ureña, pt needs follow up in 3 months with [facility] prior to visit  [place] lives in [place] and prefers the [place]  Scripts for imaging mailed to pt  Pt will schedule own [facility]  Informed pt she may experience flank pain after removal of stent  May use ibuprofen 600mg every 6 hours and hydrate well with water  Pt verbalized understanding of instructions

## 2018-07-24 LAB
CA PHOS MFR STONE: 30 %
CALCIUM OXALATE DIHYDRATE MFR STONE IR: 25 %
COLOR STONE: NORMAL
COM MFR STONE: 45 %
COMMENT-STONE3: NORMAL
COMPOSITION: NORMAL
LABORATORY COMMENT REPORT: NORMAL
NIDUS STONE QL: NORMAL
PHOTO: NORMAL
SIZE STONE: NORMAL MM
STONE ANALYSIS-IMP: NORMAL
SURFACE CRYSTALS: NORMAL
WT STONE: 121 MG

## 2018-07-25 ENCOUNTER — OFFICE VISIT (OUTPATIENT)
Dept: FAMILY MEDICINE CLINIC | Facility: CLINIC | Age: 33
End: 2018-07-25
Payer: COMMERCIAL

## 2018-07-25 VITALS
RESPIRATION RATE: 18 BRPM | SYSTOLIC BLOOD PRESSURE: 124 MMHG | HEIGHT: 64 IN | HEART RATE: 76 BPM | TEMPERATURE: 98.8 F | DIASTOLIC BLOOD PRESSURE: 80 MMHG | BODY MASS INDEX: 45.24 KG/M2 | WEIGHT: 265 LBS

## 2018-07-25 DIAGNOSIS — N20.0 NEPHROLITHIASIS: Primary | ICD-10-CM

## 2018-07-25 DIAGNOSIS — R10.9 FLANK PAIN: ICD-10-CM

## 2018-07-25 PROBLEM — Z87.898 HISTORY OF RIGHT FLANK PAIN: Status: ACTIVE | Noted: 2018-07-25

## 2018-07-25 LAB
SL AMB  POCT GLUCOSE, UA: NORMAL
SL AMB LEUKOCYTE ESTERASE,UA: NEGATIVE
SL AMB POCT BILIRUBIN,UA: NEGATIVE
SL AMB POCT BLOOD,UA: NEGATIVE
SL AMB POCT CLARITY,UA: CLEAR
SL AMB POCT COLOR,UA: YELLOW
SL AMB POCT KETONES,UA: NEGATIVE
SL AMB POCT NITRITE,UA: NEGATIVE
SL AMB POCT PH,UA: 6
SL AMB POCT SPECIFIC GRAVITY,UA: 1.01
SL AMB POCT URINE PROTEIN: NEGATIVE
SL AMB POCT UROBILINOGEN: 0.2

## 2018-07-25 PROCEDURE — 99214 OFFICE O/P EST MOD 30 MIN: CPT | Performed by: NURSE PRACTITIONER

## 2018-07-25 PROCEDURE — 81003 URINALYSIS AUTO W/O SCOPE: CPT | Performed by: NURSE PRACTITIONER

## 2018-07-25 RX ORDER — OXYCODONE HYDROCHLORIDE AND ACETAMINOPHEN 5; 325 MG/1; MG/1
1 TABLET ORAL 2 TIMES DAILY
Qty: 14 TABLET | Refills: 0 | Status: SHIPPED | OUTPATIENT
Start: 2018-07-25 | End: 2018-08-01

## 2018-07-25 NOTE — ASSESSMENT & PLAN NOTE
Pain 7/10 especially at hs  Recently treated for kidney stones and will see urology next week as follow up  Will continue pain medication for another week

## 2018-07-25 NOTE — ASSESSMENT & PLAN NOTE
Continues to have right flank pain  Will follow up with urology next week  Will continue pain medication for another week only  UA in the office today was negative

## 2018-07-25 NOTE — PROGRESS NOTES
Assessment/Plan:    Nephrolithiasis  Continues to have right flank pain  Will follow up with urology next week  Will continue pain medication for another week only  UA in the office today was negative  Abdominal wall pain in right flank  Pain 7/10 especially at hs  Recently treated for kidney stones and will see urology next week as follow up  Will continue pain medication for another week  Problem List Items Addressed This Visit     Nephrolithiasis - Primary     Continues to have right flank pain  Will follow up with urology next week  Will continue pain medication for another week only  UA in the office today was negative  Relevant Medications    oxyCODONE-acetaminophen (PERCOCET) 5-325 mg per tablet    Other Relevant Orders    POCT urine dip auto non-scope (Completed)      Other Visit Diagnoses     Flank pain        acute right sided  Pain 7/10 especially at hs  Recently treated for kidney stones and will see urology next week as follow up  Will continue pain medication 1 wk    Relevant Medications    oxyCODONE-acetaminophen (PERCOCET) 5-325 mg per tablet            Subjective:      Patient ID: Cliff Edwards is a 33 y o  female  33year old presents for management of right flank pain related to kidney stones  Had stent in place, removed and lithotripsy  States she is using motrin for pain and not effective especially at hs  She would like a refill RX of percocet for moderate to severe pain especially at hs  Check PMED and our office is giving her percocet 5/325 which she takes at hs and in the AM   Pain today right flank, stabbing pain that radiated to upper thigh  Rate from 7-10  She is following up with urology next week  July 16th urine pregnancy test negative  Denies any recent sexual intercourse  History of kidney stones, is making diet changes, increasing fluids and cutting out soda          The following portions of the patient's history were reviewed and updated as appropriate: allergies, current medications, past family history, past medical history, past social history, past surgical history and problem list     Review of Systems   Constitutional: Negative  Respiratory: Negative  Cardiovascular: Negative  Gastrointestinal: Negative  Genitourinary: Positive for flank pain  See HPI         Objective:      /80   Pulse 76   Temp 98 8 °F (37 1 °C)   Resp 18   Ht 5' 4" (1 626 m)   Wt 120 kg (265 lb)   BMI 45 49 kg/m²          Physical Exam   Constitutional: She is oriented to person, place, and time  She appears well-developed and well-nourished  HENT:   Head: Normocephalic and atraumatic  Cardiovascular: Normal rate, regular rhythm and normal heart sounds  Pulmonary/Chest: Effort normal and breath sounds normal    Abdominal: Soft  Bowel sounds are normal  There is no hepatosplenomegaly  There is CVA tenderness (right)  Neurological: She is alert and oriented to person, place, and time  She has normal reflexes  Skin: Skin is warm and dry  Psychiatric: She has a normal mood and affect

## 2018-07-25 NOTE — PATIENT INSTRUCTIONS
Increase fluids to 64 oz a day of water and cranberry juice  Decrease salt in your diet  Fruits and vegetables and lean meat  I gave you RX for percocet for another week only for moderate to severe pain  See urology  Switch over to motrin  Forms completed for disability

## 2018-07-26 ENCOUNTER — DOCUMENTATION (OUTPATIENT)
Dept: FAMILY MEDICINE CLINIC | Facility: CLINIC | Age: 33
End: 2018-07-26

## 2018-08-20 ENCOUNTER — OFFICE VISIT (OUTPATIENT)
Dept: FAMILY MEDICINE CLINIC | Facility: CLINIC | Age: 33
End: 2018-08-20
Payer: COMMERCIAL

## 2018-08-20 VITALS
HEIGHT: 64 IN | DIASTOLIC BLOOD PRESSURE: 70 MMHG | SYSTOLIC BLOOD PRESSURE: 126 MMHG | BODY MASS INDEX: 44.73 KG/M2 | HEART RATE: 78 BPM | TEMPERATURE: 98.6 F | RESPIRATION RATE: 18 BRPM | WEIGHT: 262 LBS

## 2018-08-20 DIAGNOSIS — N20.0 NEPHROLITHIASIS: ICD-10-CM

## 2018-08-20 DIAGNOSIS — R10.9 LEFT FLANK PAIN: ICD-10-CM

## 2018-08-20 DIAGNOSIS — R10.9 RT FLANK PAIN: ICD-10-CM

## 2018-08-20 DIAGNOSIS — N92.6 IRREGULAR PERIODS/MENSTRUAL CYCLES: ICD-10-CM

## 2018-08-20 DIAGNOSIS — Z3A.01 LESS THAN 8 WEEKS GESTATION OF PREGNANCY: Primary | ICD-10-CM

## 2018-08-20 LAB
SL AMB  POCT GLUCOSE, UA: NEGATIVE
SL AMB LEUKOCYTE ESTERASE,UA: NEGATIVE
SL AMB POCT BILIRUBIN,UA: ABNORMAL
SL AMB POCT BLOOD,UA: NEGATIVE
SL AMB POCT CLARITY,UA: CLEAR
SL AMB POCT COLOR,UA: YELLOW
SL AMB POCT KETONES,UA: ABNORMAL
SL AMB POCT NITRITE,UA: NEGATIVE
SL AMB POCT PH,UA: 5
SL AMB POCT SPECIFIC GRAVITY,UA: 1.03
SL AMB POCT URINE HCG: POSITIVE
SL AMB POCT URINE PROTEIN: ABNORMAL
SL AMB POCT UROBILINOGEN: 0.2

## 2018-08-20 PROCEDURE — 81003 URINALYSIS AUTO W/O SCOPE: CPT | Performed by: FAMILY MEDICINE

## 2018-08-20 PROCEDURE — 3008F BODY MASS INDEX DOCD: CPT | Performed by: FAMILY MEDICINE

## 2018-08-20 PROCEDURE — 99213 OFFICE O/P EST LOW 20 MIN: CPT | Performed by: FAMILY MEDICINE

## 2018-08-20 PROCEDURE — 81025 URINE PREGNANCY TEST: CPT | Performed by: FAMILY MEDICINE

## 2018-08-20 NOTE — PROGRESS NOTES
Blake King 1985 female MRN: [id number]    Family Medicine Acute Visit    ASSESSMENT/PLAN   Problem List Items Addressed This Visit        Genitourinary    Nephrolithiasis     F/U w/ Urology 10/23/18  Right flank pain 2/2 kidney stones resolved after procedure  POC UA negative for blood            Other    Left flank pain     Cramping pain in early pregnancy  DDX physiology vs ectopic pregnancy vs spontaneous  vs other etiologies   precautions d/w pt  Need to go to ER if worsening or bleeding  Oral hydration, and take Tylenol PRN  No NSAIDs d/w pt  Less than 8 weeks gestation of pregnancy - Primary     LMP 7/10/2018 (spotting)  Ordered TVUS, quantitative hCG,  center referral given today  Will order prenatal lab as US confirmed  RTC in 4w         Relevant Orders    Ambulatory referral to Obstetrics / Gynecology    hCG, quantitative    US pelvis complete w transvaginal      Other Visit Diagnoses     Irregular periods/menstrual cycles        Relevant Orders    POCT urine HCG (Completed)    Ambulatory referral to Obstetrics / Gynecology    hCG, quantitative    US pelvis complete w transvaginal    Rt flank pain        Relevant Orders    POCT urine dip auto non-scope (Completed)        Future Appointments  Date Time Provider Teddy Dumont   10/23/2018 2:15 PM SYLVESTER Arauz [facility]        SUBJECTIVE  CC: left-sided cramping pain    HPI:  Blake King is a 33 y o  female who presents for left-sided cramping pain  MP: 2018, spotting or very light in middle of July, urine pregnancy test  negative  Cramping pain for the past week, intermittent, mild, non-radiating  Sexual active, no condom-use, one sexual partner "forever", no h/o STD  [id number], last spontaneous    Was told to have hgh-risk pregnancy for fibroid and DM  LMP: 7/10/2018, today is 5w 6d based on LMP    Currently, mild nausea in the morning not vomiting, resolved after drinking water  No bleeding, discharge, contractions  Not feel any fetal movement  No diarrhea, constipation  Denies CP, palpitation, SOB, wheezes  No depression or anxiety, just busy with her son having eczema and has to take [facility] to [facility] times for refractory eczema      Review of Systems  As above     Historical Information   The patient history was reviewed as follows:  Past Medical History:   Diagnosis Date    Anesthesia complication     oxygen levels drop - always needs oxygen    Anxiety     Depression     Hypertension     Kidney stone     Motion sickness     Obesity     PONV (postoperative nausea and vomiting)     Scaly patch rash     UTI (urinary tract infection)     Wears glasses          Past Surgical History:   Procedure Laterality Date    CYSTOSCOPY      [place] CYSTO/URETERO W/LITHOTRIPSY &INDWELL STENT INSRT Right 6/12/2018    Procedure: CYSTOSCOPY URETEROSCOPY WITH LITHOTRIPSY HOLMIUM LASER, RETROGRADE PYELOGRAM AND INSERTION STENT URETERAL;  Surgeon: Noaln Palmer MD;  [facility];  Service: Urology    AK CYSTO/URETERO W/LITHOTRIPSY &INDWELL STENT INSRT Right 7/17/2018    Procedure: CYSTO, URETEROSCOPY, RETROGRADE PYELOGRAM, STENT REMOVAL,STENT INSERTION,STONE EXTRACTION WITH BASKET;  Surgeon: Yahir Roman MD;  [facility];  Service: Urology    RENAL ARTERY STENT  2015     Family History   Problem Relation Age of Onset    Diabetes Mother     Hypertension Mother     Muscular dystrophy Family       Social History   History   Alcohol Use    Yes     Comment: occasional     History   Drug Use No     History   Smoking Status    Current Some Day Smoker    Packs/day: 0 25    Types: Cigarettes   Smokeless Tobacco    Never Used       Medications:     Current Outpatient Prescriptions:     cetirizine (ZyrTEC) 10 mg tablet, Take 10 mg by mouth daily  , Disp: , Rfl:     hydrOXYzine HCL (ATARAX) 25 mg tablet, Take 1 tablet (25 mg total) by mouth 2 (two) times a day as needed for itching or anxiety for up to 20 doses, Disp: 60 tablet, Rfl: 1    ibuprofen (MOTRIN) 800 mg tablet, Take 1 tablet (800 mg total) by mouth every 8 (eight) hours as needed for moderate pain, Disp: 90 tablet, Rfl: 0    ondansetron (ZOFRAN ODT) 4 mg disintegrating tablet, Take 2 tablets (8 mg total) by mouth every 8 (eight) hours as needed for nausea or vomiting for up to 10 doses (Patient not taking: Reported on 8/20/2018 ), Disp: 10 tablet, Rfl: 3    oxybutynin (DITROPAN XL) 10 MG 24 hr tablet, Take 1 tablet (10 mg total) by mouth daily at bedtime for 30 doses (Patient not taking: Reported on 8/20/2018 ), Disp: 3 tablet, Rfl: 0    phenazopyridine (PYRIDIUM) 100 mg tablet, Take 1 tablet (100 mg total) by mouth 3 (three) times a day as needed for bladder spasms (Patient not taking: Reported on 8/20/2018 ), Disp: 10 tablet, Rfl: 0    Allergies   Allergen Reactions    Dilaudid [Hydromorphone Hcl] Vomiting    Flomax [Tamsulosin] Hives    Macrobid [Nitrofurantoin] Hives    Penicillins Hives    Tramadol GI Intolerance     nausea       OBJECTIVE  Vitals:   Vitals:    08/20/18 1456   BP: 126/70   Pulse: 78   Resp: 18   Temp: 98 6 °F (37 °C)   Weight: 119 kg (262 lb)   Height: 5' 4" (1 626 m)         Physical Exam   Constitutional: She is oriented to person, place, and time  She appears well-developed and well-nourished  No distress  Morbidly obese   HENT:   Head: Normocephalic and atraumatic  Eyes: Pupils are equal, round, and reactive to light  Right eye exhibits no discharge  Left eye exhibits no discharge  No scleral icterus  Neck: Normal range of motion  Neck supple  No JVD present  Cardiovascular: Normal rate, regular rhythm and normal heart sounds  Exam reveals no gallop and no friction rub  No murmur heard  Pulmonary/Chest: Effort normal and breath sounds normal  No respiratory distress  She has no wheezes  She has no rales  Abdominal: Soft   Bowel sounds are normal  She exhibits no distension  There is no tenderness  There is no rebound and no guarding  Obese waist   Musculoskeletal: Normal range of motion  She exhibits no edema, tenderness or deformity  Lymphadenopathy:     She has no cervical adenopathy  Neurological: She is alert and oriented to person, place, and time  Skin: Skin is warm  No rash noted  She is not diaphoretic  No erythema  No pallor  Psychiatric: She has a normal mood and affect  Vitals reviewed         Ria Wang MD, PGY-2  [facility]   8/20/2018

## 2018-08-20 NOTE — ASSESSMENT & PLAN NOTE
F/U w/ Urology 10/23/18  Right flank pain 2/2 kidney stones resolved after procedure    POC UA negative for blood

## 2018-08-20 NOTE — ASSESSMENT & PLAN NOTE
Cramping pain in early pregnancy  DDX physiology vs ectopic pregnancy vs spontaneous  vs other etiologies   precautions d/w pt  Need to go to ER if worsening or bleeding  Oral hydration, and take Tylenol PRN  No NSAIDs d/w pt

## 2018-08-20 NOTE — ASSESSMENT & PLAN NOTE
LMP 7/10/2018 (spotting)  Ordered TVUS, quantitative hCG,  center referral given today  Will order prenatal lab as US confirmed    RTC in 4w

## 2018-08-27 DIAGNOSIS — O09.91 HIGH-RISK PREGNANCY IN FIRST TRIMESTER: Primary | ICD-10-CM

## 2018-08-28 ENCOUNTER — HOSPITAL ENCOUNTER (EMERGENCY)
Facility: HOSPITAL | Age: 33
Discharge: HOME | End: 2018-08-28
Attending: EMERGENCY MEDICINE
Payer: COMMERCIAL

## 2018-08-28 ENCOUNTER — APPOINTMENT (EMERGENCY)
Dept: ULTRASOUND IMAGING | Facility: HOSPITAL | Age: 33
End: 2018-08-28
Payer: COMMERCIAL

## 2018-08-28 VITALS
RESPIRATION RATE: 18 BRPM | TEMPERATURE: 98.3 F | WEIGHT: 267.2 LBS | BODY MASS INDEX: 45.86 KG/M2 | HEART RATE: 74 BPM | OXYGEN SATURATION: 98 % | SYSTOLIC BLOOD PRESSURE: 133 MMHG | DIASTOLIC BLOOD PRESSURE: 68 MMHG

## 2018-08-28 DIAGNOSIS — Z34.90 PREGNANCY AT EARLY STAGE: ICD-10-CM

## 2018-08-28 DIAGNOSIS — R10.9 ABDOMINAL PAIN DURING PREGNANCY, FIRST TRIMESTER: Primary | ICD-10-CM

## 2018-08-28 DIAGNOSIS — N83.202 LEFT OVARIAN CYST: ICD-10-CM

## 2018-08-28 DIAGNOSIS — D25.9 FIBROID, UTERINE: ICD-10-CM

## 2018-08-28 DIAGNOSIS — O26.891 ABDOMINAL PAIN DURING PREGNANCY, FIRST TRIMESTER: Primary | ICD-10-CM

## 2018-08-28 LAB
B-HCG SERPL-ACNC: 4772 MIU/ML
BILIRUB UR QL STRIP: NEGATIVE
CLARITY UR: CLEAR
COLOR UR: YELLOW
GLUCOSE SERPL-MCNC: 113 MG/DL (ref 65–140)
GLUCOSE UR STRIP-MCNC: NEGATIVE MG/DL
HGB UR QL STRIP.AUTO: NEGATIVE
KETONES UR STRIP-MCNC: NEGATIVE MG/DL
LEUKOCYTE ESTERASE UR QL STRIP: NEGATIVE
NITRITE UR QL STRIP: NEGATIVE
PH UR STRIP.AUTO: 6.5 [PH] (ref 4.5–8)
PROT UR STRIP-MCNC: NEGATIVE MG/DL
SP GR UR STRIP.AUTO: 1.02 (ref 1–1.03)
UROBILINOGEN UR QL STRIP.AUTO: 0.2 E.U./DL

## 2018-08-28 PROCEDURE — 99284 EMERGENCY DEPT VISIT MOD MDM: CPT

## 2018-08-28 PROCEDURE — 81003 URINALYSIS AUTO W/O SCOPE: CPT | Performed by: EMERGENCY MEDICINE

## 2018-08-28 PROCEDURE — 84702 CHORIONIC GONADOTROPIN TEST: CPT | Performed by: EMERGENCY MEDICINE

## 2018-08-28 PROCEDURE — 82948 REAGENT STRIP/BLOOD GLUCOSE: CPT

## 2018-08-28 PROCEDURE — 76815 OB US LIMITED FETUS(S): CPT

## 2018-08-28 PROCEDURE — 36415 COLL VENOUS BLD VENIPUNCTURE: CPT | Performed by: EMERGENCY MEDICINE

## 2018-08-28 NOTE — DISCHARGE INSTRUCTIONS
Abdominal Pain in Pregnancy   WHAT YOU NEED TO KNOW:   Abdominal pain during pregnancy is common  Some of the causes include heartburn, constipation, gas, false labor, and round ligament pain  Round ligament pain is caused by stretching of the ligaments that support your uterus  Abdominal pain may be caused by a health problem, such as a stomach virus or appendicitis (inflammation of the appendix)  The pain may also be caused by a problem with your pregnancy, such as a threatened miscarriage or  labor  DISCHARGE INSTRUCTIONS:   Follow up with your obstetrician within 3 days:  Write down your questions so you remember to ask them during your visits  Self-care:   · Rest may help to relieve round ligament pain  Ask your healthcare provider about other ways to relieve this pain, such as a supportive belt or pregnancy exercises  · Use a heating pad set to the lowest setting or a hot compress to apply heat to your abdomen  Do this for 20 to 30 minutes every 2 hours for as many days as directed  · Avoid quick changes in position or movements that cause pain  · Do not lie flat in bed or bend over if you have heartburn  Ask your obstetrician if you should make any changes to the foods you eat  Ask if you can take any medicines for heartburn  · Eat more fiber and drink more liquids to relieve constipation  Fiber is found in fruits, vegetables, and whole-grain foods, such as whole-wheat bread and cereals  Ask how much liquid to drink each day and which liquids are best for you  Contact your obstetrician if:  · You continue to have abdominal pain that cannot be relieved  · You have a fever  · You have questions or concerns about your condition or care  Return to the emergency department if:   · You have sudden, severe pain or cramps that are so bad that you cannot walk or talk  · You have a fast heartbeat, shortness of breath, and you feel lightheaded or faint       · You have vaginal bleeding or discharge  · You have nausea, vomiting, fever, and severe pain on your right side  © 2017 [address] [employer] Information is for End User's use only and may not be sold, redistributed or otherwise used for commercial purposes  All illustrations and images included in CareNotes® are the copyrighted property of [employer]  or Francis Hernandez  The above information is an  only  It is not intended as medical advice for individual conditions or treatments  Talk to your doctor, nurse or pharmacist before following any medical regimen to see if it is safe and effective for you  Ovarian Cyst   AMBULATORY CARE:   An ovarian cyst  is a sac that grows on an ovary  This sac usually contains fluid, but may sometimes have blood or tissue in it  Most ovarian cysts are harmless and go away without treatment in a few months  Some cysts can grow large, cause pain, or break open  Signs and symptoms of an ovarian cyst:  You may not feel anything or know that you have a cyst, or you may have any of the following:  · Severe pain in your lower abdomen and pelvic area that may be sharp and sudden or feel like a dull ache    · Fullness and swelling in your lower abdomen     · Infertility (not able to get pregnant)    · Late or painful periods    · Small amounts of bleeding between your periods    · Lower abdominal or pelvic pain during sex    · Nausea or vomiting  Call 911 for any of the following:   · You are too weak or dizzy to stand up  Seek care immediately if:   · You have severe abdominal pain  The pain may be sharp and sudden  · You have a fever  Contact your healthcare provider if:   · Your periods are early, late, or more painful than usual     · You have bleeding from your vagina that is not your period  · You have abdominal pain all the time  · Your abdomen is swollen  · You have feelings of fullness, pressure, or discomfort in your abdomen      · You have trouble urinating or emptying your bladder completely  · You have pain during intercourse  · You are losing weight without trying  · You have questions or concerns about your condition or care  Treatment  will depend on your age, test results, and the kind of cyst you have  Your healthcare provider may wait to see if your ovarian cyst will go away without treatment  You may be given hormone medicine, such as birth control pills  This medicine may help to control your periods, shrink a cyst, and prevent new cysts from forming  You may need surgery to have the cyst removed  Apply heat to decrease pain and cramping:  Sit in a warm bath, or place a heating pad (turned on low) or a hot water bottle on your abdomen  Do this for 15 to 20 minutes every hour for as many days as directed  Follow up with your healthcare provider as directed:  Write down your questions so you remember to ask them during your visits  © 2017 [facility] Information is for End User's use only and may not be sold, redistributed or otherwise used for commercial purposes  All illustrations and images included in CareNotes® are the copyrighted property of [employer]  or Francis Hernandez  The above information is an  only  It is not intended as medical advice for individual conditions or treatments  Talk to your doctor, nurse or pharmacist before following any medical regimen to see if it is safe and effective for you

## 2018-08-28 NOTE — ED PROVIDER NOTES
History  Chief Complaint   Patient presents with    Abdominal Pain Pregnant     pt informed RN she is about 7 weeks pregnant, c/o LLQ pain no n/v/d  concerned with headache and light headed feeling due to hx of diabetic during pregnancy  52-year-old female  at approximately 6 weeks gestation presents to the emergency department for evaluation of left-sided abdominal pain and cramping  Patient states last menstrual cycle was  but she only had spotting at that time  On  she had an outpatient procedure for kidney stones and a pregnancy test was negative at that time  patient has had fatigue and breast tenderness  No abnormal vaginal bleeding  She has not had an ultrasound for pregnancy to confirm location  Patient is blood type B positive  Patient's last 2 pregnancies were miscarriages        History provided by:  Patient  Pregnancy Problem   Quality: Left-sided pelvic pain and cramping  Severity:  Moderate  Onset quality:  Gradual  Duration:  7 days  Timing:  Intermittent  Progression:  Waxing and waning  Chronicity:  New  Prior pregnancy: yes    Pregnancy confirmed by ultrasound: no    Prenatal care:  Regular prenatal care  Relieved by:  Nothing  Worsened by:  Nothing  Ineffective treatments:  None tried  Associated symptoms: abdominal pain, fatigue and nausea    Associated symptoms: no back pain, no dysuria, no fever and no vaginal discharge    Risk factors: prior miscarriage    Risk factors: no bleeding disorder and no hx of ectopic pregnancy        Prior to Admission Medications   Prescriptions Last Dose Informant Patient Reported? Taking?    cetirizine (ZyrTEC) 10 mg tablet  Self Yes No   Sig: Take 10 mg by mouth daily     hydrOXYzine HCL (ATARAX) 25 mg tablet  Self No No   Sig: Take 1 tablet (25 mg total) by mouth 2 (two) times a day as needed for itching or anxiety for up to 20 doses   ibuprofen (MOTRIN) 800 mg tablet  Self No No   Sig: Take 1 tablet (800 mg total) by mouth every 8 (eight) hours as needed for moderate pain   ondansetron (ZOFRAN ODT) 4 mg disintegrating tablet  Self No No   Sig: Take 2 tablets (8 mg total) by mouth every 8 (eight) hours as needed for nausea or vomiting for up to 10 doses   Patient not taking: Reported on 8/20/2018    oxybutynin (DITROPAN XL) 10 MG 24 hr tablet  Self No No   Sig: Take 1 tablet (10 mg total) by mouth daily at bedtime for 30 doses   Patient not taking: Reported on 8/20/2018    phenazopyridine (PYRIDIUM) 100 mg tablet  Self No No   Sig: Take 1 tablet (100 mg total) by mouth 3 (three) times a day as needed for bladder spasms   Patient not taking: Reported on 8/20/2018       Facility-Administered Medications: None       Past Medical History:   Diagnosis Date    Anesthesia complication     oxygen levels drop - always needs oxygen    Anxiety     Depression     Hypertension     Kidney stone     Motion sickness     Obesity     PONV (postoperative nausea and vomiting)     Scaly patch rash     UTI (urinary tract infection)     Wears glasses        Past Surgical History:   Procedure Laterality Date    CYSTOSCOPY      WI CYSTO/URETERO W/LITHOTRIPSY &INDWELL STENT INSRT Right 6/12/2018    Procedure: CYSTOSCOPY URETEROSCOPY WITH LITHOTRIPSY HOLMIUM LASER, RETROGRADE PYELOGRAM AND INSERTION STENT URETERAL;  Surgeon: David Atkinson MD;  Location[facility];  Service: Urology    WI CYSTO/URETERO W/LITHOTRIPSY &INDWELL STENT INSRT Right 7/17/2018    Procedure: CYSTO, URETEROSCOPY, RETROGRADE PYELOGRAM, STENT REMOVAL,STENT INSERTION,STONE EXTRACTION WITH BASKET;  Surgeon: Shawn Garcia MD;  Location: [facility];  Service: Urology    RENAL ARTERY STENT  2015       Family History   Problem Relation Age of Onset    Diabetes Mother     Hypertension Mother     Muscular dystrophy Family      I have reviewed and agree with the history as documented      Social History   Substance Use Topics    Smoking status: Light Tobacco Smoker Packs/day: 0 20     Types: Cigarettes    Smokeless tobacco: Never Used      Comment: 1 every 3-4    Alcohol use No        Review of Systems   Constitutional: Positive for fatigue  Negative for appetite change and fever  Respiratory: Negative for cough and shortness of breath  Cardiovascular: Negative for leg swelling  Gastrointestinal: Positive for abdominal pain and nausea  Negative for vomiting  Genitourinary: Positive for flank pain and pelvic pain  Negative for dysuria, vaginal bleeding and vaginal discharge  Musculoskeletal: Negative for back pain  All other systems reviewed and are negative  Physical Exam  Physical Exam   Constitutional: She is oriented to person, place, and time  She appears well-developed and well-nourished  No distress  HENT:   Head: Normocephalic  Nose: Nose normal    Mouth/Throat: Oropharynx is clear and moist  No oropharyngeal exudate  Eyes: Conjunctivae and EOM are normal  Pupils are equal, round, and reactive to light  Neck: Normal range of motion  Neck supple  Cardiovascular: Normal rate, regular rhythm, normal heart sounds and intact distal pulses  Pulmonary/Chest: Effort normal and breath sounds normal    Abdominal: Soft  Bowel sounds are normal  She exhibits no distension  There is tenderness in the left lower quadrant  There is no rebound and no guarding  Musculoskeletal: Normal range of motion  She exhibits no edema, tenderness or deformity  Lymphadenopathy:     She has no cervical adenopathy  Neurological: She is alert and oriented to person, place, and time  She has normal strength and normal reflexes  No cranial nerve deficit or sensory deficit  She exhibits normal muscle tone  Coordination and gait normal    Skin: Skin is warm, dry and intact  No rash noted  Psychiatric: She has a normal mood and affect  Her behavior is normal  Judgment and thought content normal    Nursing note and vitals reviewed        Vital Signs  ED Triage Vitals [08/28/18 0905]   Temperature Pulse Respirations Blood Pressure SpO2   98 3 °F (36 8 °C) 74 18 133/68 98 %      Temp Source Heart Rate Source Patient Position - Orthostatic VS BP Location FiO2 (%)   Oral Monitor Sitting Right arm --      Pain Score       8           Vitals:    08/28/18 0905   BP: 133/68   Pulse: 74   Patient Position - Orthostatic VS: Sitting       Visual Acuity      ED Medications  Medications - No data to display    Diagnostic Studies  Results Reviewed     Procedure Component Value Units Date/Time    Quantitative hCG [[id number]]  (Abnormal) Collected:  08/28/18 0946    Lab Status:  Final result Specimen:  Blood from Arm, Right Updated:  08/28/18 1045     HCG, Quant 4,772 (H) mIU/mL     Narrative:          Expected Ranges:     Approximate               Approximate HCG  Gestation age          Concentration ( mIU/mL)  _____________          ______________________   Chet Decree                      HCG values  0 2-1                       5-50  1-2                           2-3                         100-5000  3-4                         500-30000  4-5                         1000-62218  5-6                         [id number]  6-8                         [id number]  8-12                        [id number]    UA w Reflex to Microscopic w Reflex to Culture [[id number]] Collected:  08/28/18 0927    Lab Status:  Final result Specimen:  Urine from Urine, Clean Catch Updated:  08/28/18 0936     Color, UA Yellow     Clarity, UA Clear     Specific Mchenry, UA 1 020     pH, UA 6 5     Leukocytes, UA Negative     Nitrite, UA Negative     Protein, UA Negative mg/dl      Glucose, UA Negative mg/dl      Ketones, UA Negative mg/dl      Urobilinogen, UA 0 2 E U /dl      Bilirubin, UA Negative     Blood, UA Negative    Fingerstick Glucose (POCT) [[id number]]  (Normal) Collected:  08/28/18 0909    Lab Status:  Final result Updated:  08/28/18 0910     POC Glucose 113 mg/dl                  US OB pregnancy limited with transvaginal   Final Result by Katelyn Payne MD (08/28 4345)   1  Very small intrauterine gestational sac containing yolk sac  Too small to assess gestational age and no fetal pole or cardiac activity is seen to confirm viability  Based on current consensus literature terminology, this is a pregnancy of uncertain    viability and serial quantitative beta hCG levels and/or follow-up ultrasound recommended to assess for viability as clinically indicated   Reference: N Engl J Med 814;29 pp  3607 to 1451  2  Uterine fibroids as above  Simple 4 cm left ovarian cyst             Workstation performed: [id number]                    Procedures  Procedures       Phone Contacts  ED Phone Contact    ED Course                               MDM  Number of Diagnoses or Management Options  Abdominal pain during pregnancy, first trimester: new and requires workup  Fibroid, uterine: new and requires workup  Left ovarian cyst: new and requires workup  Pregnancy at early stage: new and requires workup     Amount and/or Complexity of Data Reviewed  Clinical lab tests: reviewed and ordered  Tests in the radiology section of CPT®: ordered and reviewed  Decide to obtain previous medical records or to obtain history from someone other than the patient: yes  Independent visualization of images, tracings, or specimens: yes    Risk of Complications, Morbidity, and/or Mortality  General comments: 78-year-old female with early stage of pregnancy, left lower quadrant pain  Ultrasound demonstrates intrauterine gestational sac, no fetal heart tone noted yet  Patient will need close follow-up with OBGYN to ensure that this is a normal pregnancy  Discussed signs and symptoms to return to the emergency department  Patient agrees with discharge plan  Patient aware of uterine fibroids noted on ultrasound      Patient Progress  Patient progress: stable    CritCare Time    Disposition  Final diagnoses:   Abdominal pain during pregnancy, first trimester   Pregnancy at early stage   Left ovarian cyst   Fibroid, uterine     Time reflects when diagnosis was documented in both MDM as applicable and the Disposition within this note     Time User Action Codes Description Comment    8/28/2018 11:07 AM Karen Brooks [[id number],  R10 9] Abdominal pain during pregnancy, first trimester     8/28/2018 11:07 AM Karen Brooks [[id number]] Pregnancy at early stage     8/28/2018 11:07 AM Karen Brooks [[id number]] Left ovarian cyst     8/28/2018 11:07 AM Karen Brooks [D25 9] Fibroid, uterine       ED Disposition     ED Disposition Condition Comment    Discharge  Kaye Navarro discharge to home/self care      Condition at discharge: Stable        Follow-up Information     Follow up With Specialties Details [facility] [address] [facility],  Family Medicine Schedule an appointment as soon as possible for a visit in 1 day For recheck of current symptoms [place] [address]  [phone]            Discharge Medication List as of 8/28/2018 11:10 AM      CONTINUE these medications which have NOT CHANGED    Details   cetirizine (ZyrTEC) 10 mg tablet Take 10 mg by mouth daily  , Starting Thu 7/5/2018, Historical Med      hydrOXYzine HCL (ATARAX) 25 mg tablet Take 1 tablet (25 mg total) by mouth 2 (two) times a day as needed for itching or anxiety for up to 20 doses, Starting Thu 6/28/2018, Print      ibuprofen (MOTRIN) 800 mg tablet Take 1 tablet (800 mg total) by mouth every 8 (eight) hours as needed for moderate pain, Starting Thu 6/28/2018, Normal      ondansetron (ZOFRAN ODT) 4 mg disintegrating tablet Take 2 tablets (8 mg total) by mouth every 8 (eight) hours as needed for nausea or vomiting for up to 10 doses, Starting Mon 6/25/2018, Print      oxybutynin (DITROPAN XL) 10 MG 24 hr tablet Take 1 tablet (10 mg total) by mouth daily at bedtime for 30 doses, Starting Tue 7/17/2018, Until Thu 8/16/2018, Normal phenazopyridine (PYRIDIUM) 100 mg tablet Take 1 tablet (100 mg total) by mouth 3 (three) times a day as needed for bladder spasms, Starting Tue 7/17/2018, Normal           No discharge procedures on file      ED Provider  Electronically Signed by           Vandana Morgan DO  08/31/18 1528

## 2018-08-31 ENCOUNTER — HOSPITAL ENCOUNTER (OUTPATIENT)
Dept: ULTRASOUND IMAGING | Facility: HOSPITAL | Age: 33
Discharge: HOME | End: 2018-08-31

## 2018-08-31 DIAGNOSIS — N92.6 IRREGULAR PERIODS/MENSTRUAL CYCLES: ICD-10-CM

## 2018-08-31 DIAGNOSIS — Z3A.01 LESS THAN 8 WEEKS GESTATION OF PREGNANCY: ICD-10-CM

## 2018-10-01 ENCOUNTER — APPOINTMENT (EMERGENCY)
Dept: ULTRASOUND IMAGING | Facility: HOSPITAL | Age: 33
End: 2018-10-01
Payer: COMMERCIAL

## 2018-10-01 ENCOUNTER — HOSPITAL ENCOUNTER (EMERGENCY)
Facility: HOSPITAL | Age: 33
Discharge: HOME | End: 2018-10-01
Attending: EMERGENCY MEDICINE | Admitting: EMERGENCY MEDICINE
Payer: COMMERCIAL

## 2018-10-01 VITALS
TEMPERATURE: 98 F | OXYGEN SATURATION: 98 % | SYSTOLIC BLOOD PRESSURE: 133 MMHG | HEART RATE: 66 BPM | RESPIRATION RATE: 24 BRPM | WEIGHT: 264.55 LBS | BODY MASS INDEX: 45.41 KG/M2 | DIASTOLIC BLOOD PRESSURE: 68 MMHG

## 2018-10-01 DIAGNOSIS — O20.0 THREATENED MISCARRIAGE: Primary | ICD-10-CM

## 2018-10-01 LAB
ALBUMIN SERPL BCP-MCNC: 3 G/DL (ref 3.5–5)
ALP SERPL-CCNC: 82 U/L (ref 46–116)
ALT SERPL W P-5'-P-CCNC: 23 U/L (ref 12–78)
ANION GAP SERPL CALCULATED.3IONS-SCNC: 9 MMOL/L (ref 4–13)
AST SERPL W P-5'-P-CCNC: 13 U/L (ref 5–45)
B-HCG SERPL-ACNC: ABNORMAL MIU/ML
BACTERIA UR QL AUTO: ABNORMAL /HPF
BASOPHILS # BLD AUTO: 0.01 THOUSANDS/ΜL (ref 0–0.1)
BASOPHILS NFR BLD AUTO: 0 % (ref 0–1)
BILIRUB SERPL-MCNC: 0.2 MG/DL (ref 0.2–1)
BILIRUB UR QL STRIP: NEGATIVE
BUN SERPL-MCNC: 7 MG/DL (ref 5–25)
CALCIUM SERPL-MCNC: 8.9 MG/DL (ref 8.3–10.1)
CHLORIDE SERPL-SCNC: 104 MMOL/L (ref 100–108)
CLARITY UR: CLEAR
CO2 SERPL-SCNC: 23 MMOL/L (ref 21–32)
COLOR UR: YELLOW
CREAT SERPL-MCNC: 0.63 MG/DL (ref 0.6–1.3)
EOSINOPHIL # BLD AUTO: 0.05 THOUSAND/ΜL (ref 0–0.61)
EOSINOPHIL NFR BLD AUTO: 1 % (ref 0–6)
ERYTHROCYTE [DISTWIDTH] IN BLOOD BY AUTOMATED COUNT: 16.2 % (ref 11.6–15.1)
GFR SERPL CREATININE-BSD FRML MDRD: 120 ML/MIN/1.73SQ M
GLUCOSE SERPL-MCNC: 116 MG/DL (ref 65–140)
GLUCOSE UR STRIP-MCNC: ABNORMAL MG/DL
HCT VFR BLD AUTO: 40.4 % (ref 34.8–46.1)
HGB BLD-MCNC: 13.2 G/DL (ref 11.5–15.4)
HGB UR QL STRIP.AUTO: ABNORMAL
IMM GRANULOCYTES # BLD AUTO: 0.05 THOUSAND/UL (ref 0–0.2)
IMM GRANULOCYTES NFR BLD AUTO: 1 % (ref 0–2)
KETONES UR STRIP-MCNC: ABNORMAL MG/DL
LEUKOCYTE ESTERASE UR QL STRIP: NEGATIVE
LYMPHOCYTES # BLD AUTO: 1.37 THOUSANDS/ΜL (ref 0.6–4.47)
LYMPHOCYTES NFR BLD AUTO: 13 % (ref 14–44)
MCH RBC QN AUTO: 27.2 PG (ref 26.8–34.3)
MCHC RBC AUTO-ENTMCNC: 32.7 G/DL (ref 31.4–37.4)
MCV RBC AUTO: 83 FL (ref 82–98)
MONOCYTES # BLD AUTO: 0.53 THOUSAND/ΜL (ref 0.17–1.22)
MONOCYTES NFR BLD AUTO: 5 % (ref 4–12)
MUCOUS THREADS UR QL AUTO: ABNORMAL
NEUTROPHILS # BLD AUTO: 8.53 THOUSANDS/ΜL (ref 1.85–7.62)
NEUTS SEG NFR BLD AUTO: 80 % (ref 43–75)
NITRITE UR QL STRIP: NEGATIVE
NON-SQ EPI CELLS URNS QL MICRO: ABNORMAL /HPF
NRBC BLD AUTO-RTO: 0 /100 WBCS
PH UR STRIP.AUTO: 6 [PH] (ref 4.5–8)
PLATELET # BLD AUTO: 258 THOUSANDS/UL (ref 149–390)
PMV BLD AUTO: 10.2 FL (ref 8.9–12.7)
POTASSIUM SERPL-SCNC: 3.4 MMOL/L (ref 3.5–5.3)
PROT SERPL-MCNC: 6.7 G/DL (ref 6.4–8.2)
PROT UR STRIP-MCNC: NEGATIVE MG/DL
RBC # BLD AUTO: 4.86 MILLION/UL (ref 3.81–5.12)
RBC #/AREA URNS AUTO: ABNORMAL /HPF
SODIUM SERPL-SCNC: 136 MMOL/L (ref 136–145)
SP GR UR STRIP.AUTO: 1.02 (ref 1–1.03)
UROBILINOGEN UR QL STRIP.AUTO: 0.2 E.U./DL
WBC # BLD AUTO: 10.54 THOUSAND/UL (ref 4.31–10.16)
WBC #/AREA URNS AUTO: ABNORMAL /HPF

## 2018-10-01 PROCEDURE — 80053 COMPREHEN METABOLIC PANEL: CPT | Performed by: EMERGENCY MEDICINE

## 2018-10-01 PROCEDURE — 36415 COLL VENOUS BLD VENIPUNCTURE: CPT | Performed by: EMERGENCY MEDICINE

## 2018-10-01 PROCEDURE — 87147 CULTURE TYPE IMMUNOLOGIC: CPT | Performed by: EMERGENCY MEDICINE

## 2018-10-01 PROCEDURE — 87086 URINE CULTURE/COLONY COUNT: CPT | Performed by: EMERGENCY MEDICINE

## 2018-10-01 PROCEDURE — 96361 HYDRATE IV INFUSION ADD-ON: CPT

## 2018-10-01 PROCEDURE — 81001 URINALYSIS AUTO W/SCOPE: CPT | Performed by: EMERGENCY MEDICINE

## 2018-10-01 PROCEDURE — 85025 COMPLETE CBC W/AUTO DIFF WBC: CPT | Performed by: EMERGENCY MEDICINE

## 2018-10-01 PROCEDURE — 99284 EMERGENCY DEPT VISIT MOD MDM: CPT

## 2018-10-01 PROCEDURE — 96374 THER/PROPH/DIAG INJ IV PUSH: CPT

## 2018-10-01 PROCEDURE — 76801 OB US < 14 WKS SINGLE FETUS: CPT

## 2018-10-01 PROCEDURE — 84702 CHORIONIC GONADOTROPIN TEST: CPT | Performed by: EMERGENCY MEDICINE

## 2018-10-01 RX ORDER — ONDANSETRON 2 MG/ML
4 INJECTION INTRAMUSCULAR; INTRAVENOUS ONCE
Status: COMPLETED | OUTPATIENT
Start: 2018-10-01 | End: 2018-10-01

## 2018-10-01 RX ORDER — PYRIDOXINE HCL (VITAMIN B6) 50 MG
50 TABLET ORAL DAILY
Status: DISCONTINUED | OUTPATIENT
Start: 2018-10-01 | End: 2018-10-01 | Stop reason: HOSPADM

## 2018-10-01 RX ADMIN — ONDANSETRON 4 MG: 2 INJECTION INTRAMUSCULAR; INTRAVENOUS at 11:39

## 2018-10-01 RX ADMIN — PYRIDOXINE HCL TAB 50 MG 50 MG: 50 TAB at 11:46

## 2018-10-01 RX ADMIN — SODIUM CHLORIDE 1000 ML: 0.9 INJECTION, SOLUTION INTRAVENOUS at 11:28

## 2018-10-01 RX ADMIN — DOXYLAMINE SUCCINATE 12.5 MG: 25 TABLET ORAL at 11:47

## 2018-10-01 NOTE — DISCHARGE INSTRUCTIONS
Threatened Miscarriage   WHAT YOU NEED TO KNOW:   A threatened miscarriage occurs when you have vaginal bleeding within the first 20 weeks of pregnancy  It means that a miscarriage may happen  A threatened miscarriage may also be called a threatened   DISCHARGE INSTRUCTIONS:   Return to the emergency department if:   · You feel weak or faint  · Your pain or cramping in your abdomen or back gets worse  · You have vaginal bleeding that soaks 1 or more pads in an hour  · You pass material that looks like tissue or large clots  Contact your healthcare provider or obstetrician if:   · You have a fever  · You have trouble urinating, burning when you urinate, or feel a need to urinate often  · You have new or worsening vaginal bleeding  · You have vaginal pain or itching, or vaginal discharge that is yellow, green, or foul-smelling  · You have questions or concerns about your condition or care  Self-care: The following may help you manage your symptoms and decrease your risk for a miscarriage:  · Do not put anything in your vagina  Do not have sex, douche, or use tampons  These actions may increase your risk for infection and miscarriage  · Rest as directed  Do not exercise or do strenuous activities  These activities may cause  labor or miscarriage  Ask your healthcare provider what activities are okay to do  Stay healthy during pregnancy:   · Eat a variety of healthy foods  Healthy foods can help you get extra protein, water, and calories that you need while you are pregnant  Healthy foods include fruits, vegetables, whole-grain breads, low-fat dairy products, beans, lean meats, and fish  Avoid raw or undercooked meat and fish  Ask your healthcare provider if you need a special diet  · Take prenatal vitamins as directed  These help you get the right amount of vitamins and minerals  They may also decrease the risk of certain birth defects      · Do not drink alcohol or use illegal drugs  These can increase your risk for a miscarriage or harm your baby  · Do not smoke  Nicotine and other chemicals in cigarettes and cigars can harm your baby and cause miscarriage or  labor  Ask your healthcare provider for information if you currently smoke and need help to quit  E-cigarettes or smokeless tobacco still contain nicotine  Do not use these products  · Decrease your risk for an infection  Always wash your hands before eating or preparing meals  Do not spend time with people who are sick  Ask your healthcare provider if you need immunizations such as the flu or hepatitis B vaccine  Immunizations may decrease your risk for infections that could cause a miscarriage  · Manage your medical conditions  Keep your blood pressure and blood sugars under control  Maintain a healthy weight during pregnancy  Follow up with your obstetrician as directed: You may need to see your obstetrician frequently for ultrasounds or blood tests  Write down your questions so you remember to ask them during your visits  ©  [employer] Information is for End User's use only and may not be sold, redistributed or otherwise used for commercial purposes  All illustrations and images included in CareNotes® are the copyrighted property of [employer]  or Francis Hernandez  The above information is an  only  It is not intended as medical advice for individual conditions or treatments  Talk to your doctor, nurse or pharmacist before following any medical regimen to see if it is safe and effective for you

## 2018-10-01 NOTE — ED NOTES
Pt  Reports nausea has decreased and is tolerating PO fluid and snack that boyfriend brought to bedside well        Loretta Dietrich RN  10/01/18 0443

## 2018-10-01 NOTE — ED PROVIDER NOTES
History  Chief Complaint   Patient presents with    Vaginal Bleeding - Pregnant     pt states that she is 10 weeks pregnant and is having some light vaginal bleeding on and off  Pt has multiple pelvic issues and is here multiple complaints  80-year-old female presents today complaining of lower abdominal discomfort, cramping, and intermittent vaginal bleeding/spotting  Reports light pink on the toilet paper when she wipes intermittently  States symptoms have been going on for weeks  Called her OBGYN and was referred here for further evaluation  History provided by:  Patient  Abdominal Pain   Pain location:  Suprapubic, RLQ and LLQ  Pain quality: cramping    Pain radiates to:  Does not radiate  Pain severity:  Moderate  Timing:  Intermittent  Progression:  Waxing and waning  Chronicity:  Recurrent  Context comment:  Currently pregnant  Relieved by:  Nothing  Worsened by:  Nothing  Ineffective treatments: Is taking Zofran without relief  Associated symptoms: nausea    Associated symptoms: no chest pain, no chills, no constipation, no diarrhea, no dysuria, no fever, no shortness of breath, no vaginal discharge and no vomiting    Risk factors: obesity and pregnancy        Prior to Admission Medications   Prescriptions Last Dose Informant Patient Reported?  Taking?   ondansetron (ZOFRAN ODT) 4 mg disintegrating tablet Past Month at Unknown time Self No Yes   Sig: Take 2 tablets (8 mg total) by mouth every 8 (eight) hours as needed for nausea or vomiting for up to 10 doses      Facility-Administered Medications: None       Past Medical History:   Diagnosis Date    Anesthesia complication     oxygen levels drop - always needs oxygen    Anxiety     Depression     Hypertension     Kidney stone     Motion sickness     Obesity     PONV (postoperative nausea and vomiting)     Scaly patch rash     UTI (urinary tract infection)     Wears glasses        Past Surgical History:   Procedure Laterality Date    CYSTOSCOPY      [place] CYSTO/URETERO W/LITHOTRIPSY &INDWELL STENT INSRT Right 6/12/2018    Procedure: CYSTOSCOPY URETEROSCOPY WITH LITHOTRIPSY HOLMIUM LASER, RETROGRADE PYELOGRAM AND INSERTION STENT URETERAL;  Surgeon: Payal Valdez MD;  [facility];  Service: Urology    [place] CYSTO/URETERO W/LITHOTRIPSY &INDWELL STENT INSRT Right 7/17/2018    Procedure: CYSTO, URETEROSCOPY, RETROGRADE PYELOGRAM, STENT [address] EXTRACTION WITH BASKET;  Surgeon: Rohith Soriano MD;  [facility];  Service: Urology    RENAL ARTERY STENT  2015       Family History   Problem Relation Age of Onset    Diabetes Mother     Hypertension Mother     Muscular dystrophy Family      I have reviewed and agree with the history as documented  Social History   Substance Use Topics    Smoking status: Light Tobacco Smoker     Packs/day: 0 20     Types: Cigarettes    Smokeless tobacco: Never Used      Comment: 1 every 3-4    Alcohol use No        Review of Systems   Constitutional: Negative for chills and fever  HENT: Negative for congestion  Respiratory: Negative for chest tightness and shortness of breath  Cardiovascular: Negative for chest pain  Gastrointestinal: Positive for abdominal pain and nausea  Negative for constipation, diarrhea and vomiting  Genitourinary: Negative for dysuria and vaginal discharge  Musculoskeletal: Negative for back pain  Skin: Negative for pallor, rash and wound  Allergic/Immunologic: Negative for immunocompromised state  Neurological: Negative for dizziness and headaches  Psychiatric/Behavioral: Negative for confusion  Physical Exam  Physical Exam   Constitutional: She is oriented to person, place, and time  She appears well-developed and well-nourished  HENT:   Head: Normocephalic and atraumatic  Mouth/Throat: Uvula is midline, oropharynx is clear and moist and mucous membranes are normal  No tonsillar exudate     Eyes: Pupils are equal, round, and reactive to light  Neck: Normal range of motion  Neck supple  Cardiovascular: Normal rate and regular rhythm  Pulmonary/Chest: Effort normal and breath sounds normal    Abdominal: Soft  Bowel sounds are normal  There is no tenderness  There is no rebound and no guarding  Musculoskeletal: Normal range of motion  Neurological: She is alert and oriented to person, place, and time  Patient moving all extremities equally, no focal neuro deficits noted  Skin: Skin is warm and dry  Psychiatric: She has a normal mood and affect  Nursing note and vitals reviewed        Vital Signs  ED Triage Vitals [10/01/18 1039]   Temperature Pulse Respirations Blood Pressure SpO2   98 °F (36 7 °C) 73 18 137/81 99 %      Temp Source Heart Rate Source Patient Position - Orthostatic VS BP Location FiO2 (%)   Oral Monitor Sitting Left arm --      Pain Score       8           Vitals:    10/01/18 1039 10/01/18 1241   BP: 137/81 133/68   Pulse: 73 66   Patient Position - Orthostatic VS: Sitting        Visual Acuity      ED Medications  Medications   pyridoxine (VITAMIN B6) tablet 50 mg (50 mg Oral Given 10/1/18 1146)   sodium chloride 0 9 % bolus 1,000 mL (0 mL Intravenous Stopped 10/1/18 1331)   ondansetron (ZOFRAN) injection 4 mg (4 mg Intravenous Given 10/1/18 1139)   doxylamine (UNISON) tablet 12 5 mg (12 5 mg Oral Given 10/1/18 1147)       Diagnostic Studies  Results Reviewed     Procedure Component Value Units Date/Time    UA w Reflex to Microscopic w Reflex to Culture [[id number]]     Lab Status:  No result Specimen:  Urine     Quantitative hCG [[id number]]  (Abnormal) Collected:  10/01/18 1127    Lab Status:  Final result Specimen:  Blood from Arm, Right Updated:  10/01/18 1214     HCG, Quant 47,120 (H) mIU/mL     Narrative:          Expected Ranges:     Approximate               Approximate HCG  Gestation age          Concentration ( mIU/mL)  _____________          ______________________   Bernarda Garsia HCG values  0 2-1                       5-50  1-2                           2-3                         100-5000  3-4                         500-65246  4-5                         1000-78445  5-6                         [id number]  6-8                         [id number]  8-12                        [id number]    Comprehensive metabolic panel [[id number]]  (Abnormal) Collected:  10/01/18 1127    Lab Status:  Final result Specimen:  Blood from Arm, Right Updated:  10/01/18 1151     Sodium 136 mmol/L      Potassium 3 4 (L) mmol/L      Chloride 104 mmol/L      CO2 23 mmol/L      ANION GAP 9 mmol/L      BUN 7 mg/dL      Creatinine 0 63 mg/dL      Glucose 116 mg/dL      Calcium 8 9 mg/dL      AST 13 U/L      ALT 23 U/L      Alkaline Phosphatase 82 U/L      Total Protein 6 7 g/dL      Albumin 3 0 (L) g/dL      Total Bilirubin 0 20 mg/dL      eGFR 120 ml/min/1 73sq m     Narrative:         National Kidney Disease Education Program recommendations are as follows:  GFR calculation is accurate only with a steady state creatinine  Chronic Kidney disease less than 60 ml/min/1 73 sq  meters  Kidney failure less than 15 ml/min/1 73 sq  meters      CBC and differential [[id number]]  (Abnormal) Collected:  10/01/18 1127    Lab Status:  Final result Specimen:  Blood from Arm, Right Updated:  10/01/18 1134     WBC 10 54 (H) Thousand/uL      RBC 4 86 Million/uL      Hemoglobin 13 2 g/dL      Hematocrit 40 4 %      MCV 83 fL      MCH 27 2 pg      MCHC 32 7 g/dL      RDW 16 2 (H) %      MPV 10 2 fL      Platelets 367 Thousands/uL      nRBC 0 /100 WBCs      Neutrophils Relative 80 (H) %      Immat GRANS % 1 %      Lymphocytes Relative 13 (L) %      Monocytes Relative 5 %      Eosinophils Relative 1 %      Basophils Relative 0 %      Neutrophils Absolute 8 53 (H) Thousands/µL      Immature Grans Absolute 0 05 Thousand/uL      Lymphocytes Absolute 1 37 Thousands/µL      Monocytes Absolute 0 53 Thousand/µL Eosinophils Absolute 0 05 Thousand/µL      Basophils Absolute 0 01 Thousands/µL                  US OB < 14 weeks with transvaginal   Final Result by Maddy Canas MD (10/01 1242)      Single live intrauterine gestation at 10 weeks 5 days (range +/- one week)  SJ of 4/24/2018  Uterine fibroids  Workstation performed: [id number]                    Procedures  Procedures       Phone Contacts  ED Phone Contact    ED Course                               MDM  Number of Diagnoses or Management Options  Threatened miscarriage: new and requires workup  Diagnosis management comments: 1:15 PM  Ultrasound shows a IUP at approximately 10 weeks  Still waiting for UA  Amount and/or Complexity of Data Reviewed  Clinical lab tests: reviewed and ordered  Tests in the radiology section of CPT®: ordered and reviewed  Tests in the medicine section of CPT®: reviewed and ordered  Review and summarize past medical records: yes  Independent visualization of images, tracings, or specimens: yes    Risk of Complications, Morbidity, and/or Mortality  Presenting problems: high  Diagnostic procedures: high  Management options: high    Patient Progress  Patient progress: stable    CritCare Time    Disposition  Final diagnoses:   Threatened miscarriage     Time reflects when diagnosis was documented in both MDM as applicable and the Disposition within this note     Time User Action Codes Description Comment    10/1/2018  1:17 PM Caty Valdes Add [O20 0] Threatened miscarriage       ED Disposition     ED Disposition Condition Comment    Discharge  Kaye Sven Musccamilo discharge to home/self care  Condition at discharge: Stable        Follow-up Information     Follow up With Specialties Details Why Contact Info    your OB/Gyn  Schedule an appointment as soon as possible for a visit            Patient's Medications   Discharge Prescriptions    No medications on file     No discharge procedures on file      ED Provider  Electronically Signed by           Eduardo Marcos DO  10/01/18 5147

## 2018-10-02 LAB
BACTERIA UR CULT: ABNORMAL
BACTERIA UR CULT: ABNORMAL

## 2018-10-10 PROBLEM — R11.2 NAUSEA & VOMITING: Status: ACTIVE | Noted: 2018-10-10

## 2018-10-10 NOTE — PROGRESS NOTES
Haleigh Mclean 1985 female MRN: [id number]    Family Medicine Follow-up Visit    ASSESSMENT/PLAN  Nausea & vomiting  Nausea in pregnancy - start B6  If this is ineffective then will do diclegis  In addition start ranitidine for reflux symptoms  Will see Dr Jake Valenzuela on the  and MFRENEA on the   Future Appointments  Date Time Provider Teddy Dumont   10/16/2018 1:30 PM   [address]   10/19/2018 10:50 AM DO KIARA Allan [facility]   10/23/2018 2:15 PM SYLVESTER Em [facility]          SUBJECTIVE  CC: Nausea      HPI:  Haleigh Mclean is a 33 y o  female who presents for  Overall feeling ok but she does complain of nausea and occasional vomiting with this pregnancy  Was given zofran in the ED but that didn't help so she stopped taking it  Has had several episodes of non bloody, non bilious vomiting but it is not daily and she is able to tolerate PO intake well  She also complains of reflux type symptoms with a burning sensation in her upper abdomen, a sour taste in her mouth and this is worse when lying flat or after eating  Review of Systems   Constitutional: Negative for activity change  Respiratory: Negative for shortness of breath  Gastrointestinal: Positive for abdominal pain and nausea         Historical Information   The patient history was reviewed as follows:    Past Medical History:   Diagnosis Date    Anesthesia complication     oxygen levels drop - always needs oxygen    Anxiety     Depression     Hypertension     Kidney stone     Motion sickness     Obesity     PONV (postoperative nausea and vomiting)     Scaly patch rash     UTI (urinary tract infection)     Wears glasses      Past Surgical History:   Procedure Laterality Date    CYSTOSCOPY      [place] CYSTO/URETERO W/LITHOTRIPSY &INDWELL STENT INSRT Right 2018    Procedure: CYSTOSCOPY URETEROSCOPY WITH LITHOTRIPSY HOLMIUM LASER, RETROGRADE PYELOGRAM AND INSERTION STENT URETERAL;  Surgeon: Keely Henderson MD;  [facility];  Service: Urology    [place] CYSTO/URETERO W/LITHOTRIPSY &INDWELL STENT INSRT Right 7/17/2018    Procedure: CYSTO, URETEROSCOPY, RETROGRADE PYELOGRAM, [address] INSERTION,STONE EXTRACTION WITH BASKET;  Surgeon: Donato Tilley MD;  [facility];  Service: Urology    RENAL ARTERY STENT  2015     Family History   Problem Relation Age of Onset    Diabetes Mother     Hypertension Mother     Muscular dystrophy Family       Social History   History   Alcohol Use No     History   Drug Use No     History   Smoking Status    Light Tobacco Smoker    Packs/day: 0 20    Types: Cigarettes   Smokeless Tobacco    Never Used     Comment: 1 every 3-4       Medications:     Current Outpatient Prescriptions:     pyridoxine (B-6) 25 MG tablet, Take 1 tablet (25 mg total) by mouth daily, Disp: 30 tablet, Rfl: 2    ranitidine (ZANTAC) 150 mg tablet, Take 1 tablet (150 mg total) by mouth 2 (two) times a day, Disp: 60 tablet, Rfl: 2  Allergies   Allergen Reactions    Dilaudid [Hydromorphone Hcl] Vomiting    Flomax [Tamsulosin] Hives    Macrobid [Nitrofurantoin] Hives    Penicillins Hives    Tramadol GI Intolerance     nausea       OBJECTIVE    Vitals:   Vitals:    10/11/18 0935   BP: 130/90   Pulse: 82   Resp: 18   Temp: 98 3 °F (36 8 °C)   Weight: 122 kg (268 lb 3 2 oz)   Height: 5' 4" (1 626 m)           Physical Exam   Constitutional: She is oriented to person, place, and time  She appears well-developed and well-nourished  HENT:   Head: Normocephalic and atraumatic  Cardiovascular: Normal rate, regular rhythm and normal heart sounds  Pulmonary/Chest: Effort normal and breath sounds normal    Abdominal: Soft  Bowel sounds are normal    Musculoskeletal: Normal range of motion  Neurological: She is alert and oriented to person, place, and time  Skin: Skin is warm and dry     Psychiatric: She has a normal mood and affect   Her behavior is normal  Judgment normal

## 2018-10-11 ENCOUNTER — OFFICE VISIT (OUTPATIENT)
Dept: FAMILY MEDICINE CLINIC | Facility: CLINIC | Age: 33
End: 2018-10-11
Payer: COMMERCIAL

## 2018-10-11 VITALS
HEART RATE: 82 BPM | BODY MASS INDEX: 45.79 KG/M2 | RESPIRATION RATE: 18 BRPM | HEIGHT: 64 IN | SYSTOLIC BLOOD PRESSURE: 130 MMHG | DIASTOLIC BLOOD PRESSURE: 90 MMHG | WEIGHT: 268.2 LBS | TEMPERATURE: 98.3 F

## 2018-10-11 DIAGNOSIS — R11.2 NAUSEA AND VOMITING, INTRACTABILITY OF VOMITING NOT SPECIFIED, UNSPECIFIED VOMITING TYPE: Primary | ICD-10-CM

## 2018-10-11 PROCEDURE — 99213 OFFICE O/P EST LOW 20 MIN: CPT | Performed by: FAMILY MEDICINE

## 2018-10-11 RX ORDER — PYRIDOXINE HCL (VITAMIN B6) 25 MG
25 TABLET ORAL DAILY
Qty: 30 TABLET | Refills: 2 | Status: SHIPPED | OUTPATIENT
Start: 2018-10-11 | End: 2018-10-29 | Stop reason: SDUPTHER

## 2018-10-11 RX ORDER — RANITIDINE 150 MG/1
150 TABLET ORAL 2 TIMES DAILY
Qty: 60 TABLET | Refills: 2 | Status: SHIPPED | OUTPATIENT
Start: 2018-10-11 | End: 2018-11-14 | Stop reason: ALTCHOICE

## 2018-10-11 NOTE — ASSESSMENT & PLAN NOTE
Nausea in pregnancy - start B6  If this is ineffective then will do diclegis  In addition start ranitidine for reflux symptoms  Will see Dr Cathryn Reed on the 19th and BONILLA on the 16th

## 2018-10-16 ENCOUNTER — ROUTINE PRENATAL (OUTPATIENT)
Dept: PERINATAL CARE | Facility: CLINIC | Age: 33
End: 2018-10-16
Payer: COMMERCIAL

## 2018-10-16 VITALS
WEIGHT: 267.2 LBS | BODY MASS INDEX: 45.62 KG/M2 | DIASTOLIC BLOOD PRESSURE: 84 MMHG | SYSTOLIC BLOOD PRESSURE: 114 MMHG | HEIGHT: 64 IN | HEART RATE: 90 BPM

## 2018-10-16 DIAGNOSIS — Z36.82 ENCOUNTER FOR NUCHAL TRANSLUCENCY TESTING: ICD-10-CM

## 2018-10-16 DIAGNOSIS — O99.210 MATERNAL MORBID OBESITY, ANTEPARTUM (HCC): Primary | ICD-10-CM

## 2018-10-16 DIAGNOSIS — D25.9 UTERINE FIBROIDS AFFECTING PREGNANCY IN FIRST TRIMESTER: ICD-10-CM

## 2018-10-16 DIAGNOSIS — O35.2XX0 HEREDITARY DISEASE IN FAMILY POSSIBLY AFFECTING FETUS, AFFECTING MANAGEMENT OF MOTHER IN PREGNANCY, SINGLE OR UNSPECIFIED FETUS: ICD-10-CM

## 2018-10-16 DIAGNOSIS — Z3A.12 12 WEEKS GESTATION OF PREGNANCY: ICD-10-CM

## 2018-10-16 DIAGNOSIS — E66.01 MATERNAL MORBID OBESITY, ANTEPARTUM (HCC): Primary | ICD-10-CM

## 2018-10-16 DIAGNOSIS — O34.11 UTERINE FIBROIDS AFFECTING PREGNANCY IN FIRST TRIMESTER: ICD-10-CM

## 2018-10-16 DIAGNOSIS — O10.911 CHRONIC HYPERTENSION IN OBSTETRIC CONTEXT IN FIRST TRIMESTER: ICD-10-CM

## 2018-10-16 DIAGNOSIS — O09.91 HIGH-RISK PREGNANCY IN FIRST TRIMESTER: ICD-10-CM

## 2018-10-16 PROBLEM — R10.9 LEFT FLANK PAIN: Status: RESOLVED | Noted: 2018-08-20 | Resolved: 2018-10-16

## 2018-10-16 PROBLEM — L50.9 URTICARIA: Status: RESOLVED | Noted: 2018-07-03 | Resolved: 2018-10-16

## 2018-10-16 PROBLEM — Z32.00 ENCOUNTER FOR PREGNANCY TEST: Status: RESOLVED | Noted: 2018-04-13 | Resolved: 2018-10-16

## 2018-10-16 PROBLEM — N20.0 NEPHROLITHIASIS: Status: RESOLVED | Noted: 2018-06-12 | Resolved: 2018-10-16

## 2018-10-16 PROBLEM — Z87.898 HISTORY OF RIGHT FLANK PAIN: Status: RESOLVED | Noted: 2018-07-25 | Resolved: 2018-10-16

## 2018-10-16 PROBLEM — Z3A.01 LESS THAN 8 WEEKS GESTATION OF PREGNANCY: Status: RESOLVED | Noted: 2018-08-20 | Resolved: 2018-10-16

## 2018-10-16 PROBLEM — L55.9 BURN FROM THE SUN: Status: RESOLVED | Noted: 2018-06-11 | Resolved: 2018-10-16

## 2018-10-16 PROBLEM — R11.2 NAUSEA & VOMITING: Status: RESOLVED | Noted: 2018-10-10 | Resolved: 2018-10-16

## 2018-10-16 PROBLEM — N13.9 OBSTRUCTIVE UROPATHY: Status: RESOLVED | Noted: 2018-06-11 | Resolved: 2018-10-16

## 2018-10-16 PROCEDURE — 76813 OB US NUCHAL MEAS 1 GEST: CPT | Performed by: OBSTETRICS & GYNECOLOGY

## 2018-10-16 PROCEDURE — 99242 OFF/OP CONSLTJ NEW/EST SF 20: CPT | Performed by: OBSTETRICS & GYNECOLOGY

## 2018-10-16 PROCEDURE — 76801 OB US < 14 WKS SINGLE FETUS: CPT | Performed by: OBSTETRICS & GYNECOLOGY

## 2018-10-16 RX ORDER — ASPIRIN 81 MG/1
81 TABLET ORAL DAILY
Qty: 100 TABLET | Refills: 3 | Status: SHIPPED | OUTPATIENT
Start: 2018-10-16 | End: 2019-05-24 | Stop reason: ALTCHOICE

## 2018-10-17 ENCOUNTER — TELEPHONE (OUTPATIENT)
Dept: FAMILY MEDICINE CLINIC | Facility: CLINIC | Age: 33
End: 2018-10-17

## 2018-10-17 DIAGNOSIS — O24.119 PRE-EXISTING TYPE 2 DIABETES MELLITUS DURING PREGNANCY, ANTEPARTUM: Primary | ICD-10-CM

## 2018-10-17 DIAGNOSIS — O09.91 HIGH-RISK PREGNANCY IN FIRST TRIMESTER: Primary | ICD-10-CM

## 2018-10-17 NOTE — TELEPHONE ENCOUNTER
I received a message from the  center that states exactly how they want referral just in case  I did call yesterday for pt to find out and got this message this morning  1      In Epic : Plan Section   2      Medication and Orders   3      Type "[id number]"; which generates an Ambulatory Referral to                   Diabetic Education   4      To Department: Choose location   Type "[place]", a drop down list            appears, choose      Be  [place]   5      Provider Specialty: Diabetes Services   6      To provider leave blank   7      Priority choose  appropriately   8      Type of DSMT   Hard Stop (This is where you select your options)   9      Fill out the following areas   10    This will allow the referral to fall into the proper work Q

## 2018-10-17 NOTE — TELEPHONE ENCOUNTER
Patient scheduled to see you on 10/19/18 for prenatal visit  Spoke with  center, they are also following her for diabetes, etc  I entered referral for her to be seen there

## 2018-10-23 PROBLEM — O24.119 PRE-EXISTING TYPE 2 DIABETES MELLITUS DURING PREGNANCY, ANTEPARTUM: Status: ACTIVE | Noted: 2018-10-23

## 2018-10-23 NOTE — TELEPHONE ENCOUNTER
Just Nina Candelaria from diabetes center will be e-mailing you with this info to put in referral/order correctly for them, pt is being seen tomorrow

## 2018-10-24 ENCOUNTER — HOSPITAL ENCOUNTER (EMERGENCY)
Facility: HOSPITAL | Age: 33
Discharge: HOME | End: 2018-10-24
Attending: EMERGENCY MEDICINE
Payer: COMMERCIAL

## 2018-10-24 ENCOUNTER — TELEPHONE (OUTPATIENT)
Dept: PERINATAL CARE | Facility: CLINIC | Age: 33
End: 2018-10-24

## 2018-10-24 VITALS
OXYGEN SATURATION: 98 % | SYSTOLIC BLOOD PRESSURE: 123 MMHG | TEMPERATURE: 99 F | DIASTOLIC BLOOD PRESSURE: 52 MMHG | HEART RATE: 90 BPM | RESPIRATION RATE: 20 BRPM

## 2018-10-24 DIAGNOSIS — O26.852 SPOTTING AFFECTING PREGNANCY IN SECOND TRIMESTER: Primary | ICD-10-CM

## 2018-10-24 LAB
ALBUMIN SERPL BCP-MCNC: 3 G/DL (ref 3.5–5)
ALP SERPL-CCNC: 85 U/L (ref 46–116)
ALT SERPL W P-5'-P-CCNC: 33 U/L (ref 12–78)
ANION GAP SERPL CALCULATED.3IONS-SCNC: 12 MMOL/L (ref 4–13)
AST SERPL W P-5'-P-CCNC: 14 U/L (ref 5–45)
BACTERIA UR QL AUTO: NORMAL /HPF
BASOPHILS # BLD AUTO: 0.03 THOUSANDS/ΜL (ref 0–0.1)
BASOPHILS NFR BLD AUTO: 0 % (ref 0–1)
BILIRUB SERPL-MCNC: 0.1 MG/DL (ref 0.2–1)
BILIRUB UR QL STRIP: NEGATIVE
BUN SERPL-MCNC: 6 MG/DL (ref 5–25)
CALCIUM SERPL-MCNC: 9.1 MG/DL (ref 8.3–10.1)
CHLORIDE SERPL-SCNC: 105 MMOL/L (ref 100–108)
CLARITY UR: CLEAR
CO2 SERPL-SCNC: 24 MMOL/L (ref 21–32)
COLOR UR: YELLOW
CREAT SERPL-MCNC: 0.59 MG/DL (ref 0.6–1.3)
EOSINOPHIL # BLD AUTO: 0.08 THOUSAND/ΜL (ref 0–0.61)
EOSINOPHIL NFR BLD AUTO: 1 % (ref 0–6)
ERYTHROCYTE [DISTWIDTH] IN BLOOD BY AUTOMATED COUNT: 15.9 % (ref 11.6–15.1)
GFR SERPL CREATININE-BSD FRML MDRD: 123 ML/MIN/1.73SQ M
GLUCOSE SERPL-MCNC: 94 MG/DL (ref 65–140)
GLUCOSE UR STRIP-MCNC: ABNORMAL MG/DL
HCT VFR BLD AUTO: 40.1 % (ref 34.8–46.1)
HGB BLD-MCNC: 13.1 G/DL (ref 11.5–15.4)
HGB UR QL STRIP.AUTO: ABNORMAL
IMM GRANULOCYTES # BLD AUTO: 0.06 THOUSAND/UL (ref 0–0.2)
IMM GRANULOCYTES NFR BLD AUTO: 1 % (ref 0–2)
KETONES UR STRIP-MCNC: NEGATIVE MG/DL
LEUKOCYTE ESTERASE UR QL STRIP: NEGATIVE
LYMPHOCYTES # BLD AUTO: 1.96 THOUSANDS/ΜL (ref 0.6–4.47)
LYMPHOCYTES NFR BLD AUTO: 17 % (ref 14–44)
MCH RBC QN AUTO: 27.6 PG (ref 26.8–34.3)
MCHC RBC AUTO-ENTMCNC: 32.7 G/DL (ref 31.4–37.4)
MCV RBC AUTO: 84 FL (ref 82–98)
MONOCYTES # BLD AUTO: 0.75 THOUSAND/ΜL (ref 0.17–1.22)
MONOCYTES NFR BLD AUTO: 6 % (ref 4–12)
NEUTROPHILS # BLD AUTO: 8.95 THOUSANDS/ΜL (ref 1.85–7.62)
NEUTS SEG NFR BLD AUTO: 75 % (ref 43–75)
NITRITE UR QL STRIP: NEGATIVE
NON-SQ EPI CELLS URNS QL MICRO: NORMAL /HPF
NRBC BLD AUTO-RTO: 0 /100 WBCS
PH UR STRIP.AUTO: 6 [PH] (ref 4.5–8)
PLATELET # BLD AUTO: 292 THOUSANDS/UL (ref 149–390)
PMV BLD AUTO: 10.3 FL (ref 8.9–12.7)
POTASSIUM SERPL-SCNC: 3.5 MMOL/L (ref 3.5–5.3)
PROT SERPL-MCNC: 7.1 G/DL (ref 6.4–8.2)
PROT UR STRIP-MCNC: NEGATIVE MG/DL
RBC # BLD AUTO: 4.75 MILLION/UL (ref 3.81–5.12)
RBC #/AREA URNS AUTO: NORMAL /HPF
SODIUM SERPL-SCNC: 141 MMOL/L (ref 136–145)
SP GR UR STRIP.AUTO: >=1.03 (ref 1–1.03)
UROBILINOGEN UR QL STRIP.AUTO: 2 E.U./DL
WBC # BLD AUTO: 11.83 THOUSAND/UL (ref 4.31–10.16)
WBC #/AREA URNS AUTO: NORMAL /HPF

## 2018-10-24 PROCEDURE — 96374 THER/PROPH/DIAG INJ IV PUSH: CPT

## 2018-10-24 PROCEDURE — 85025 COMPLETE CBC W/AUTO DIFF WBC: CPT | Performed by: EMERGENCY MEDICINE

## 2018-10-24 PROCEDURE — 96375 TX/PRO/DX INJ NEW DRUG ADDON: CPT

## 2018-10-24 PROCEDURE — 36415 COLL VENOUS BLD VENIPUNCTURE: CPT | Performed by: EMERGENCY MEDICINE

## 2018-10-24 PROCEDURE — 96361 HYDRATE IV INFUSION ADD-ON: CPT

## 2018-10-24 PROCEDURE — 87086 URINE CULTURE/COLONY COUNT: CPT

## 2018-10-24 PROCEDURE — 81001 URINALYSIS AUTO W/SCOPE: CPT

## 2018-10-24 PROCEDURE — 99284 EMERGENCY DEPT VISIT MOD MDM: CPT

## 2018-10-24 PROCEDURE — 80053 COMPREHEN METABOLIC PANEL: CPT | Performed by: EMERGENCY MEDICINE

## 2018-10-24 RX ORDER — METOCLOPRAMIDE HYDROCHLORIDE 5 MG/ML
10 INJECTION INTRAMUSCULAR; INTRAVENOUS ONCE
Status: COMPLETED | OUTPATIENT
Start: 2018-10-24 | End: 2018-10-24

## 2018-10-24 RX ORDER — DIPHENHYDRAMINE HYDROCHLORIDE 50 MG/ML
12.5 INJECTION INTRAMUSCULAR; INTRAVENOUS ONCE
Status: COMPLETED | OUTPATIENT
Start: 2018-10-24 | End: 2018-10-24

## 2018-10-24 RX ADMIN — DIPHENHYDRAMINE HYDROCHLORIDE 12.5 MG: 50 INJECTION, SOLUTION INTRAMUSCULAR; INTRAVENOUS at 20:01

## 2018-10-24 RX ADMIN — SODIUM CHLORIDE 1000 ML: 0.9 INJECTION, SOLUTION INTRAVENOUS at 19:41

## 2018-10-24 RX ADMIN — METOCLOPRAMIDE 10 MG: 5 INJECTION, SOLUTION INTRAMUSCULAR; INTRAVENOUS at 19:59

## 2018-10-24 NOTE — ED PROVIDER NOTES
History  Chief Complaint   Patient presents with    Abdominal Cramping     Patient c/o abdominal cramping and spotting that started today  Is approx 14 weeks pregnant  States, 'I am under a lot of anxiety too  I have a bad headache as well  I don't know if it's the anxiety that is making it worse or what "  Has hx of 2 miscarriages  History provided by:  Patient   used: No     55-year-old female at 15 weeks gestation presented for evaluation of pelvic cramping and mild red spotting beginning today  She had vaginal bleeding earlier in her pregnancy  She has had a previously confirmed IUP  History of miscarriages in the past   Pain overall as mild  Denies any trauma, gush of fluid, fever, chills, urinary complaints  States she feels like she is dehydrated, loss of appetite some nausea but no vomiting  Soft and nontender abdomen  Plan labs, fluids, antiemetic, bedside ultrasound and will re-evaluate  Prior to Admission Medications   Prescriptions Last Dose Informant Patient Reported?  Taking?   aspirin (ECOTRIN LOW STRENGTH) 81 mg EC tablet   No Yes   Sig: Take 1 tablet (81 mg total) by mouth daily   ranitidine (ZANTAC) 150 mg tablet   No Yes   Sig: Take 1 tablet (150 mg total) by mouth 2 (two) times a day      Facility-Administered Medications: None       Past Medical History:   Diagnosis Date    Anesthesia complication     oxygen levels drop - always needs oxygen    Anxiety     Depression     Gestational diabetes     Hypertension     Kidney stone     Motion sickness     Obesity     PONV (postoperative nausea and vomiting)     Pre-existing type 2 diabetes mellitus during pregnancy, antepartum 10/23/2018    Scaly patch rash     UTI (urinary tract infection)     Wears glasses        Past Surgical History:   Procedure Laterality Date    CYSTOSCOPY      ID CYSTO/URETERO W/LITHOTRIPSY &INDWELL STENT INSRT Right 6/12/2018    Procedure: CYSTOSCOPY URETEROSCOPY WITH LITHOTRIPSY HOLMIUM LASER, RETROGRADE PYELOGRAM AND INSERTION STENT URETERAL;  Surgeon: Stephanie Stearns MD;  [facility];  Service: Urology    HI CYSTO/URETERO W/LITHOTRIPSY &INDWELL STENT INSRT Right 7/17/2018    Procedure: CYSTO, URETEROSCOPY, RETROGRADE PYELOGRAM, STENT [address] EXTRACTION WITH BASKET;  Surgeon: Patricia Luo MD;  Location: [facility];  Service: Urology    RENAL ARTERY STENT  2015       Family History   Problem Relation Age of Onset    Diabetes Mother     Hypertension Mother     Muscular dystrophy Family      I have reviewed and agree with the history as documented  Social History   Substance Use Topics    Smoking status: Former Smoker     Packs/day: 0 20     Types: Cigarettes     Quit date: 9/1/2018    Smokeless tobacco: Never Used      Comment: 1 every 3-4    Alcohol use No        Review of Systems   Constitutional: Positive for appetite change  Negative for activity change and fatigue  Respiratory: Negative for chest tightness and shortness of breath  Gastrointestinal: Positive for nausea  Negative for vomiting  Genitourinary: Positive for pelvic pain  Negative for difficulty urinating and dysuria  Musculoskeletal: Negative for back pain  Neurological: Negative for dizziness and weakness  All other systems reviewed and are negative  Physical Exam  Physical Exam   Constitutional: She is oriented to person, place, and time  She appears well-developed and well-nourished  No distress  HENT:   Head: Normocephalic  Mouth/Throat: Oropharynx is clear and moist    Cardiovascular: Normal rate and regular rhythm  Pulmonary/Chest: Effort normal    Abdominal: Soft  She exhibits no distension  There is no tenderness  Musculoskeletal: Normal range of motion  She exhibits no edema  Neurological: She is alert and oriented to person, place, and time  Skin: Skin is warm and dry  Psychiatric: She has a normal mood and affect   Her behavior is normal    Nursing note and vitals reviewed        Vital Signs  ED Triage Vitals   Temperature Pulse Respirations Blood Pressure SpO2   10/24/18 1852 10/24/18 1849 10/24/18 1849 10/24/18 1849 10/24/18 1849   99 °F (37 2 °C) 90 20 123/52 98 %      Temp Source Heart Rate Source Patient Position - Orthostatic VS BP Location FiO2 (%)   10/24/18 1852 10/24/18 1849 10/24/18 1849 10/24/18 1849 --   Oral Monitor Lying Right arm       Pain Score       --                  Vitals:    10/24/18 1849   BP: 123/52   Pulse: 90   Patient Position - Orthostatic VS: Lying       Visual Acuity      ED Medications  Medications   sodium chloride 0 9 % bolus 1,000 mL (0 mL Intravenous Stopped 10/24/18 2148)   diphenhydrAMINE (BENADRYL) injection 12 5 mg (12 5 mg Intravenous Given 10/24/18 2001)   metoclopramide (REGLAN) injection 10 mg (10 mg Intravenous Given 10/24/18 1959)       Diagnostic Studies  Results Reviewed     Procedure Component Value Units Date/Time    Urine culture [[id number]] Collected:  10/24/18 1952    Lab Status:  Final result Specimen:  Urine from Urine, Clean Catch Updated:  10/25/18 1620     Urine Culture 20,000-29,000 cfu/ml     Urine Microscopic [[id number]]  (Normal) Collected:  10/24/18 1952    Lab Status:  Final result Specimen:  Urine from Urine, Indwelling Corral Catheter Updated:  10/24/18 2044     RBC, UA 0-5 /hpf      WBC, UA None Seen /hpf      Epithelial Cells Occasional /hpf      Bacteria, UA None Seen /hpf     Comprehensive metabolic panel [[id number]]  (Abnormal) Collected:  10/24/18 1941    Lab Status:  Final result Specimen:  Blood from Arm, Right Updated:  10/24/18 2035     Sodium 141 mmol/L      Potassium 3 5 mmol/L      Chloride 105 mmol/L      CO2 24 mmol/L      ANION GAP 12 mmol/L      BUN 6 mg/dL      Creatinine 0 59 (L) mg/dL      Glucose 94 mg/dL      Calcium 9 1 mg/dL      AST 14 U/L      ALT 33 U/L      Alkaline Phosphatase 85 U/L      Total Protein 7 1 g/dL      Albumin 3 0 (L) g/dL Total Bilirubin 0 10 (L) mg/dL      eGFR 123 ml/min/1 73sq m     Narrative:         National Kidney Disease Education Program recommendations are as follows:  GFR calculation is accurate only with a steady state creatinine  Chronic Kidney disease less than 60 ml/min/1 73 sq  meters  Kidney failure less than 15 ml/min/1 73 sq  meters  CBC and differential [[id number]]  (Abnormal) Collected:  10/24/18 1941    Lab Status:  Final result Specimen:  Blood from Arm, Right Updated:  10/24/18 1951     WBC 11 83 (H) Thousand/uL      RBC 4 75 Million/uL      Hemoglobin 13 1 g/dL      Hematocrit 40 1 %      MCV 84 fL      MCH 27 6 pg      MCHC 32 7 g/dL      RDW 15 9 (H) %      MPV 10 3 fL      Platelets 063 Thousands/uL      nRBC 0 /100 WBCs      Neutrophils Relative 75 %      Immat GRANS % 1 %      Lymphocytes Relative 17 %      Monocytes Relative 6 %      Eosinophils Relative 1 %      Basophils Relative 0 %      Neutrophils Absolute 8 95 (H) Thousands/µL      Immature Grans Absolute 0 06 Thousand/uL      Lymphocytes Absolute 1 96 Thousands/µL      Monocytes Absolute 0 75 Thousand/µL      Eosinophils Absolute 0 08 Thousand/µL      Basophils Absolute 0 03 Thousands/µL     ED Urine Macroscopic [[id number]]  (Abnormal) Collected:  10/24/18 1952    Lab Status:  Final result Specimen:  Urine Updated:  10/24/18 1938     Color, UA Yellow     Clarity, UA Clear     pH, UA 6 0     Leukocytes, UA Negative     Nitrite, UA Negative     Protein, UA Negative mg/dl      Glucose,  (1/10%) (A) mg/dl      Ketones, UA Negative mg/dl      Urobilinogen, UA 2 0 (A) E U /dl      Bilirubin, UA Negative     Blood, UA Trace (A)     Specific Gravity, UA >=1 030    Narrative:       CLINITEK RESULT                 No orders to display              Procedures  Pelvic Ultrasound  Performed by: Burak Gardner  Authorized by: Burak Gardner     Procedure date/time:  10/24/2018 9:25 PM  Patient location:  ED  Other Assisting Provider:  Yes (comment)    Procedure details:     Indications: pregnant with vaginal bleeding      Assess:  Fetal viability    Technique:  Transabdominal obstetric (limited) exam  Uterine findings:     Single gestation: identified      Gestational sac: identified      Fetal pole: identified      Fetal heart rate: identified      Fetal heart rate (bpm):  140  Other findings:     Free pelvic fluid: not identified      Free peritoneal fluid: not identified             Phone Contacts  ED Phone Contact    ED Course                               MDM  Number of Diagnoses or Management Options  Spotting affecting pregnancy in second trimester:   Diagnosis management comments: 70-year-old female at about 14 weeks gestation presented with some pelvic cramping and vaginal spotting  Feeling somewhat dehydrated  She has had some nausea decreased oral intake  Had vaginal bleeding earlier in pregnancy with a confirmed IUP  Labs unremarkable here  Patient felt somewhat better with fluids, antiemetics  Bedside ultrasound unremarkable  Per medical records blood type is B-positive  Stable for discharge with continued OB follow-up  Amount and/or Complexity of Data Reviewed  Clinical lab tests: ordered and reviewed    Patient Progress  Patient progress: improved    CritCare Time    Disposition  Final diagnoses:   Spotting affecting pregnancy in second trimester     Time reflects when diagnosis was documented in both MDM as applicable and the Disposition within this note     Time User Action Codes Description Comment    10/24/2018  9:28 PM Jelena Naranjo Add [O26 852] Spotting affecting pregnancy in second trimester       ED Disposition     ED Disposition Condition Comment    Discharge  Kaye Macey Joseph discharge to home/self care      Condition at discharge: Good        Follow-up Information     Follow up With Specialties Details Why Contact Info Additional Information    Your Ob/Gyn         [facility] Emergency Medicine  If symptoms worsen [address]  [phone] [facility], [address], [place], [place], [address]          Discharge Medication List as of 10/24/2018  9:29 PM      CONTINUE these medications which have NOT CHANGED    Details   aspirin (ECOTRIN LOW STRENGTH) 81 mg EC tablet Take 1 tablet (81 mg total) by mouth daily, Starting Tue 10/16/2018, Normal      ranitidine (ZANTAC) 150 mg tablet Take 1 tablet (150 mg total) by mouth 2 (two) times a day, Starting Thu 10/11/2018, Normal      pyridoxine (B-6) 25 MG tablet Take 1 tablet (25 mg total) by mouth daily, Starting Thu 10/11/2018, Normal           No discharge procedures on file      ED Provider  Electronically Signed by           Shanelle Aponte MD  10/29/18 5243

## 2018-10-25 LAB — BACTERIA UR CULT: NORMAL

## 2018-10-25 NOTE — ED NOTES
Discharge instruction given to patient  No questions or concerns at this time  Patient able to ambulate out without difficulty        Shanita Ha RN  10/24/18 8417

## 2018-10-25 NOTE — DISCHARGE INSTRUCTIONS
Your lab work and ultrasound appear normal today  Please follow-up with your OBGYN  If you have any significantly worsening symptoms return to emergency department

## 2018-10-29 ENCOUNTER — ROUTINE PRENATAL (OUTPATIENT)
Dept: FAMILY MEDICINE CLINIC | Facility: CLINIC | Age: 33
End: 2018-10-29
Payer: COMMERCIAL

## 2018-10-29 VITALS — WEIGHT: 266 LBS | DIASTOLIC BLOOD PRESSURE: 70 MMHG | BODY MASS INDEX: 45.66 KG/M2 | SYSTOLIC BLOOD PRESSURE: 110 MMHG

## 2018-10-29 DIAGNOSIS — Z3A.14 14 WEEKS GESTATION OF PREGNANCY: Primary | ICD-10-CM

## 2018-10-29 DIAGNOSIS — Z34.81 ENCOUNTER FOR SUPERVISION OF NORMAL PREGNANCY IN MULTIGRAVIDA IN FIRST TRIMESTER: ICD-10-CM

## 2018-10-29 DIAGNOSIS — Z34.90 PREGNANCY, UNSPECIFIED GESTATIONAL AGE: ICD-10-CM

## 2018-10-29 DIAGNOSIS — R11.2 NAUSEA AND VOMITING, INTRACTABILITY OF VOMITING NOT SPECIFIED, UNSPECIFIED VOMITING TYPE: ICD-10-CM

## 2018-10-29 LAB
BACTERIA UR QL AUTO: ABNORMAL /HPF
BILIRUB UR QL STRIP: NEGATIVE
CLARITY UR: ABNORMAL
COLOR UR: YELLOW
CREAT UR-MCNC: 102 MG/DL
GLUCOSE UR STRIP-MCNC: ABNORMAL MG/DL
HGB UR QL STRIP.AUTO: NEGATIVE
HYALINE CASTS #/AREA URNS LPF: ABNORMAL /LPF
KETONES UR STRIP-MCNC: NEGATIVE MG/DL
LEUKOCYTE ESTERASE UR QL STRIP: NEGATIVE
NITRITE UR QL STRIP: NEGATIVE
NON-SQ EPI CELLS URNS QL MICRO: ABNORMAL /HPF
PH UR STRIP.AUTO: 6.5 [PH] (ref 4.5–8)
PROT UR STRIP-MCNC: NEGATIVE MG/DL
PROT UR-MCNC: 13 MG/DL
PROT/CREAT UR: 0.13 MG/G{CREAT} (ref 0–0.1)
RBC #/AREA URNS AUTO: ABNORMAL /HPF
SL AMB  POCT GLUCOSE, UA: 250
SL AMB LEUKOCYTE ESTERASE,UA: NEGATIVE
SL AMB POCT BILIRUBIN,UA: NEGATIVE
SL AMB POCT BLOOD,UA: ABNORMAL
SL AMB POCT CLARITY,UA: CLEAR
SL AMB POCT COLOR,UA: YELLOW
SL AMB POCT KETONES,UA: NEGATIVE
SL AMB POCT NITRITE,UA: NEGATIVE
SL AMB POCT PH,UA: 6
SL AMB POCT SPECIFIC GRAVITY,UA: 1.02
SL AMB POCT URINE PROTEIN: NEGATIVE
SL AMB POCT UROBILINOGEN: 0.2
SP GR UR STRIP.AUTO: 1.02 (ref 1–1.03)
UROBILINOGEN UR QL STRIP.AUTO: 1 E.U./DL
WBC #/AREA URNS AUTO: ABNORMAL /HPF

## 2018-10-29 PROCEDURE — G0145 SCR C/V CYTO,THINLAYER,RESCR: HCPCS | Performed by: FAMILY MEDICINE

## 2018-10-29 PROCEDURE — 81003 URINALYSIS AUTO W/O SCOPE: CPT | Performed by: FAMILY MEDICINE

## 2018-10-29 PROCEDURE — 84156 ASSAY OF PROTEIN URINE: CPT | Performed by: FAMILY MEDICINE

## 2018-10-29 PROCEDURE — 99213 OFFICE O/P EST LOW 20 MIN: CPT | Performed by: FAMILY MEDICINE

## 2018-10-29 PROCEDURE — 87591 N.GONORRHOEAE DNA AMP PROB: CPT | Performed by: FAMILY MEDICINE

## 2018-10-29 PROCEDURE — 82570 ASSAY OF URINE CREATININE: CPT | Performed by: FAMILY MEDICINE

## 2018-10-29 PROCEDURE — 81001 URINALYSIS AUTO W/SCOPE: CPT | Performed by: FAMILY MEDICINE

## 2018-10-29 PROCEDURE — 87491 CHLMYD TRACH DNA AMP PROBE: CPT | Performed by: FAMILY MEDICINE

## 2018-10-29 RX ORDER — PYRIDOXINE HCL (VITAMIN B6) 25 MG
25 TABLET ORAL 2 TIMES DAILY
Qty: 30 TABLET | Refills: 0 | Status: SHIPPED | OUTPATIENT
Start: 2018-10-29 | End: 2018-11-14 | Stop reason: ALTCHOICE

## 2018-10-29 RX ORDER — PNV NO.95/FERROUS FUM/FOLIC AC 28MG-0.8MG
1 TABLET ORAL DAILY
Qty: 90 TABLET | Refills: 2 | Status: SHIPPED | OUTPATIENT
Start: 2018-10-29 | End: 2018-11-26 | Stop reason: SDUPTHER

## 2018-10-30 NOTE — PROGRESS NOTES
32 yo [id number] with obesity, h/o GDM and preeclampsia in previous pregnancies, at [facility] 14w5d by first trimester US presented to office for initial prenatal visit  Pt complains of nausea and says B6 is not helping a lot,also she was seen in ER on 10/24 with complaints of spotting, US was done and since was reassuring pt was discharged  Pt denies any bleeding since then, reports bleeding at that time was only in form of spotting  She has also been seen at  centre and was advised to start ASA and was referred to diabetes program      1  Nausea   Will add Unisom 25 mg at night for nausea and increase B6 to twice a day  Advised small meals at frequent intervals  2  Obesity with h/o GDM in previous pregnancies  Pt recommended to gain no more than 10-15 pounds during her pregnancy, increase her exercise and follow healthy dietary habits  Will check A1c this visit  Pt referred to Diabetes in pregnancy program     3  H/o preecclampsia  Continue ASA 81 mg daily   Will check urine for protein creatinine ratio today     4  14 w Jose Vincent h/o spotting   Script for prenatal panel given today  Speculum exam revealed no abnormalities, pap smear sent with GC PCR  Pt advised to report in case of repeat spotting  Sequential screening t be performed at [facility]    5   H/o muscular dystrophy in father's previous baby  Pt to undergo genetic counseling    Follow up in 4 weeks

## 2018-10-31 ENCOUNTER — TELEPHONE (OUTPATIENT)
Dept: PERINATAL CARE | Facility: CLINIC | Age: 33
End: 2018-10-31

## 2018-11-01 LAB
CHLAMYDIA DNA CVX QL NAA+PROBE: NORMAL
N GONORRHOEA DNA GENITAL QL NAA+PROBE: NORMAL

## 2018-11-05 LAB
LAB AP GYN PRIMARY INTERPRETATION: NORMAL
Lab: NORMAL

## 2018-11-14 ENCOUNTER — TELEPHONE (OUTPATIENT)
Dept: FAMILY MEDICINE CLINIC | Facility: CLINIC | Age: 33
End: 2018-11-14

## 2018-11-14 ENCOUNTER — ULTRASOUND (OUTPATIENT)
Dept: PERINATAL CARE | Facility: CLINIC | Age: 33
End: 2018-11-14
Payer: COMMERCIAL

## 2018-11-14 ENCOUNTER — LAB (OUTPATIENT)
Dept: LAB | Facility: HOSPITAL | Age: 33
End: 2018-11-14
Payer: COMMERCIAL

## 2018-11-14 ENCOUNTER — TELEPHONE (OUTPATIENT)
Dept: PERINATAL CARE | Facility: CLINIC | Age: 33
End: 2018-11-14

## 2018-11-14 VITALS
BODY MASS INDEX: 46.16 KG/M2 | SYSTOLIC BLOOD PRESSURE: 130 MMHG | WEIGHT: 270.4 LBS | DIASTOLIC BLOOD PRESSURE: 77 MMHG | HEIGHT: 64 IN | HEART RATE: 89 BPM

## 2018-11-14 DIAGNOSIS — Z86.32 H/O GESTATIONAL DIABETES IN PRIOR PREGNANCY, CURRENTLY PREGNANT: ICD-10-CM

## 2018-11-14 DIAGNOSIS — Z3A.17 17 WEEKS GESTATION OF PREGNANCY: ICD-10-CM

## 2018-11-14 DIAGNOSIS — N92.6 IRREGULAR PERIODS/MENSTRUAL CYCLES: ICD-10-CM

## 2018-11-14 DIAGNOSIS — O34.11 UTERINE FIBROIDS AFFECTING PREGNANCY IN FIRST TRIMESTER: ICD-10-CM

## 2018-11-14 DIAGNOSIS — Z36.9 RESEARCH REQUESTED ANTENATAL ULTRASOUND SCAN: Primary | ICD-10-CM

## 2018-11-14 DIAGNOSIS — Z3A.01 LESS THAN 8 WEEKS GESTATION OF PREGNANCY: ICD-10-CM

## 2018-11-14 DIAGNOSIS — O99.210 MATERNAL MORBID OBESITY, ANTEPARTUM (HCC): ICD-10-CM

## 2018-11-14 DIAGNOSIS — R42 DIZZINESS: ICD-10-CM

## 2018-11-14 DIAGNOSIS — D25.9 UTERINE FIBROIDS AFFECTING PREGNANCY IN FIRST TRIMESTER: ICD-10-CM

## 2018-11-14 DIAGNOSIS — Z33.1 PREGNANT STATE, INCIDENTAL: ICD-10-CM

## 2018-11-14 DIAGNOSIS — E66.01 MATERNAL MORBID OBESITY, ANTEPARTUM (HCC): ICD-10-CM

## 2018-11-14 DIAGNOSIS — Z3A.14 14 WEEKS GESTATION OF PREGNANCY: ICD-10-CM

## 2018-11-14 DIAGNOSIS — O10.911 CHRONIC HYPERTENSION IN OBSTETRIC CONTEXT IN FIRST TRIMESTER: Primary | ICD-10-CM

## 2018-11-14 DIAGNOSIS — O09.299 H/O GESTATIONAL DIABETES IN PRIOR PREGNANCY, CURRENTLY PREGNANT: ICD-10-CM

## 2018-11-14 PROBLEM — N20.0 NEPHROLITHIASIS: Status: RESOLVED | Noted: 2018-06-12 | Resolved: 2018-11-14

## 2018-11-14 LAB
ABO GROUP BLD: NORMAL
ALBUMIN SERPL BCP-MCNC: 2.8 G/DL (ref 3.5–5)
ALP SERPL-CCNC: 80 U/L (ref 46–116)
ALT SERPL W P-5'-P-CCNC: 22 U/L (ref 12–78)
ANION GAP SERPL CALCULATED.3IONS-SCNC: 9 MMOL/L (ref 4–13)
AST SERPL W P-5'-P-CCNC: 14 U/L (ref 5–45)
B-HCG SERPL-ACNC: ABNORMAL MIU/ML
BASOPHILS # BLD AUTO: 0.02 THOUSANDS/ΜL (ref 0–0.1)
BASOPHILS NFR BLD AUTO: 0 % (ref 0–1)
BILIRUB SERPL-MCNC: 0.36 MG/DL (ref 0.2–1)
BLD GP AB SCN SERPL QL: NEGATIVE
BUN SERPL-MCNC: 6 MG/DL (ref 5–25)
CALCIUM SERPL-MCNC: 8.8 MG/DL (ref 8.3–10.1)
CHLORIDE SERPL-SCNC: 105 MMOL/L (ref 100–108)
CO2 SERPL-SCNC: 20 MMOL/L (ref 21–32)
CREAT SERPL-MCNC: 0.51 MG/DL (ref 0.6–1.3)
EOSINOPHIL # BLD AUTO: 0.06 THOUSAND/ΜL (ref 0–0.61)
EOSINOPHIL NFR BLD AUTO: 1 % (ref 0–6)
ERYTHROCYTE [DISTWIDTH] IN BLOOD BY AUTOMATED COUNT: 15.7 % (ref 11.6–15.1)
EST. AVERAGE GLUCOSE BLD GHB EST-MCNC: 120 MG/DL
GFR SERPL CREATININE-BSD FRML MDRD: 128 ML/MIN/1.73SQ M
GLUCOSE P FAST SERPL-MCNC: 100 MG/DL (ref 65–99)
HBA1C MFR BLD: 5.8 % (ref 4.2–6.3)
HBV SURFACE AG SER QL: NORMAL
HCT VFR BLD AUTO: 39.5 % (ref 34.8–46.1)
HGB BLD-MCNC: 12.6 G/DL (ref 11.5–15.4)
IMM GRANULOCYTES # BLD AUTO: 0.06 THOUSAND/UL (ref 0–0.2)
IMM GRANULOCYTES NFR BLD AUTO: 1 % (ref 0–2)
LYMPHOCYTES # BLD AUTO: 1.44 THOUSANDS/ΜL (ref 0.6–4.47)
LYMPHOCYTES NFR BLD AUTO: 14 % (ref 14–44)
MCH RBC QN AUTO: 27.3 PG (ref 26.8–34.3)
MCHC RBC AUTO-ENTMCNC: 31.9 G/DL (ref 31.4–37.4)
MCV RBC AUTO: 86 FL (ref 82–98)
MONOCYTES # BLD AUTO: 0.47 THOUSAND/ΜL (ref 0.17–1.22)
MONOCYTES NFR BLD AUTO: 5 % (ref 4–12)
NEUTROPHILS # BLD AUTO: 8.18 THOUSANDS/ΜL (ref 1.85–7.62)
NEUTS SEG NFR BLD AUTO: 79 % (ref 43–75)
NRBC BLD AUTO-RTO: 0 /100 WBCS
PLATELET # BLD AUTO: 292 THOUSANDS/UL (ref 149–390)
PMV BLD AUTO: 11.2 FL (ref 8.9–12.7)
POTASSIUM SERPL-SCNC: 3.4 MMOL/L (ref 3.5–5.3)
PROT SERPL-MCNC: 6.8 G/DL (ref 6.4–8.2)
RBC # BLD AUTO: 4.61 MILLION/UL (ref 3.81–5.12)
RH BLD: POSITIVE
RPR SER QL: NORMAL
RUBV IGG SERPL IA-ACNC: 77.8 IU/ML
SODIUM SERPL-SCNC: 134 MMOL/L (ref 136–145)
SPECIMEN EXPIRATION DATE: NORMAL
WBC # BLD AUTO: 10.23 THOUSAND/UL (ref 4.31–10.16)

## 2018-11-14 PROCEDURE — 76805 OB US >/= 14 WKS SNGL FETUS: CPT | Performed by: OBSTETRICS & GYNECOLOGY

## 2018-11-14 PROCEDURE — 87086 URINE CULTURE/COLONY COUNT: CPT

## 2018-11-14 PROCEDURE — 80053 COMPREHEN METABOLIC PANEL: CPT

## 2018-11-14 PROCEDURE — 83036 HEMOGLOBIN GLYCOSYLATED A1C: CPT

## 2018-11-14 PROCEDURE — 80081 OBSTETRIC PANEL INC HIV TSTG: CPT

## 2018-11-14 PROCEDURE — 84702 CHORIONIC GONADOTROPIN TEST: CPT

## 2018-11-14 PROCEDURE — 36415 COLL VENOUS BLD VENIPUNCTURE: CPT

## 2018-11-14 NOTE — TELEPHONE ENCOUNTER
Patient was at the [facility] today for an ultrasound  Sonographer noticed she never received diabetes education  This educator agreed to add this patient to the diabetes appointment schedule today  But patient decided not to stay for diabetes education  The Diabetes Program will attempt to reschedule this patient again

## 2018-11-14 NOTE — TELEPHONE ENCOUNTER
Pt called back and is requesting a new  order for a 17 week Ultrasound,but to be done at the [facility]

## 2018-11-14 NOTE — TELEPHONE ENCOUNTER
Called  center, was told patient had ultrasound, everything was normal, scheduled for level II on 18  Called patient, she feels like the results are not accurate, states she feels she prefers to have repeat  Are you able to repeat?

## 2018-11-14 NOTE — TELEPHONE ENCOUNTER
Patient did not have a good experience when she went for her ultrasound appointment today and was not seen  Requesting a new order for ultrasound for a different location   [phone]

## 2018-11-15 ENCOUNTER — TELEPHONE (OUTPATIENT)
Dept: PERINATAL CARE | Facility: CLINIC | Age: 33
End: 2018-11-15

## 2018-11-15 LAB — SCAN RESULT: NORMAL

## 2018-11-15 NOTE — TELEPHONE ENCOUNTER
Pt is scheduled to see me this Friday, will speak to her at that time as to why she feels the need for repeat US  Thanks

## 2018-11-15 NOTE — PROGRESS NOTES
Since Kaye had not made her appointment yet for diabetes education we quickly were able to arrange to have her see Amarilis Garrido today to get her set up with a glucometer at her [place] today  She became upset and left the office with out being seen by me  I called her at home that evening and reviewed the findings of her US  She then complained about her US today which was done by a sonographer who was being trained by another sonographer and she was concerned that her measurements maybe wrong due to the inexperience of her sonographer  She was set up for an early anatomy scan because of her history of gestational diabetes and her noncompliance with arranging a meeting with diabetes education for fear that she may have pre gestational diabetes and have an increased risk for a fetal anomaly  I  explained that at 17 weeks the anatomy is more limited than a normal 20 week scan but that her measurements appear to be adequately measured  After she left my office she went to the lab to complete part two of her sequential screen, her prenatal labs and hemoglobin A1c  The hemoglobin A1c returned back today at 5 8 so I shared this with Kaye  She was also reassured that part one of her sequential screen with a one in 1700 risk for Down's and one in 10,000 risk for trisomy 18  She is aware that part two will be completed in about 7-10 days  She was previously set up for a 21 week ultrasound and requests it be moved to 20 weeks and she agreed to set up a visit with diabetes education clarisse Ratliff MD

## 2018-11-16 LAB
BACTERIA UR CULT: NORMAL
HIV 1+2 AB+HIV1 P24 AG SERPL QL IA: NORMAL

## 2018-11-19 ENCOUNTER — TELEPHONE (OUTPATIENT)
Dept: PERINATAL CARE | Facility: CLINIC | Age: 33
End: 2018-11-19

## 2018-11-19 DIAGNOSIS — Z3A.17 17 WEEKS GESTATION OF PREGNANCY: Primary | ICD-10-CM

## 2018-11-19 NOTE — TELEPHONE ENCOUNTER
Patient requesting a NEW ORDER to have another Ultra Sound done  She was called back after leaving an unsatisfied visit and told that if her pcp draws up an order to go to the ER

## 2018-11-21 ENCOUNTER — HOSPITAL ENCOUNTER (OUTPATIENT)
Dept: RADIOLOGY | Facility: HOSPITAL | Age: 33
Discharge: HOME | End: 2018-11-21
Payer: COMMERCIAL

## 2018-11-21 ENCOUNTER — TRANSCRIBE ORDERS (OUTPATIENT)
Dept: ADMINISTRATIVE | Facility: HOSPITAL | Age: 33
End: 2018-11-21

## 2018-11-21 DIAGNOSIS — Z3A.17 17 WEEKS GESTATION OF PREGNANCY: ICD-10-CM

## 2018-11-21 PROCEDURE — 76816 OB US FOLLOW-UP PER FETUS: CPT

## 2018-11-21 PROCEDURE — 76817 TRANSVAGINAL US OBSTETRIC: CPT

## 2018-11-26 ENCOUNTER — ROUTINE PRENATAL (OUTPATIENT)
Dept: FAMILY MEDICINE CLINIC | Facility: CLINIC | Age: 33
End: 2018-11-26
Payer: COMMERCIAL

## 2018-11-26 VITALS — WEIGHT: 274.2 LBS | SYSTOLIC BLOOD PRESSURE: 110 MMHG | BODY MASS INDEX: 47.07 KG/M2 | DIASTOLIC BLOOD PRESSURE: 80 MMHG

## 2018-11-26 DIAGNOSIS — O34.11 UTERINE FIBROIDS AFFECTING PREGNANCY IN FIRST TRIMESTER: ICD-10-CM

## 2018-11-26 DIAGNOSIS — Z3A.14 14 WEEKS GESTATION OF PREGNANCY: ICD-10-CM

## 2018-11-26 DIAGNOSIS — D25.9 UTERINE FIBROIDS AFFECTING PREGNANCY IN FIRST TRIMESTER: ICD-10-CM

## 2018-11-26 DIAGNOSIS — Z3A.17 17 WEEKS GESTATION OF PREGNANCY: ICD-10-CM

## 2018-11-26 PROBLEM — Z3A.18 18 WEEKS GESTATION OF PREGNANCY: Status: ACTIVE | Noted: 2018-11-26

## 2018-11-26 LAB
SL AMB  POCT GLUCOSE, UA: NEGATIVE
SL AMB LEUKOCYTE ESTERASE,UA: NEGATIVE
SL AMB POCT BILIRUBIN,UA: NEGATIVE
SL AMB POCT BLOOD,UA: ABNORMAL
SL AMB POCT CLARITY,UA: CLEAR
SL AMB POCT COLOR,UA: YELLOW
SL AMB POCT KETONES,UA: NEGATIVE
SL AMB POCT NITRITE,UA: NEGATIVE
SL AMB POCT PH,UA: 6
SL AMB POCT SPECIFIC GRAVITY,UA: 1.02
SL AMB POCT URINE PROTEIN: ABNORMAL
SL AMB POCT UROBILINOGEN: 0.2

## 2018-11-26 PROCEDURE — 81003 URINALYSIS AUTO W/O SCOPE: CPT | Performed by: FAMILY MEDICINE

## 2018-11-26 PROCEDURE — 99213 OFFICE O/P EST LOW 20 MIN: CPT | Performed by: FAMILY MEDICINE

## 2018-11-26 RX ORDER — PNV NO.95/FERROUS FUM/FOLIC AC 28MG-0.8MG
1 TABLET ORAL DAILY
Qty: 90 TABLET | Refills: 0 | Status: ON HOLD | OUTPATIENT
Start: 2018-11-26 | End: 2019-03-31

## 2018-11-26 NOTE — PROGRESS NOTES
34 yo [id number] with obesity, h/o GDM and preeclampsia in previous pregnancies, at [facility] 18w5d by first trimester US presented to office for prenatal visit  Pt reports nausea is much better and she did not need much of vitamin B6  She reports today that she gets headaches sometimes, specially when she does not have coffee, per patient she used to drink coffee every day but she stopped it because of pregnancy and now she is getting withdrawal of caffeine  The headache goes away as soon as she has something with caffeine  Patient has no acute concerns today, [facility] and [facility] done, she was told that she has two fibroids, patient was very concerned about this finding  She denies any recent vaginal bleeding, discharge or any other complaint, has recently started to feel baby move  1  Gestational age 22 weeks 5 days  Continue prenatal vitamins, patient reported that she is not taking them at present, encouraged to start  Prenatal labs reviewed  Follow-up in 4 weeks  Continue to follow up with [facility] for repeat ultrasound at 20 weeks  Sequential screen negative    2 Obesity with history of GDM in previous pregnancy   A1c on  was 5 8, patient does have appointment scheduled with Diabetes Education Center, encouraged to keep up the appointment  Continue follow-up with  center    3  History of preeclampsia  Continue ASA 81 mg daily   protein creatinine ratio on  was 0 13, CMP and platelet count reviewed    4  Fibroid during pregnancy  Patient was noted to have 2 fibroids in her recent scan,larger is 3 1 x 3 0 x 4 6 cm myoma in the mid posterior fundus ,  discussed in great detail how fibroid can affect pregnancy during different trimester and at the time of delivery       Patient to follow up in 4 weeks

## 2018-12-11 ENCOUNTER — ROUTINE PRENATAL (OUTPATIENT)
Dept: PERINATAL CARE | Facility: CLINIC | Age: 33
End: 2018-12-11
Payer: COMMERCIAL

## 2018-12-11 ENCOUNTER — OFFICE VISIT (OUTPATIENT)
Dept: PERINATAL CARE | Facility: CLINIC | Age: 33
End: 2018-12-11
Payer: COMMERCIAL

## 2018-12-11 VITALS
SYSTOLIC BLOOD PRESSURE: 150 MMHG | HEIGHT: 64 IN | HEART RATE: 94 BPM | DIASTOLIC BLOOD PRESSURE: 62 MMHG | WEIGHT: 272.6 LBS | BODY MASS INDEX: 46.54 KG/M2

## 2018-12-11 VITALS
DIASTOLIC BLOOD PRESSURE: 62 MMHG | HEIGHT: 64 IN | HEART RATE: 94 BPM | SYSTOLIC BLOOD PRESSURE: 150 MMHG | BODY MASS INDEX: 46.54 KG/M2 | WEIGHT: 272.6 LBS

## 2018-12-11 DIAGNOSIS — O24.410 DIET CONTROLLED GESTATIONAL DIABETES MELLITUS (GDM) IN SECOND TRIMESTER: Primary | ICD-10-CM

## 2018-12-11 DIAGNOSIS — O34.11 UTERINE FIBROIDS AFFECTING PREGNANCY IN FIRST TRIMESTER: ICD-10-CM

## 2018-12-11 DIAGNOSIS — Z3A.20 20 WEEKS GESTATION OF PREGNANCY: ICD-10-CM

## 2018-12-11 DIAGNOSIS — D25.9 UTERINE FIBROIDS AFFECTING PREGNANCY IN FIRST TRIMESTER: ICD-10-CM

## 2018-12-11 DIAGNOSIS — Z36.86 ENCOUNTER FOR ANTENATAL SCREENING FOR CERVICAL LENGTH: ICD-10-CM

## 2018-12-11 DIAGNOSIS — O99.212 OBESITY AFFECTING PREGNANCY IN SECOND TRIMESTER: Primary | ICD-10-CM

## 2018-12-11 DIAGNOSIS — O24.410 DIET CONTROLLED GESTATIONAL DIABETES MELLITUS (GDM) IN SECOND TRIMESTER: ICD-10-CM

## 2018-12-11 PROBLEM — Z3A.18 18 WEEKS GESTATION OF PREGNANCY: Status: RESOLVED | Noted: 2018-11-26 | Resolved: 2018-12-11

## 2018-12-11 PROBLEM — O10.912 CHRONIC HYPERTENSION IN OBSTETRIC CONTEXT IN SECOND TRIMESTER: Status: ACTIVE | Noted: 2018-10-16

## 2018-12-11 PROCEDURE — 76817 TRANSVAGINAL US OBSTETRIC: CPT | Performed by: OBSTETRICS & GYNECOLOGY

## 2018-12-11 PROCEDURE — 76811 OB US DETAILED SNGL FETUS: CPT | Performed by: OBSTETRICS & GYNECOLOGY

## 2018-12-11 PROCEDURE — G0108 DIAB MANAGE TRN  PER INDIV: HCPCS | Performed by: DIETITIAN, REGISTERED

## 2018-12-11 PROCEDURE — 99212 OFFICE O/P EST SF 10 MIN: CPT | Performed by: OBSTETRICS & GYNECOLOGY

## 2018-12-11 RX ORDER — DIPHENHYDRAMINE HYDROCHLORIDE 25 MG/1
25 CAPSULE ORAL 2 TIMES DAILY
Refills: 0 | Status: ON HOLD | COMMUNITY
Start: 2018-12-02 | End: 2019-03-31

## 2018-12-11 NOTE — PROGRESS NOTES
The patient was seen today for an ultrasound  Please see ultrasound report (located under Ob Procedures) for additional details  Thank you very much for allowing us to participate in the care of this very nice patient  Should you have any questions, please do not hesitate to contact me  MD Manju Sanchez  Attending Physician, Giuseppe

## 2018-12-11 NOTE — PROGRESS NOTES
DATE:  18  RE: Viviane Dao    : 1985    SJ: Estimated Date of Delivery: 19    EGA: [id number]    Dear Drs  At [facility]: Thank you for referring your patient to the [facility]  at [address]  The patient received the following education for diabetes and pregnancy  Has a history of GDM with 4 other pregnancies with a history of a poor compliance to her GDM treatment plan   Patient denies a Type 2 diagnosis   Pathophysiology of diabetes and pregnancy  This includes maternal-fetal complications such as fetal macrosomia,  hypoglycemia, polyhydramnios, increased incidence of  section, pre-term labor and in severe cases, fetal demise and stillbirth   Self-monitoring of blood glucose levels: fasting (goal 60mg/dl to 90mg/dl) and two hours after the start of the meal less (goal less than 120mg/dl)  The patient was provided with an Accu-Chek Guide blood glucose meter and supplies   Weight gain during in pregnancy  Based on the patients height of 64 inches, pre-pregnancy weight of 265pounds (BMI 45 5), we would recommend a total weight gain of 11-20 pounds for the pregnancy  o The patients current weight is 124 kg (272 lb 9 6 oz) pounds, and her weight gain to date is 7 75 pounds   Medical Nutrition Therapy for Diabetes and Pregnancy:  o Basic review of macronutrients   o Meal pattern should consist of three small meals and three snacks daily  o Appropriate serving size of foods  o Incorporating protein at each meal and snack in the importance of protein in relationship to blood glucose control   o Individualized meal plan: 2100 calorie gestational diabetes diet  Stated she never followed her GDM diet in the past, just tried to limit amounts  Explained diet according to her normal eating habits  Patient is actually almost following a GDM diet without realizing it   Was advised to always have a bedtime snack & add protein to this snack  o Use of food diary to maintain a meal plan   Ultrasounds every 4 weeks at the [address] to evaluate fetal growth   Sick day guidelines   Breastfeeding guidelines   Post-partum diet recommendations   Exercise Guidelines   Report blood glucose levels to [address] [place], [place] [address]iers [facility] weekly or as directed  Miriam at the appointment, patient had a 54 random BG & stated she was developing symptoms; gave her 1 pack imra crackers  o Phone: [phone]  If no response in 24 hours, call [phone]   o Fax: [phone]  o Email: [email or web address]   Follow up: As Needed    Please contact our office at [phone] if you have questions  Time spent with patient 11 AM-12 noon; time spent face to face counseling greater than 50% of the appointment      Sincerely,     Sanford Esposito  Diabetes Educator  Diabetes and Pregnancy Program

## 2018-12-12 RX ORDER — LANCETS
EACH MISCELLANEOUS
Qty: 102 EACH | Refills: 6 | Status: SHIPPED | OUTPATIENT
Start: 2018-12-12 | End: 2019-05-24 | Stop reason: ALTCHOICE

## 2018-12-12 RX ORDER — BLOOD SUGAR DIAGNOSTIC
STRIP MISCELLANEOUS
Qty: 100 EACH | Refills: 6 | Status: SHIPPED | OUTPATIENT
Start: 2018-12-12 | End: 2019-04-07 | Stop reason: HOSPADM

## 2018-12-17 PROBLEM — Z3A.21 21 WEEKS GESTATION OF PREGNANCY: Status: ACTIVE | Noted: 2018-10-29

## 2018-12-27 ENCOUNTER — HOSPITAL ENCOUNTER (OUTPATIENT)
Facility: HOSPITAL | Age: 33
Discharge: HOME | End: 2018-12-27
Attending: FAMILY MEDICINE | Admitting: FAMILY MEDICINE
Payer: COMMERCIAL

## 2018-12-27 VITALS
DIASTOLIC BLOOD PRESSURE: 94 MMHG | BODY MASS INDEX: 46.44 KG/M2 | OXYGEN SATURATION: 98 % | RESPIRATION RATE: 17 BRPM | HEIGHT: 64 IN | WEIGHT: 272 LBS | TEMPERATURE: 98.5 F | HEART RATE: 92 BPM | SYSTOLIC BLOOD PRESSURE: 156 MMHG

## 2018-12-27 PROBLEM — Z3A.23 23 WEEKS GESTATION OF PREGNANCY: Status: ACTIVE | Noted: 2018-12-27

## 2018-12-27 PROCEDURE — 99213 OFFICE O/P EST LOW 20 MIN: CPT

## 2018-12-27 NOTE — PROGRESS NOTES
OB Triage Note  Kaye Cheung Sero 29 y o  female MRN: [id number]  Unit/Bed#: LD Triage      SJ: Estimated Date of Delivery: 19    HPI: Matthew Barnett is a 29 y o  [id number] at 23w1d who presents due to abdominal pain  She  complains of uterine contractions, occurring every vague, suprapubic abdominal pain, more like cramping than contractions, no LOF, and no VB  She states she has felt good FM  Pt denies any N/V, no diarrhea, still have good appetite  Pt has history of gestational diabetes and benign hypertension  Pt is diet controlled and most recent HbA1C in 18 was 5 8  Pt did report occasional headaches but does have a history of headache in her notes and has had occasional headache prior to pregnancy  Pt states she has uterine fibroids and think that is probably likely source of her pain  Pt has an appointment with Dr Renner coming up tomorrow  Pt understands it is important to keep that appointment  OB Cx: chronic HTN and h/o pre-eclampsia and GDM  FHT:   unable to obtain, FHR via ultrasound: 134  TOCO:   quiet    Physical Exam:  /94 (Patient Position: Sitting)   Pulse 92   Temp 98 5 °F (36 9 °C)   Resp 17   Ht 5' 4" (1 626 m)   Wt 123 kg (272 lb)   LMP  (LMP Unknown)   SpO2 98%   BMI 46 69 kg/m²   General: AAOX3  No acute distress   Abdominal: Soft  NT/ND  Gravid  : negative pooling, negative VB, os closed, negative discharge  Wet: no clue cells KOH: no evidence of yeast, no hyphy Ferning: negative    Urine dip: hugh: -, nit: -, Uro: -, protein: -  PH: 6 5, blood: moderate, S 030, Ketone: -, Poncho: -, Glu:-    A/P: Matthew Barnett is a [id number] at 23w1d here for rule out  labor  After evaluation, it is unlike that pt is in  labor  Fetal testing reassuring  Cervical US revealed cervical length: averaging 4cm  Discharge home with precautions  Instructions reviewed with patient  FM senior and Dr Mayberry aware      Signature/Title: Viral Cruz MD  Date: 12/27/2018  Time: 8:02 AM

## 2018-12-28 ENCOUNTER — ROUTINE PRENATAL (OUTPATIENT)
Dept: FAMILY MEDICINE CLINIC | Facility: CLINIC | Age: 33
End: 2018-12-28
Payer: COMMERCIAL

## 2018-12-28 VITALS — BODY MASS INDEX: 47.24 KG/M2 | DIASTOLIC BLOOD PRESSURE: 80 MMHG | WEIGHT: 275.2 LBS | SYSTOLIC BLOOD PRESSURE: 124 MMHG

## 2018-12-28 DIAGNOSIS — R10.30 LOWER ABDOMINAL PAIN: ICD-10-CM

## 2018-12-28 DIAGNOSIS — Z3A.23 23 WEEKS GESTATION OF PREGNANCY: ICD-10-CM

## 2018-12-28 DIAGNOSIS — O09.91 HIGH-RISK PREGNANCY IN FIRST TRIMESTER: Primary | ICD-10-CM

## 2018-12-28 LAB
BACTERIA UR QL AUTO: ABNORMAL /HPF
BILIRUB UR QL STRIP: NEGATIVE
CLARITY UR: ABNORMAL
COLOR UR: YELLOW
GLUCOSE UR STRIP-MCNC: ABNORMAL MG/DL
HGB UR QL STRIP.AUTO: ABNORMAL
HYALINE CASTS #/AREA URNS LPF: ABNORMAL /LPF
KETONES UR STRIP-MCNC: NEGATIVE MG/DL
LEUKOCYTE ESTERASE UR QL STRIP: NEGATIVE
NITRITE UR QL STRIP: NEGATIVE
NON-SQ EPI CELLS URNS QL MICRO: ABNORMAL /HPF
PH UR STRIP.AUTO: 7.5 [PH] (ref 4.5–8)
PROT UR STRIP-MCNC: NEGATIVE MG/DL
RBC #/AREA URNS AUTO: ABNORMAL /HPF
SL AMB  POCT GLUCOSE, UA: 500
SL AMB LEUKOCYTE ESTERASE,UA: ABNORMAL
SL AMB POCT BILIRUBIN,UA: ABNORMAL
SL AMB POCT BLOOD,UA: ABNORMAL
SL AMB POCT CLARITY,UA: ABNORMAL
SL AMB POCT COLOR,UA: YELLOW
SL AMB POCT KETONES,UA: ABNORMAL
SL AMB POCT NITRITE,UA: ABNORMAL
SL AMB POCT PH,UA: 7
SL AMB POCT SPECIFIC GRAVITY,UA: 1.01
SL AMB POCT URINE PROTEIN: ABNORMAL
SL AMB POCT UROBILINOGEN: 0.2
SP GR UR STRIP.AUTO: 1.01 (ref 1–1.03)
UROBILINOGEN UR QL STRIP.AUTO: 1 E.U./DL
WBC #/AREA URNS AUTO: ABNORMAL /HPF

## 2018-12-28 PROCEDURE — 81003 URINALYSIS AUTO W/O SCOPE: CPT | Performed by: FAMILY MEDICINE

## 2018-12-28 PROCEDURE — 99213 OFFICE O/P EST LOW 20 MIN: CPT | Performed by: FAMILY MEDICINE

## 2018-12-28 PROCEDURE — 81001 URINALYSIS AUTO W/SCOPE: CPT | Performed by: FAMILY MEDICINE

## 2018-12-28 PROCEDURE — 87086 URINE CULTURE/COLONY COUNT: CPT | Performed by: FAMILY MEDICINE

## 2018-12-28 NOTE — PROGRESS NOTES
34 yo [id number] with obesity, h/o GDM and preeclampsia in previous pregnancies, at [facility] 23w 2d presented to office for routine prenatal visit, patient was in the triage yesterday with complaints of lower abdominal pain, urine dip was normal, vaginal swabs did not show any infection,cervical US revealed cervical length of 4cm  Today patient still complains of dull suprapubic pain, and reports that she did throw up once yesterday  She reports that she has been feeling baby move, no vaginal bleeding, discharge, leakage  Patient is having a baby girl and planning to name her Phi Frederick  1  Gestational age 20 weeks 2days  Continue prenatal vitamins  Labor precautions reviewed  Encouraged to get the ultrasound done scheduled on 2019    2  History of preeclampsia  Continue low-dose aspirin daily  Patient did have blood pressure reading of 150/62 at  center, reports that blood pressure at the end of the visit was normal  Protein creatinine ratio on  was 0 13, CMP and platelet count reviewed    3  Pain lower abdomen  Workup at the triage was unremarkable, since patient has continuing pain Will send the urine for microscopic examination and culture    4   History of GDM  Continue follow-up with the diabetic center     Patient to follow up in 4 weeks

## 2018-12-29 LAB — BACTERIA UR CULT: NORMAL

## 2019-01-21 ENCOUNTER — OFFICE VISIT (OUTPATIENT)
Dept: PERINATAL CARE | Facility: CLINIC | Age: 34
End: 2019-01-21
Payer: COMMERCIAL

## 2019-01-21 ENCOUNTER — ULTRASOUND (OUTPATIENT)
Dept: PERINATAL CARE | Facility: CLINIC | Age: 34
End: 2019-01-21
Payer: COMMERCIAL

## 2019-01-21 VITALS
SYSTOLIC BLOOD PRESSURE: 127 MMHG | HEART RATE: 87 BPM | BODY MASS INDEX: 47.8 KG/M2 | HEIGHT: 64 IN | DIASTOLIC BLOOD PRESSURE: 85 MMHG | WEIGHT: 280 LBS

## 2019-01-21 DIAGNOSIS — O24.410 DIET CONTROLLED GESTATIONAL DIABETES MELLITUS (GDM) IN SECOND TRIMESTER: Primary | ICD-10-CM

## 2019-01-21 DIAGNOSIS — Z3A.26 26 WEEKS GESTATION OF PREGNANCY: ICD-10-CM

## 2019-01-21 DIAGNOSIS — O99.212 MATERNAL MORBID OBESITY IN SECOND TRIMESTER, ANTEPARTUM (HCC): Primary | ICD-10-CM

## 2019-01-21 DIAGNOSIS — D25.9 UTERINE FIBROIDS AFFECTING PREGNANCY IN FIRST TRIMESTER: ICD-10-CM

## 2019-01-21 DIAGNOSIS — O99.212 OBESITY AFFECTING PREGNANCY IN SECOND TRIMESTER: ICD-10-CM

## 2019-01-21 DIAGNOSIS — O24.410 DIET CONTROLLED GESTATIONAL DIABETES MELLITUS (GDM) IN SECOND TRIMESTER: ICD-10-CM

## 2019-01-21 DIAGNOSIS — E66.01 MATERNAL MORBID OBESITY IN SECOND TRIMESTER, ANTEPARTUM (HCC): Primary | ICD-10-CM

## 2019-01-21 DIAGNOSIS — O10.912 CHRONIC HYPERTENSION IN OBSTETRIC CONTEXT IN SECOND TRIMESTER: ICD-10-CM

## 2019-01-21 DIAGNOSIS — O34.11 UTERINE FIBROIDS AFFECTING PREGNANCY IN FIRST TRIMESTER: ICD-10-CM

## 2019-01-21 PROCEDURE — G0108 DIAB MANAGE TRN  PER INDIV: HCPCS

## 2019-01-21 PROCEDURE — 99212 OFFICE O/P EST SF 10 MIN: CPT | Performed by: OBSTETRICS & GYNECOLOGY

## 2019-01-21 PROCEDURE — 76816 OB US FOLLOW-UP PER FETUS: CPT | Performed by: OBSTETRICS & GYNECOLOGY

## 2019-01-21 NOTE — PROGRESS NOTES
DATE: 19   RE: Todd Galindo   : 1985  SJ: Estimated Date of Delivery: 19  EGA:  12Z3O    Dear Dr Osiel Gracia : Thank you for referring your patient to the Diabetes and Pregnancy Program at [address] [place] [address]  The patient was seen today for medical nutrition therapy for the treatment of diabetes during pregnancy  In addition to diabetes, the nutrition status is complicated by history of non-compliance with GDM in 5 prior pregnancies  The patient was here today for an ultrasound and Dr Serafin Anderson requested follow-up considering no reported blood sugar values since initial education appointment on 18  The ultrasound results per Dr Serafin Anderson noted fetus is measuring larger than gestational age  The following was reviewed with the patient:      Weight gain during in pregnancy  Based on the patients height of 64 inches, pre-pregnancy weight of 265 pounds (BMI 45 5) we would recommend a total weight gain of 11-20 pounds for the pregnancy  o The patients current weight is 279 pounds, and her weight gain to date is 14 4 pounds  Based on this, we are recommending the patient gain no more than approximately 6 pounds for the remainder of the pregnancy   Basic review of macronutrients   Meal pattern should consist of three small meals and three snacks daily   Carbohydrate gram amounts per meal    Instructions on how to read a food label   Appropriate serving size of foods   Incorporating protein at each meal and snack in the importance of protein in relationship to blood glucose control   Individualized meal plan: 2100 gestational diabetes diet   Diet history revealed patient has been overeating at breakfast and snacking throughout the day and eating a large dinner  Patient also admitted to snacking on foods not allowed on diet at night time prior bedtime   Post-partum diet recommendations     Prescriptions were ordered for HbA1c and CMP    Report blood glucose levels to [address] weekly or as directed  Patient reported that insurance did not allow 4 times per day testing  [address] was contacted and testing is now approved 4 times per day  Provided patient with 10 additional test strips to start testing again  Testing and reporting procedure was again reviewed  o Phone: [phone]  If no response in 24 hours, call [phone]   o Fax: [phone]  o Email: [email or web address]   Follow up: Friday, 1/24/19  Thank you for the opportunity to participate in the care of this patient  I can be reached at [phone] should you have any questions  Time spent with patient 30 minutes [phone]; time spent face to face counseling greater than 50% of the appointment      Sincerely,     Michelle Licea RD,LDN,CDE  Diabetes Educator  Diabetes and Pregnancy Program

## 2019-01-21 NOTE — PROGRESS NOTES
Please refer to the [facility] ultrasound report in Ob Procedures for additional information regarding the visit to the [facility]  today

## 2019-01-21 NOTE — LETTER
January 24, 2019     Jan Oropeza MD  [address]    Patient: Real Karyna   YOB: 1985   Date of Visit: 1/21/2019       Dear Dr Dae Patrick:    Thank you for referring Real Karyna to me for evaluation  Below are my notes for this consultation  If you have questions, please do not hesitate to call me  I look forward to following your patient along with you  Sincerely,        Ivy Ibrahim MD        CC: No Recipients  Ivy Ibrahim MD  1/21/2019  3:14 PM  Sign at close encounter  Please refer to the [facility] ultrasound report in Ob Procedures for additional information regarding the visit to the [facility]  today

## 2019-01-22 ENCOUNTER — TELEPHONE (OUTPATIENT)
Dept: PERINATAL CARE | Facility: CLINIC | Age: 34
End: 2019-01-22

## 2019-01-22 DIAGNOSIS — O24.410 DIET CONTROLLED GESTATIONAL DIABETES MELLITUS (GDM) IN SECOND TRIMESTER: Primary | ICD-10-CM

## 2019-01-31 ENCOUNTER — TELEPHONE (OUTPATIENT)
Dept: FAMILY MEDICINE CLINIC | Facility: CLINIC | Age: 34
End: 2019-01-31

## 2019-01-31 NOTE — TELEPHONE ENCOUNTER
----- Message from Nellie Shafer MD sent at 1/31/2019 11:01 AM EST -----  This is my prenatal patient Olaf Randolph [[id number]] who recently noshowed for her appointment , please have her schedule, thanks    Tameka Chery

## 2019-02-04 NOTE — TELEPHONE ENCOUNTER
3rd attempt left message for patient to give us a talk back regarding a prenatal care appointment  Thanks

## 2019-02-08 ENCOUNTER — ROUTINE PRENATAL (OUTPATIENT)
Dept: FAMILY MEDICINE CLINIC | Facility: CLINIC | Age: 34
End: 2019-02-08

## 2019-02-08 VITALS — SYSTOLIC BLOOD PRESSURE: 130 MMHG | BODY MASS INDEX: 49.06 KG/M2 | DIASTOLIC BLOOD PRESSURE: 82 MMHG | WEIGHT: 285.8 LBS

## 2019-02-08 DIAGNOSIS — Z11.1 PPD SCREENING TEST: ICD-10-CM

## 2019-02-08 DIAGNOSIS — Z3A.21 21 WEEKS GESTATION OF PREGNANCY: ICD-10-CM

## 2019-02-08 DIAGNOSIS — D25.9 UTERINE FIBROIDS AFFECTING PREGNANCY IN FIRST TRIMESTER: ICD-10-CM

## 2019-02-08 DIAGNOSIS — O34.11 UTERINE FIBROIDS AFFECTING PREGNANCY IN FIRST TRIMESTER: ICD-10-CM

## 2019-02-08 DIAGNOSIS — Z3A.29 29 WEEKS GESTATION OF PREGNANCY: Primary | ICD-10-CM

## 2019-02-08 DIAGNOSIS — O10.912 CHRONIC HYPERTENSION IN OBSTETRIC CONTEXT IN SECOND TRIMESTER: ICD-10-CM

## 2019-02-08 LAB
CREAT UR-MCNC: 135 MG/DL
PROT UR-MCNC: 42 MG/DL
PROT/CREAT UR: 0.31 MG/G{CREAT} (ref 0–0.1)
SL AMB  POCT GLUCOSE, UA: 1000
SL AMB LEUKOCYTE ESTERASE,UA: 1.02
SL AMB POCT BILIRUBIN,UA: NEGATIVE
SL AMB POCT BLOOD,UA: ABNORMAL
SL AMB POCT CLARITY,UA: ABNORMAL
SL AMB POCT COLOR,UA: YELLOW
SL AMB POCT KETONES,UA: 15
SL AMB POCT NITRITE,UA: NEGATIVE
SL AMB POCT PH,UA: 6
SL AMB POCT SPECIFIC GRAVITY,UA: 1.02
SL AMB POCT URINE PROTEIN: 30
SL AMB POCT UROBILINOGEN: 0.2

## 2019-02-08 PROCEDURE — 82570 ASSAY OF URINE CREATININE: CPT | Performed by: FAMILY MEDICINE

## 2019-02-08 PROCEDURE — 84156 ASSAY OF PROTEIN URINE: CPT | Performed by: FAMILY MEDICINE

## 2019-02-08 PROCEDURE — 99213 OFFICE O/P EST LOW 20 MIN: CPT | Performed by: FAMILY MEDICINE

## 2019-02-08 PROCEDURE — 90715 TDAP VACCINE 7 YRS/> IM: CPT | Performed by: FAMILY MEDICINE

## 2019-02-08 PROCEDURE — 90471 IMMUNIZATION ADMIN: CPT | Performed by: FAMILY MEDICINE

## 2019-02-08 PROCEDURE — 86580 TB INTRADERMAL TEST: CPT | Performed by: FAMILY MEDICINE

## 2019-02-08 PROCEDURE — 81003 URINALYSIS AUTO W/O SCOPE: CPT | Performed by: FAMILY MEDICINE

## 2019-02-09 NOTE — PROGRESS NOTES
32 yo [id number]  at [facility]  29w2d with obesity, h/o GDM and preeclampsia in previous pregnancies, fibroid uterus, renal stones s/p lithotripsy in 2018 presented to office for routine prenatal visit  Patient reports normal fetal movement, denies any bleeding, leakage of fluid, discharge but she does have occasional pain in right side of her abdomen which only lasts for seconds and goes away  Patient states that she did follow up with diabetic Center, and is following the diet that they recommended but her fasting is still in the range of 120s and postprandial between 110 and 120  She will follow up with  center on  and will also go to the diabetic Center at that time  Patient is not been sending her blood sugar logs, and I encouraged her to do so  1  Gestational age 33 weeks 2 days  Height of uterus today was 31 cm, ultrasound on 2019 at gestational age 29 weeks 5 days showed fetus with AGA 27w 1d with abdominal circumference 28 weeks 3 days  EFW at that time was 71%ile and SAMANTHA 21 7  Patient does have ultrasound scheduled on 2019, encouraged her to keep appointment  Also encouraged her to strictly follow diet recommended at the diabetic Center, and send her blood sugar logs weekly as advised  Stressed on importance of sugar control during pregnancy and complications related to it  Continue prenatal vitamins as advised  Labor precautions review  Lab work for CBC, RPR given  Tdap administered today    2  GDM  Encouraged her to continue sending sugar logs and follow diet  Continue follow-up with diabetic center  Would likely need fetal surveillance in 3rd trimester    3  History of preeclampsia  Continue low-dose aspirin daily  PC ratio on  was 0 13  Will repeat PC ratio, CMP given proteinuria in her urine today  Patient denies any headache, blurring of vision, pain right hypochondrium at this time    4   Pain lower abdomen  Ligament pain likely secondary to stretching with enlarging uterus, could also be secondary to stones in her kidney  Patient did have lithotripsy in July 2018 but did not follow up after that  Will continue to monitor at this time, but will consider referral to Urology if patient continues to have pain    Patient to follow up in 2 weeks

## 2019-02-19 ENCOUNTER — ULTRASOUND (OUTPATIENT)
Dept: PERINATAL CARE | Facility: CLINIC | Age: 34
End: 2019-02-19
Payer: COMMERCIAL

## 2019-02-19 ENCOUNTER — OFFICE VISIT (OUTPATIENT)
Dept: PERINATAL CARE | Facility: CLINIC | Age: 34
End: 2019-02-19
Payer: COMMERCIAL

## 2019-02-19 VITALS
BODY MASS INDEX: 49 KG/M2 | DIASTOLIC BLOOD PRESSURE: 84 MMHG | SYSTOLIC BLOOD PRESSURE: 128 MMHG | HEIGHT: 64 IN | RESPIRATION RATE: 18 BRPM | WEIGHT: 287 LBS

## 2019-02-19 VITALS
SYSTOLIC BLOOD PRESSURE: 128 MMHG | DIASTOLIC BLOOD PRESSURE: 84 MMHG | HEART RATE: 102 BPM | BODY MASS INDEX: 49 KG/M2 | HEIGHT: 64 IN | WEIGHT: 287 LBS

## 2019-02-19 DIAGNOSIS — O24.414 INSULIN CONTROLLED GESTATIONAL DIABETES MELLITUS (GDM) IN THIRD TRIMESTER: Primary | ICD-10-CM

## 2019-02-19 DIAGNOSIS — Z3A.30 30 WEEKS GESTATION OF PREGNANCY: ICD-10-CM

## 2019-02-19 PROBLEM — O10.912 CHRONIC HYPERTENSION IN OBSTETRIC CONTEXT IN SECOND TRIMESTER: Status: RESOLVED | Noted: 2018-10-16 | Resolved: 2019-02-19

## 2019-02-19 PROBLEM — O24.410 DIET CONTROLLED GESTATIONAL DIABETES MELLITUS (GDM) IN SECOND TRIMESTER: Status: RESOLVED | Noted: 2018-12-11 | Resolved: 2019-02-19

## 2019-02-19 PROCEDURE — G0108 DIAB MANAGE TRN  PER INDIV: HCPCS

## 2019-02-19 PROCEDURE — 76816 OB US FOLLOW-UP PER FETUS: CPT | Performed by: OBSTETRICS & GYNECOLOGY

## 2019-02-19 PROCEDURE — 99213 OFFICE O/P EST LOW 20 MIN: CPT | Performed by: OBSTETRICS & GYNECOLOGY

## 2019-02-19 RX ORDER — PEN NEEDLE, DIABETIC 30 GX3/16"
NEEDLE, DISPOSABLE MISCELLANEOUS
Qty: 100 EACH | Refills: 1 | Status: CANCELLED | OUTPATIENT
Start: 2019-02-19

## 2019-02-19 RX ORDER — INSULIN GLARGINE 100 [IU]/ML
INJECTION, SOLUTION SUBCUTANEOUS
Qty: 15 ML | Refills: 0 | Status: SHIPPED | OUTPATIENT
Start: 2019-02-19 | End: 2019-03-15 | Stop reason: SDUPTHER

## 2019-02-19 NOTE — PROGRESS NOTES
The patient was seen today for an ultrasound  Please see ultrasound report (located under Ob Procedures) for additional details  Thank you very much for allowing us to participate in the care of this very nice patient  Should you have any questions, please do not hesitate to contact me  Alejandro Corrales MD [facility]  Attending Physician, Giuseppe

## 2019-02-19 NOTE — PATIENT INSTRUCTIONS
Kick Counts in Pregnancy   AMBULATORY CARE:   Kick counts  measure how much your baby is moving in your womb  A kick from your baby can be felt as a twist, turn, swish, roll, or jab  It is common to feel your baby kicking at 26 to 28 weeks of pregnancy  You may feel your baby kick as early as 20 weeks of pregnancy  Seek care immediately if:   · You feel your baby kick less as the day goes on      · You do not feel any kicks in a day  Contact your healthcare provider if:   · You feel a change in the number of kicks or movements of your baby  · You feel fewer than 10 kicks within 2 hours after counting  · You have questions or concerns about your baby's movements  Why measure kick counts:  Your baby's movement may provide information about your baby's health  He may move less, or not at all, if there are problems  He may move less if he does not have enough room to grow in your uterus (womb)  He may also move less if he is not getting enough oxygen or nutrition from the placenta  Tell your healthcare provider as soon as you feel a change in your baby's movements  Problems that are found earlier are easier to treat  When to measure kick counts:   · Measure kick counts at the same time every day  · Measure kick counts when your baby is awake and most active  Your baby may be most active in the evening  · Measure kick counts after a meal or snack or when your baby is usually most active  Your baby may be more active after you eat or in the evening  · You should not smoke while you are pregnant  Smoking increases the risk of health problems for you and for your baby during your pregnancy  If you do smoke, wait 2 hours to measure kick counts  Nicotine can make your baby more active than usual   How to measure kick counts:  Check that your baby is awake before you measure kick counts  You can wake up your baby by lightly pushing on your belly, walking, or drinking something cold   Your healthcare provider may tell you different ways to measure kick counts  He/She may tell you to do the following:  · Use a chart or clock to keep track of the time you start and finish counting  · Sit in a chair or lie on your left side  · Place your hands on the largest part of your belly  · Count until you reach 10 kicks  Write down how much time it takes to count 10 kicks  · It may take 30 minutes to 2 hours to count 10 kicks  It should not take more than 2 hours to count 10 kicks  If you do not feel 10 kicks within 2 hours, Call your Obstetrician    Follow up with your healthcare provider as directed:  Write down your questions so you remember to ask them during your visits  © 2017 [address] Information is for End User's use only and may not be sold, redistributed or otherwise used for commercial purposes  All illustrations and images included in CareNotes® are the copyrighted property of A D A M , Inc  or Francis Hernandez  The above information is an  only  It is not intended as medical advice for individual conditions or treatments  Talk to your doctor, nurse or pharmacist before following any medical regimen to see if it is safe and effective for you

## 2019-02-19 NOTE — PROGRESS NOTES
DATE: 19   RE: Analisa Young   : 1985  SJ: Estimated Date of Delivery: 19  EGA:  30w6d  Dr Checo Vaughn     Your patient was seen in the office today for insulin teaching  Patient at [facility] today for ultrasound  Dr Renny Rosales requested patient receive insulin administration education and initiate insulin today  Height:  64 inches  Weight:  287 lbs    The patient was instructed on the following:     KwikPen use, Initiate Basaglar 35 units at 2100   Insulin administration times, insulin action   Hypoglycemia signs, symptoms and treatment   Increase in maternal-fetal surveillance with insulin initiation   Side effects of hyperglycemia in pregnancy including macrosomia,  hypoglycemia, polyhydramnios, pre-term labor and stillbirth   Continue to monitor blood glucoses via fingerstick fasting (goal 60 mg/dl to 90 mg/dl) and two hours post prandial (goal less than 120 mg/dl)   Follow up with our office on Friday morning, 19 for evaluation of blood glucose levels   Non-stress testing two times weekly and SAMANTHA testing beginning at 32 weeks gestation   Ultrasounds every 4 weeks at the [address]   HbA1c every 6 to 8 weeks, CMP ordered on 19, patient advised to complete today prior to leaving the hospital     Thank you for the opportunity to participate in the care of this patient  I can be reached at [phone] should you have any questions  Time spent with patient [phone]; time spent face to face counseling greater than 50% of the appointment      Brigitte Tran, RN  Diabetes Educator  Diabetes and Pregnancy Program

## 2019-02-25 ENCOUNTER — PATIENT OUTREACH (OUTPATIENT)
Dept: FAMILY MEDICINE CLINIC | Facility: CLINIC | Age: 34
End: 2019-02-25

## 2019-02-25 ENCOUNTER — TELEPHONE (OUTPATIENT)
Dept: FAMILY MEDICINE CLINIC | Facility: CLINIC | Age: 34
End: 2019-02-25

## 2019-02-25 ENCOUNTER — DOCUMENTATION (OUTPATIENT)
Dept: FAMILY MEDICINE CLINIC | Facility: CLINIC | Age: 34
End: 2019-02-25

## 2019-02-26 ENCOUNTER — OFFICE VISIT (OUTPATIENT)
Dept: PERINATAL CARE | Facility: CLINIC | Age: 34
End: 2019-02-26
Payer: COMMERCIAL

## 2019-02-26 DIAGNOSIS — O40.3XX0 POLYHYDRAMNIOS AFFECTING PREGNANCY IN THIRD TRIMESTER: Primary | ICD-10-CM

## 2019-02-26 DIAGNOSIS — O24.414 INSULIN CONTROLLED GESTATIONAL DIABETES MELLITUS (GDM) IN THIRD TRIMESTER: ICD-10-CM

## 2019-02-26 DIAGNOSIS — O34.11 UTERINE FIBROIDS AFFECTING PREGNANCY IN FIRST TRIMESTER: ICD-10-CM

## 2019-02-26 DIAGNOSIS — Z3A.31 31 WEEKS GESTATION OF PREGNANCY: ICD-10-CM

## 2019-02-26 DIAGNOSIS — D25.9 UTERINE FIBROIDS AFFECTING PREGNANCY IN FIRST TRIMESTER: ICD-10-CM

## 2019-02-26 PROCEDURE — 76818 FETAL BIOPHYS PROFILE W/NST: CPT | Performed by: OBSTETRICS & GYNECOLOGY

## 2019-02-26 NOTE — PATIENT INSTRUCTIONS
fet  Kick Counts in Pregnancy   AMBULATORY CARE:   Kick counts  measure how much your baby is moving in your womb  A kick from your baby can be felt as a twist, turn, swish, roll, or jab  It is common to feel your baby kicking at 26 to 28 weeks of pregnancy  You may feel your baby kick as early as 20 weeks of pregnancy  Seek care immediately if:   · You feel your baby kick less as the day goes on      · You do not feel any kicks in a day  Contact your healthcare provider if:   · You feel a change in the number of kicks or movements of your baby  · You feel fewer than 10 kicks within 2 hours after counting twice  · You have questions or concerns about your baby's movements  Why measure kick counts:  Your baby's movement may provide information about your baby's health  He may move less, or not at all, if there are problems  He may move less if he does not have enough room to grow in your uterus (womb)  He may also move less if he is not getting enough oxygen or nutrition from the placenta  Tell your healthcare provider as soon as you feel a change in your baby's movements  Problems that are found earlier are easier to treat  When to measure kick counts:   · Measure kick counts at the same time every day  · Measure kick counts when your baby is awake and most active  Your baby may be most active in the evening  · Measure kick counts after a meal or snack  Your baby may be more active after you eat  Wait 2 hours after you drink liquids that contain caffeine  Caffeine can make your baby more active than usual     · You should not smoke while you are pregnant  Smoking increases the risk of health problems for you and for your baby during your pregnancy  If you do smoke, wait 2 hours to measure kick counts  Nicotine can make your baby more active than usual   How to measure kick counts:  Check that your baby is awake before you measure kick counts   You can wake up your baby by lightly pushing on your belly, walking, or drinking something cold  Your healthcare provider may tell you different ways to measure kick counts  He may tell you to do the following:  · Use a chart or clock to keep track of the time you start and finish counting  · Sit in a chair or lie on your left side  · Place your hands on the largest part of your belly  · Count until you reach 10 kicks  Write down how much time it takes to count 10 kicks  · It may take 30 minutes to 2 hours to count 10 kicks  It should not take more than 2 hours to count 10 kicks  · If you do not feel 10 kicks within 2 hours, wait 1 hour and count again  Your baby can sleep for up to 40 minutes at one time  Follow up with your healthcare provider as directed:  Write down your questions so you remember to ask them during your visits  © 2017 [address] Information is for End User's use only and may not be sold, redistributed or otherwise used for commercial purposes  All illustrations and images included in CareNotes® are the copyrighted property of A D A M , Inc  or Francis Hernandez  The above information is an  only  It is not intended as medical advice for individual conditions or treatments  Talk to your doctor, nurse or pharmacist before following any medical regimen to see if it is safe and effective for you

## 2019-02-26 NOTE — TELEPHONE ENCOUNTER
Kaye  has a high risk pregnancy and needs to be seen every 2 weeks, she was last seen on 2/8, I don't see any further scheduled appointments  Please have her schedule   Thank you

## 2019-03-01 ENCOUNTER — TELEPHONE (OUTPATIENT)
Dept: FAMILY MEDICINE CLINIC | Facility: CLINIC | Age: 34
End: 2019-03-01

## 2019-03-01 NOTE — TELEPHONE ENCOUNTER
----- Message from Phyllis Rahman MD sent at 2/21/2019 12:58 PM EST -----  This is my prenatal, Viviane Dao, recently put on insulin for her GDM, she was supposed to see me in 2 weeks, but I do not see any appointment scheduled  Please have for see me every 2 weeks until 36 weeks and then weekly   Thank you    Yuki

## 2019-03-05 PROBLEM — O40.3XX0 POLYHYDRAMNIOS AFFECTING PREGNANCY IN THIRD TRIMESTER: Status: ACTIVE | Noted: 2019-03-05

## 2019-03-05 NOTE — TELEPHONE ENCOUNTER
2nd Attempt to reach patient left message and no call back,I have created a letter and mailed it to patients home for her to set up an appointment for prenatal care  Thank you in advance

## 2019-03-05 NOTE — TELEPHONE ENCOUNTER
Tried calling, left message to call back   Would someone try to call patient after 5:00, as I think she works during day

## 2019-03-07 NOTE — TELEPHONE ENCOUNTER
4th Attempt     When I call it goes straight to voice mail left message  A letter was mailed out 2 days ago I also notice the patient has a few pending appointments to [facility] for 03/08,03/12,03/15,03/19 and 03/22  Thanks

## 2019-03-08 ENCOUNTER — TELEPHONE (OUTPATIENT)
Dept: FAMILY MEDICINE CLINIC | Facility: CLINIC | Age: 34
End: 2019-03-08

## 2019-03-08 ENCOUNTER — OFFICE VISIT (OUTPATIENT)
Dept: PERINATAL CARE | Facility: CLINIC | Age: 34
End: 2019-03-08
Payer: COMMERCIAL

## 2019-03-08 VITALS
WEIGHT: 289.6 LBS | DIASTOLIC BLOOD PRESSURE: 84 MMHG | SYSTOLIC BLOOD PRESSURE: 150 MMHG | HEIGHT: 64 IN | HEART RATE: 101 BPM | BODY MASS INDEX: 49.44 KG/M2

## 2019-03-08 DIAGNOSIS — O99.210 MATERNAL MORBID OBESITY, ANTEPARTUM (HCC): Primary | ICD-10-CM

## 2019-03-08 DIAGNOSIS — O24.414 INSULIN CONTROLLED GESTATIONAL DIABETES MELLITUS (GDM) IN THIRD TRIMESTER: ICD-10-CM

## 2019-03-08 DIAGNOSIS — D25.9 UTERINE FIBROIDS AFFECTING PREGNANCY IN FIRST TRIMESTER: ICD-10-CM

## 2019-03-08 DIAGNOSIS — O34.11 UTERINE FIBROIDS AFFECTING PREGNANCY IN FIRST TRIMESTER: ICD-10-CM

## 2019-03-08 DIAGNOSIS — E66.01 MATERNAL MORBID OBESITY, ANTEPARTUM (HCC): Primary | ICD-10-CM

## 2019-03-08 DIAGNOSIS — Z3A.33 33 WEEKS GESTATION OF PREGNANCY: ICD-10-CM

## 2019-03-08 PROCEDURE — 76815 OB US LIMITED FETUS(S): CPT | Performed by: OBSTETRICS & GYNECOLOGY

## 2019-03-08 PROCEDURE — 59025 FETAL NON-STRESS TEST: CPT | Performed by: OBSTETRICS & GYNECOLOGY

## 2019-03-08 NOTE — TELEPHONE ENCOUNTER
Patient called back and I was able to get her in for 03/12 for prenatal care with Dr Zurdo Olivarez

## 2019-03-08 NOTE — TELEPHONE ENCOUNTER
Elizabeth Cedeño from [facility] calling pt had NST done there today - they wanted to let Dr Cecily Gonzalez know pt had high BP today, they also spoke to her about getting the bloodwork done that we had ordered  We have been trying to contact pt for some time now and they gave me an updated phone #  Pt told them she would be getting them done, she also has a f/u appt 3/12 with Dr Cecily Gonzalez

## 2019-03-08 NOTE — PROGRESS NOTES
Ms Yifan Parker is a  30 yo  at 35 2/7  weeks gestation who presents for a fetal ultrasound examination for evaluation of SAMANTHA and NST  Hx of  Obesity A2GDM Polyhydramnios   She is asymptomatic  Elevated B/P today  See Ob procedures  1  SAMANTHA  23 6  2  NST  Reactive  Recommendations;  Called your office and labs are ordered to rule out preeclampsia to be followed by your office  1  Twice a week NSTs, once a week SAMANTHA  2  Fetal movement counts    3  Follow-up ultrasound for growth in 3 weeks    Jerald Whitaker MD

## 2019-03-13 ENCOUNTER — APPOINTMENT (OUTPATIENT)
Dept: LAB | Facility: HOSPITAL | Age: 34
End: 2019-03-13
Payer: COMMERCIAL

## 2019-03-13 ENCOUNTER — TELEPHONE (OUTPATIENT)
Dept: FAMILY MEDICINE CLINIC | Facility: CLINIC | Age: 34
End: 2019-03-13

## 2019-03-13 ENCOUNTER — OFFICE VISIT (OUTPATIENT)
Dept: PERINATAL CARE | Facility: CLINIC | Age: 34
End: 2019-03-13
Payer: COMMERCIAL

## 2019-03-13 VITALS
DIASTOLIC BLOOD PRESSURE: 70 MMHG | SYSTOLIC BLOOD PRESSURE: 142 MMHG | BODY MASS INDEX: 49.51 KG/M2 | HEART RATE: 89 BPM | WEIGHT: 290 LBS | HEIGHT: 64 IN

## 2019-03-13 DIAGNOSIS — O24.414 INSULIN CONTROLLED GESTATIONAL DIABETES MELLITUS (GDM) IN THIRD TRIMESTER: ICD-10-CM

## 2019-03-13 DIAGNOSIS — Z3A.29 29 WEEKS GESTATION OF PREGNANCY: ICD-10-CM

## 2019-03-13 DIAGNOSIS — Z3A.21 21 WEEKS GESTATION OF PREGNANCY: ICD-10-CM

## 2019-03-13 DIAGNOSIS — I10 HYPERTENSION, BENIGN: ICD-10-CM

## 2019-03-13 DIAGNOSIS — Z3A.34 34 WEEKS GESTATION OF PREGNANCY: Primary | ICD-10-CM

## 2019-03-13 LAB
ALBUMIN SERPL BCP-MCNC: 2.4 G/DL (ref 3.5–5)
ALP SERPL-CCNC: 192 U/L (ref 46–116)
ALT SERPL W P-5'-P-CCNC: 21 U/L (ref 12–78)
ANION GAP SERPL CALCULATED.3IONS-SCNC: 9 MMOL/L (ref 4–13)
AST SERPL W P-5'-P-CCNC: 18 U/L (ref 5–45)
BASOPHILS # BLD AUTO: 0.02 THOUSANDS/ΜL (ref 0–0.1)
BASOPHILS NFR BLD AUTO: 0 % (ref 0–1)
BILIRUB SERPL-MCNC: 0.21 MG/DL (ref 0.2–1)
BUN SERPL-MCNC: 6 MG/DL (ref 5–25)
CALCIUM SERPL-MCNC: 8.8 MG/DL (ref 8.3–10.1)
CHLORIDE SERPL-SCNC: 107 MMOL/L (ref 100–108)
CO2 SERPL-SCNC: 23 MMOL/L (ref 21–32)
CREAT SERPL-MCNC: 0.42 MG/DL (ref 0.6–1.3)
CREAT UR-MCNC: 75.8 MG/DL
EOSINOPHIL # BLD AUTO: 0.02 THOUSAND/ΜL (ref 0–0.61)
EOSINOPHIL NFR BLD AUTO: 0 % (ref 0–6)
ERYTHROCYTE [DISTWIDTH] IN BLOOD BY AUTOMATED COUNT: 15 % (ref 11.6–15.1)
EST. AVERAGE GLUCOSE BLD GHB EST-MCNC: 140 MG/DL
GFR SERPL CREATININE-BSD FRML MDRD: 136 ML/MIN/1.73SQ M
GLUCOSE P FAST SERPL-MCNC: 160 MG/DL (ref 65–99)
HBA1C MFR BLD: 6.5 % (ref 4.2–6.3)
HCT VFR BLD AUTO: 35.6 % (ref 34.8–46.1)
HGB BLD-MCNC: 11.2 G/DL (ref 11.5–15.4)
IMM GRANULOCYTES # BLD AUTO: 0.08 THOUSAND/UL (ref 0–0.2)
IMM GRANULOCYTES NFR BLD AUTO: 1 % (ref 0–2)
LYMPHOCYTES # BLD AUTO: 1.04 THOUSANDS/ΜL (ref 0.6–4.47)
LYMPHOCYTES NFR BLD AUTO: 13 % (ref 14–44)
MCH RBC QN AUTO: 25.7 PG (ref 26.8–34.3)
MCHC RBC AUTO-ENTMCNC: 31.5 G/DL (ref 31.4–37.4)
MCV RBC AUTO: 82 FL (ref 82–98)
MONOCYTES # BLD AUTO: 0.39 THOUSAND/ΜL (ref 0.17–1.22)
MONOCYTES NFR BLD AUTO: 5 % (ref 4–12)
NEUTROPHILS # BLD AUTO: 6.74 THOUSANDS/ΜL (ref 1.85–7.62)
NEUTS SEG NFR BLD AUTO: 81 % (ref 43–75)
NRBC BLD AUTO-RTO: 0 /100 WBCS
PLATELET # BLD AUTO: 262 THOUSANDS/UL (ref 149–390)
PMV BLD AUTO: 11 FL (ref 8.9–12.7)
POTASSIUM SERPL-SCNC: 3.6 MMOL/L (ref 3.5–5.3)
PROT SERPL-MCNC: 6.2 G/DL (ref 6.4–8.2)
PROT UR-MCNC: 20 MG/DL
PROT/CREAT UR: 0.26 MG/G{CREAT} (ref 0–0.1)
RBC # BLD AUTO: 4.35 MILLION/UL (ref 3.81–5.12)
RPR SER QL: NORMAL
SODIUM SERPL-SCNC: 139 MMOL/L (ref 136–145)
WBC # BLD AUTO: 8.29 THOUSAND/UL (ref 4.31–10.16)

## 2019-03-13 PROCEDURE — 84156 ASSAY OF PROTEIN URINE: CPT | Performed by: FAMILY MEDICINE

## 2019-03-13 PROCEDURE — 59025 FETAL NON-STRESS TEST: CPT | Performed by: OBSTETRICS & GYNECOLOGY

## 2019-03-13 PROCEDURE — 76815 OB US LIMITED FETUS(S): CPT | Performed by: OBSTETRICS & GYNECOLOGY

## 2019-03-13 PROCEDURE — 85025 COMPLETE CBC W/AUTO DIFF WBC: CPT

## 2019-03-13 PROCEDURE — 82570 ASSAY OF URINE CREATININE: CPT | Performed by: FAMILY MEDICINE

## 2019-03-13 PROCEDURE — 36415 COLL VENOUS BLD VENIPUNCTURE: CPT

## 2019-03-13 PROCEDURE — 86592 SYPHILIS TEST NON-TREP QUAL: CPT

## 2019-03-13 PROCEDURE — 80053 COMPREHEN METABOLIC PANEL: CPT

## 2019-03-13 PROCEDURE — 83036 HEMOGLOBIN GLYCOSYLATED A1C: CPT

## 2019-03-13 NOTE — PROGRESS NOTES
Per Dr Lyle Rico at [facility] notified that Kaye had 2 high blood pressure reading here today and some visual changes   She had labs drawn today and Dr Laci Wiley would like her OB to review her labs with her

## 2019-03-13 NOTE — TELEPHONE ENCOUNTER
[facility] called to let us know blood work results are in chart and would like for Dr Teena Meadows to call patient with results

## 2019-03-14 ENCOUNTER — TELEPHONE (OUTPATIENT)
Dept: FAMILY MEDICINE CLINIC | Facility: CLINIC | Age: 34
End: 2019-03-14

## 2019-03-14 DIAGNOSIS — D50.8 IRON DEFICIENCY ANEMIA SECONDARY TO INADEQUATE DIETARY IRON INTAKE: Primary | ICD-10-CM

## 2019-03-14 RX ORDER — DOCUSATE SODIUM 100 MG/1
100 CAPSULE, LIQUID FILLED ORAL 2 TIMES DAILY
Qty: 60 CAPSULE | Refills: 1 | Status: SHIPPED | OUTPATIENT
Start: 2019-03-14 | End: 2019-08-14 | Stop reason: ALTCHOICE

## 2019-03-14 RX ORDER — FERROUS SULFATE TAB EC 324 MG (65 MG FE EQUIVALENT) 324 (65 FE) MG
324 TABLET DELAYED RESPONSE ORAL
Qty: 60 TABLET | Refills: 1 | Status: SHIPPED | OUTPATIENT
Start: 2019-03-14 | End: 2019-08-14 | Stop reason: ALTCHOICE

## 2019-03-14 NOTE — TELEPHONE ENCOUNTER
Spoke to patient regarding her lab results and multiple missed appointments  She said her car had broken down and that is why she could not make it to her appointment   Once again discussed at length complications of GDM and hypertension during pregnancy  Made her aware that platelet count is 874, LFTs are normal, and P/C ratio is 0 26, but her  A1c is now 6 5, and sugar in CMP is 160  With these levels she should be on insulin  She revealed that she has not started insulin yet but will go to diabetic center tommorow  Encouraged her to do so and follow diet  Pt assured that she will make it to appointment tomorrow to discuss further   Also added ferrous sulfate and colace for her Hb of 11 2

## 2019-03-15 ENCOUNTER — DOCUMENTATION (OUTPATIENT)
Dept: PERINATAL CARE | Facility: CLINIC | Age: 34
End: 2019-03-15

## 2019-03-15 ENCOUNTER — ROUTINE PRENATAL (OUTPATIENT)
Dept: FAMILY MEDICINE CLINIC | Facility: CLINIC | Age: 34
End: 2019-03-15

## 2019-03-15 ENCOUNTER — OFFICE VISIT (OUTPATIENT)
Dept: PERINATAL CARE | Facility: CLINIC | Age: 34
End: 2019-03-15
Payer: COMMERCIAL

## 2019-03-15 VITALS — SYSTOLIC BLOOD PRESSURE: 130 MMHG | DIASTOLIC BLOOD PRESSURE: 90 MMHG | BODY MASS INDEX: 49.61 KG/M2 | WEIGHT: 289 LBS

## 2019-03-15 VITALS
HEIGHT: 64 IN | SYSTOLIC BLOOD PRESSURE: 133 MMHG | DIASTOLIC BLOOD PRESSURE: 78 MMHG | WEIGHT: 288.6 LBS | BODY MASS INDEX: 49.27 KG/M2

## 2019-03-15 DIAGNOSIS — E66.01 MATERNAL MORBID OBESITY, ANTEPARTUM (HCC): Primary | ICD-10-CM

## 2019-03-15 DIAGNOSIS — Z3A.21 21 WEEKS GESTATION OF PREGNANCY: ICD-10-CM

## 2019-03-15 DIAGNOSIS — O99.213 OBESITY AFFECTING PREGNANCY IN THIRD TRIMESTER: ICD-10-CM

## 2019-03-15 DIAGNOSIS — D25.9 UTERINE FIBROIDS AFFECTING PREGNANCY IN FIRST TRIMESTER: ICD-10-CM

## 2019-03-15 DIAGNOSIS — O24.414 INSULIN CONTROLLED GESTATIONAL DIABETES MELLITUS (GDM) IN THIRD TRIMESTER: ICD-10-CM

## 2019-03-15 DIAGNOSIS — O24.414 INSULIN CONTROLLED GESTATIONAL DIABETES MELLITUS (GDM) IN THIRD TRIMESTER: Primary | ICD-10-CM

## 2019-03-15 DIAGNOSIS — O99.210 MATERNAL MORBID OBESITY, ANTEPARTUM (HCC): Primary | ICD-10-CM

## 2019-03-15 DIAGNOSIS — Z3A.34 34 WEEKS GESTATION OF PREGNANCY: ICD-10-CM

## 2019-03-15 DIAGNOSIS — O13.3 GESTATIONAL HYPERTENSION, THIRD TRIMESTER: Primary | ICD-10-CM

## 2019-03-15 DIAGNOSIS — O34.11 UTERINE FIBROIDS AFFECTING PREGNANCY IN FIRST TRIMESTER: ICD-10-CM

## 2019-03-15 DIAGNOSIS — Z3A.30 30 WEEKS GESTATION OF PREGNANCY: ICD-10-CM

## 2019-03-15 PROBLEM — O40.3XX0 POLYHYDRAMNIOS AFFECTING PREGNANCY IN THIRD TRIMESTER: Status: RESOLVED | Noted: 2019-03-05 | Resolved: 2019-03-15

## 2019-03-15 LAB
SL AMB  POCT GLUCOSE, UA: 1000
SL AMB LEUKOCYTE ESTERASE,UA: ABNORMAL
SL AMB POCT BILIRUBIN,UA: ABNORMAL
SL AMB POCT BLOOD,UA: ABNORMAL
SL AMB POCT CLARITY,UA: CLEAR
SL AMB POCT COLOR,UA: ABNORMAL
SL AMB POCT KETONES,UA: ABNORMAL
SL AMB POCT NITRITE,UA: ABNORMAL
SL AMB POCT PH,UA: 6
SL AMB POCT SPECIFIC GRAVITY,UA: 1.02
SL AMB POCT URINE PROTEIN: ABNORMAL
SL AMB POCT UROBILINOGEN: ABNORMAL

## 2019-03-15 PROCEDURE — G0108 DIAB MANAGE TRN  PER INDIV: HCPCS | Performed by: DIETITIAN, REGISTERED

## 2019-03-15 PROCEDURE — 76815 OB US LIMITED FETUS(S): CPT | Performed by: OBSTETRICS & GYNECOLOGY

## 2019-03-15 PROCEDURE — 81002 URINALYSIS NONAUTO W/O SCOPE: CPT | Performed by: FAMILY MEDICINE

## 2019-03-15 PROCEDURE — 99213 OFFICE O/P EST LOW 20 MIN: CPT | Performed by: FAMILY MEDICINE

## 2019-03-15 PROCEDURE — 59025 FETAL NON-STRESS TEST: CPT | Performed by: OBSTETRICS & GYNECOLOGY

## 2019-03-15 NOTE — PROGRESS NOTES
Date:  03/15/19  RE: Tadeo Patricio    : 1985  Estimated Date of Delivery: 19  EGA: 34w2d  OB/GYN: [facility]  Insulin controlled gestational diabetes      Date Fasting Post-  breakfast Post-  lunch Post-  dinner Before bedtime Stephanie Comments                                                     Has not tested her BG for months per patient  Current regimen:  Basaglar--35 units at bedtime  Admits to never beginning insulin as was recommended at 19 insulin instruction appointment  2100 calorie GDM diet with 3 meals & 3 snacks  States she is not following the diet  Dines out daily for breakfast which is her best meal   Self-Blood Glucose monitoring 4 times daily which patient is not testing at all  Plan:  Begin 35 units Basaglar  Prescriptions were resent to her pharmacy  Patient now agreed to take insulin for 2 weeks when delivery is planned per her physician's recommendation  Agreed to make these diet changes: refused to decrease amount at breakfast as is already avoiding toast--eats 1 cup home fries instead but eats 4-5 oz protein  Agreed to change bedtime snack to 1 cup dry cereal (some may be sweetened) from 2 cups & eat small handful nuts or 1 tbsp peanut butter  Is currently eating proper amounts at lunch,  dinner & snacks  Agreed to test BG 2 times daily--FBS & 2 hrs p breakfast   To report her BG twice weekly when at [facility] center for antepartum monitoring  3/13/19 ByU9c-0 5%; increased from 5 8% on 18 growth ultrasound indicated appropriate growth for gestational age, but AC was 80%  SAMANTHA today shows normal fluid  Date due to report next:  Tuesday, 3/19/19 when at [place] [address] for antepartum testing      Derek Rojas  Diabetes Educator   Diabetes and Pregnancy Program

## 2019-03-15 NOTE — PROGRESS NOTES
DATE: 03/15/19   RE: Viviane Dao   : 1985  SJ: Estimated Date of Delivery: 19  EGA:  34w2d    Dear Cheng  At [address]: Thank you for referring your patient to the Diabetes and Pregnancy Program at [address]  The patient was seen today for medical nutrition therapy re-evaluation for the treatment of diabetes during pregnancy  In addition to diabetes, the nutrition status is complicated by morbid obesity & noncompliant to diabetes treatment  The following was reviewed with the patient:      Weight gain during in pregnancy  Based on the patients height of   inches, pre-pregnancy weight of 265 pounds (BMI 45 5), we would recommend a total weight gain of 11-20 pounds for the pregnancy  o The patients current weight is 288  5  pounds, and her weight gain to date is 23 pounds  Based on this, we are recommending the patient maintain her weight for the remainder of the pregnancy   Basic review of macronutrients   Meal pattern should consist of three small meals and three snacks daily  Eats 3 meals & snacks throughout the day  Dines out or breakfast daily & enjoys a large breakfast  Eats an average sized lunch & dinner  Eats a 2 cups dry sweetened cereal with milk at bedtime   Appropriate serving size of foods   Incorporating protein at each meal and snack in the importance of protein in relationship to blood glucose control   Individualized meal plan: 2100 calorie gestational diabetes diet  Eats a large breakfast, which she already decreased by avoiding toast & eats 1 cup home fries instead  Eats a meat  & 2 eggs also  Refused to decrease breakfast amounts more  Eats lunch & dinner according to the meal plan  Snacks on small apples & peanut butter  Agreed to change bedtime snack to 1 cup dry sweetened cereal & add nuts or 1 tbsp peanut butter to lower FBS   Post-partum diet recommendations     Report blood glucose levels to [facility] weekly or as directed  Has not reported BG since initial diabetes instruction on   12/11/18  States her FBS is high  Prefers not to test BG  Agreed to test her BG 2 times daily instead of 4 times  Will test at FBS & 2 hours p breakfast      Never started the 35 units Basaglar at bedtime  Now, agreed to begin insulin due to her physician recommendation  Stated she is to deliver in 2 weeks  Had insulin instruction on 2/19/19     o Phone: [phone]  If no response in 24 hours, call [phone]   o Fax: [phone]  o Email: [email or web address]   Follow up: twice weekly when has antepartum testing to discuss BG results & insulin adjustmenets  Thank you for the opportunity to participate in the care of this patient  I can be reached at [phone] should you have any questions  Time spent with patient 9:35-10 AM; time spent face to face counseling greater than 50% of the appointment      Sincerely,     Karolina Massey  Diabetes Educator  Diabetes and Pregnancy Program

## 2019-03-15 NOTE — PROGRESS NOTES
34 yo [id number]  at [facility]  34w2d with obesity, h/o GDM and preeclampsia in previous pregnancies, fibroid uterus, renal stones s/p lithotripsy in 2018 presented to office for routine prenatal visit  Patient missed multiple appointments before this, last seen when she was 28 weeks 2 days, she was attempted to reach by multiple phone calls and letter, but patient states that we had her wrong  number and she did not get any of the calls, she states her car was broken down and so she could not make it to the appointments  She was supposed to be on insulin because of her elevated sugar levels, but she did not start that, as it was never sent to the pharmacy  She was again at the diabetic Center today and they started her on insulin, with advise to keep blood sugar logs  She has been going to the  center for twice weekly NST and SAMANTHA, on review of chart she did miss a couple of appointments there as well  She was there today in the morning, had reactive NST, SAMANTHA was 20 7  She was also found to have elevated blood pressure levels at least 2 times at  center, she did get her labs done 2 days ago  Today, patient reports normal fetal movement, denies any bleeding, leakage of fluid, discharge   She also denies any headaches, blurring of vision, pain right hypochondrium  She has high ultrasound scheduled for fetal growth on 2019    1  Gestational age 29 weeks 2 days  Labor precautions reviewed  Daily fetal kick counts  Patient to follow up in 1 week        2  A2GDM  Advised to start insulin as recommended at the diabetic Center, Basaglar 35 units at bedtime  Patient was supposed to start insulin after 2019 appointment but never did  Encouraged her to continue sending sugar logs and follow diet  Continue follow-up with diabetic center  Explained at great length complications of gestational diabetes mellitus  Continue fetal surveillance at  center as advised        3   Gestational hypertension/with history of preeclampsia in previous pregnancy  Patient had at least 2 elevated blood pressure levels at  center, blood pressure today is 130/90  Patient denies any headache, blurring of vision, pain right hypochondrium at this time  Platelet count on  was 262, protein creatinine ratio 0 26 (0 31 on 2019), AST/ALT /   Continue low-dose aspirin daily  Will repeat PC ratio, CMP, CBC prior to next visit  Return to care parameters discussed   Continue fetal surveillance at  center as advised    Given very noncompliant patient with gestational hypertension and GDM, I think she should be induced at 40 weeks, or sooner if indicated, will also discuss with  center    Patient also expressed desire for tubal ligation in case  as indicated, consent signed today    Patient to follow up in 1 week

## 2019-03-19 ENCOUNTER — TELEPHONE (OUTPATIENT)
Dept: FAMILY MEDICINE CLINIC | Facility: CLINIC | Age: 34
End: 2019-03-19

## 2019-03-19 ENCOUNTER — DOCUMENTATION (OUTPATIENT)
Dept: PERINATAL CARE | Facility: CLINIC | Age: 34
End: 2019-03-19

## 2019-03-19 ENCOUNTER — ROUTINE PRENATAL (OUTPATIENT)
Dept: PERINATAL CARE | Facility: CLINIC | Age: 34
End: 2019-03-19
Payer: COMMERCIAL

## 2019-03-19 ENCOUNTER — ULTRASOUND (OUTPATIENT)
Dept: PERINATAL CARE | Facility: CLINIC | Age: 34
End: 2019-03-19
Payer: COMMERCIAL

## 2019-03-19 VITALS
WEIGHT: 290 LBS | DIASTOLIC BLOOD PRESSURE: 77 MMHG | BODY MASS INDEX: 49.51 KG/M2 | SYSTOLIC BLOOD PRESSURE: 123 MMHG | HEIGHT: 64 IN | HEART RATE: 99 BPM

## 2019-03-19 VITALS
HEIGHT: 64 IN | BODY MASS INDEX: 49.58 KG/M2 | DIASTOLIC BLOOD PRESSURE: 77 MMHG | HEART RATE: 99 BPM | SYSTOLIC BLOOD PRESSURE: 123 MMHG | WEIGHT: 290.4 LBS

## 2019-03-19 DIAGNOSIS — O34.11 UTERINE FIBROIDS AFFECTING PREGNANCY IN FIRST TRIMESTER: ICD-10-CM

## 2019-03-19 DIAGNOSIS — O40.3XX0 POLYHYDRAMNIOS AFFECTING PREGNANCY IN THIRD TRIMESTER: Primary | ICD-10-CM

## 2019-03-19 DIAGNOSIS — Z3A.21 21 WEEKS GESTATION OF PREGNANCY: ICD-10-CM

## 2019-03-19 DIAGNOSIS — O24.414 INSULIN CONTROLLED GESTATIONAL DIABETES MELLITUS (GDM) IN THIRD TRIMESTER: ICD-10-CM

## 2019-03-19 DIAGNOSIS — D25.9 UTERINE FIBROIDS AFFECTING PREGNANCY IN FIRST TRIMESTER: ICD-10-CM

## 2019-03-19 DIAGNOSIS — O13.3 GESTATIONAL HYPERTENSION, THIRD TRIMESTER: Primary | ICD-10-CM

## 2019-03-19 DIAGNOSIS — Z3A.34 34 WEEKS GESTATION OF PREGNANCY: ICD-10-CM

## 2019-03-19 DIAGNOSIS — O40.3XX0 POLYHYDRAMNIOS AFFECTING PREGNANCY IN THIRD TRIMESTER: ICD-10-CM

## 2019-03-19 PROCEDURE — 76816 OB US FOLLOW-UP PER FETUS: CPT | Performed by: OBSTETRICS & GYNECOLOGY

## 2019-03-19 PROCEDURE — 76818 FETAL BIOPHYS PROFILE W/NST: CPT | Performed by: OBSTETRICS & GYNECOLOGY

## 2019-03-19 PROCEDURE — 99212 OFFICE O/P EST SF 10 MIN: CPT | Performed by: OBSTETRICS & GYNECOLOGY

## 2019-03-19 RX ORDER — INSULIN GLARGINE 100 [IU]/ML
INJECTION, SOLUTION SUBCUTANEOUS
Qty: 15 ML | Refills: 0 | Status: SHIPPED | OUTPATIENT
Start: 2019-03-19 | End: 2019-04-07 | Stop reason: HOSPADM

## 2019-03-19 NOTE — TELEPHONE ENCOUNTER
Pt was wondering if according to us and discussion with provider she can go with the  April 4 (37 week ppreferred)according to perinatology she said they did not think dangerous to wait but her bp still elevated and she is measuring a bit big according to the pt   Please call her she needs to arrange for her mother to fly here so she requests you contact her ASAP, Thank you

## 2019-03-19 NOTE — PROGRESS NOTES
Date:  19  RE: Aretha Moreno    : 1985  Estimated Date of Delivery: 19  EGA: 34w6d  OB/GYN: [facility]  Insulin controlled gestational diabetes      Date Fasting Post-  breakfast Post-  lunch Post-  dinner Before bedtime Carbs Comments                                                       Kaye in office for NST, glucose log requested and reports does not test because unable to eat the way that is recommended  Reports issues with fasting glucose and post breakfast glucose but no glucose log available  States history of GDM with previous 3 pregnancies, did report  hypoglycemia with second child  Reports is taking basaglar  35 units at bedtime  Current regimen:  Basaglar--35 units at bedtime  2100 calorie GDM diet with 3 meals and 3 snacks recommended but current diet unknown  States she is not following the diet  Diabetes and pregnancy dietitian Claude Loh made recommendations regarding diet on last log  Self-monitoring blood glucose recommended fasting and 2 hours post start of each meal, 4 times daily, patient is not testing glucose, verbally reported issues with fasting and post breakfast glucose  Plan:  Continue basaglar 35 units at bedtime  Recommend testing at least fasting glucose reading to make adjustments to basaglar  Reports will take glucose readings to her family doctor for review  Try to eat 3 meals and 3 snacks as recommended  NST twice a week and SAMANTHA weekly  3/13/19 [id number] 5%; increased from 5 8% on 18 growth ultrasound indicated appropriate growth for gestational age, but AC was 80%  SAMANTHA today shows normal fluid  Kaye is aware of all risk factors associated with hyperglycemia during pregnancy as outlined in Diabetes and Pregnancy booklet  Date due to report next:  Twice a week during NST  Note in chart for glucose log to be requested with each visit       SYLVESTER Caldwell, CDE  Diabetes Educator   Diabetes and Pregnancy Program

## 2019-03-20 NOTE — TELEPHONE ENCOUNTER
Pt called back returning Dr Tomer Serra call, please give a call back at number listed, she will be available   Thanks

## 2019-03-20 NOTE — TELEPHONE ENCOUNTER
This is a Prenatal who absolutely needs to be seen on Friday  Please have her schedule appointment with me on Friday and tell her we will discuss further that day    Thank You

## 2019-03-22 ENCOUNTER — OFFICE VISIT (OUTPATIENT)
Dept: FAMILY MEDICINE CLINIC | Facility: CLINIC | Age: 34
End: 2019-03-22

## 2019-03-22 VITALS — SYSTOLIC BLOOD PRESSURE: 130 MMHG | WEIGHT: 291.4 LBS | BODY MASS INDEX: 50.02 KG/M2 | DIASTOLIC BLOOD PRESSURE: 80 MMHG

## 2019-03-22 DIAGNOSIS — Z3A.34 34 WEEKS GESTATION OF PREGNANCY: ICD-10-CM

## 2019-03-22 DIAGNOSIS — Z3A.21 21 WEEKS GESTATION OF PREGNANCY: ICD-10-CM

## 2019-03-22 DIAGNOSIS — D25.9 UTERINE FIBROIDS AFFECTING PREGNANCY IN FIRST TRIMESTER: ICD-10-CM

## 2019-03-22 DIAGNOSIS — O34.11 UTERINE FIBROIDS AFFECTING PREGNANCY IN FIRST TRIMESTER: ICD-10-CM

## 2019-03-22 DIAGNOSIS — O24.414 INSULIN CONTROLLED GESTATIONAL DIABETES MELLITUS (GDM) IN THIRD TRIMESTER: ICD-10-CM

## 2019-03-22 DIAGNOSIS — Z3A.35 35 WEEKS GESTATION OF PREGNANCY: ICD-10-CM

## 2019-03-22 LAB
SL AMB  POCT GLUCOSE, UA: 250
SL AMB LEUKOCYTE ESTERASE,UA: NEGATIVE
SL AMB POCT BILIRUBIN,UA: NEGATIVE
SL AMB POCT BLOOD,UA: ABNORMAL
SL AMB POCT CLARITY,UA: ABNORMAL
SL AMB POCT COLOR,UA: YELLOW
SL AMB POCT KETONES,UA: 40
SL AMB POCT NITRITE,UA: NEGATIVE
SL AMB POCT PH,UA: 6.5
SL AMB POCT SPECIFIC GRAVITY,UA: 1.02
SL AMB POCT URINE PROTEIN: ABNORMAL
SL AMB POCT UROBILINOGEN: 0.2

## 2019-03-22 PROCEDURE — 99213 OFFICE O/P EST LOW 20 MIN: CPT | Performed by: FAMILY MEDICINE

## 2019-03-22 PROCEDURE — 87653 STREP B DNA AMP PROBE: CPT | Performed by: FAMILY MEDICINE

## 2019-03-22 PROCEDURE — 81003 URINALYSIS AUTO W/O SCOPE: CPT | Performed by: FAMILY MEDICINE

## 2019-03-22 PROCEDURE — 87184 SC STD DISK METHOD PER PLATE: CPT | Performed by: FAMILY MEDICINE

## 2019-03-22 NOTE — PROGRESS NOTES
34 yo [id number]  at [facility]  35w2d with obesity, A2GDM, gestational HTN, fibroid uterus, renal stones s/p lithotripsy in 2018 presented to office for routine prenatal visit  Patient states now she has started Basaglar 35 units at night, and now her fasting blood sugar is less than 90 usually between 80-90  She has decided not to follow up at diabetic Center today but states will continue going to  center for twice weekly NST and SAMANTHA  She had ultrasound done on  at [facility] 34w6d found to have AC 37w3d,AGA 35w4d, SAMANTHA 23 7  And has NST later today    Today, patient reports normal fetal movement, denies any bleeding, leakage of fluid, discharge   She also denies any headaches, blurring of vision, pain right hypochondrium    1  Gestational age 27 weeks 2 days  Labor precautions reviewed  Daily fetal kick counts  Patient to follow up in 1 week  GBS swab sent today    2  A2GDM  Advised to continue  insulin as recommended at the diabetic Center, Basaglar 35 units at bedtime  Patient was supposed to start insulin after 2019 appointment but never did  Encouraged her to continue sending sugar logs and follow diet  Continue follow-up with diabetic center  Explained at great length complications of gestational diabetes mellitus  Continue fetal surveillance at  center as advised    3  Gestational hypertension/with history of preeclampsia in previous pregnancy  Patient had at least 2 elevated blood pressure levels at  center, blood pressure today is 130/80  Patient denies any headache, blurring of vision, pain right hypochondrium at this time  Platelet count on  was 262, protein creatinine ratio 0 26 (0 31 on 2019), AST/ALT 18/21  Continue low-dose aspirin daily  Scripts given for repeat PC ratio, CMP, CBC prior to next visit  Return to care parameters discussed   Continue fetal surveillance at  center as advised    4   Anemia   Continue ferrous sulfate and Colace    Given gestational hypertension and A2GDM, consider induction at 37 weeks    Patient to follow up in 1 week

## 2019-03-25 ENCOUNTER — HOSPITAL ENCOUNTER (OUTPATIENT)
Facility: HOSPITAL | Age: 34
Discharge: HOME | End: 2019-03-25
Attending: OBSTETRICS & GYNECOLOGY | Admitting: OBSTETRICS & GYNECOLOGY
Payer: COMMERCIAL

## 2019-03-25 VITALS
WEIGHT: 289 LBS | SYSTOLIC BLOOD PRESSURE: 124 MMHG | BODY MASS INDEX: 49.34 KG/M2 | TEMPERATURE: 98.1 F | RESPIRATION RATE: 18 BRPM | HEIGHT: 64 IN | HEART RATE: 90 BPM | DIASTOLIC BLOOD PRESSURE: 65 MMHG | OXYGEN SATURATION: 98 %

## 2019-03-25 LAB
ALBUMIN SERPL BCP-MCNC: 2.4 G/DL (ref 3.5–5)
ALP SERPL-CCNC: 219 U/L (ref 46–116)
ALT SERPL W P-5'-P-CCNC: 25 U/L (ref 12–78)
ANION GAP SERPL CALCULATED.3IONS-SCNC: 8 MMOL/L (ref 4–13)
AST SERPL W P-5'-P-CCNC: 23 U/L (ref 5–45)
BACTERIA UR QL AUTO: ABNORMAL /HPF
BASOPHILS # BLD AUTO: 0.01 THOUSANDS/ΜL (ref 0–0.1)
BASOPHILS NFR BLD AUTO: 0 % (ref 0–1)
BILIRUB SERPL-MCNC: 0.27 MG/DL (ref 0.2–1)
BILIRUB UR QL STRIP: NEGATIVE
BUN SERPL-MCNC: 6 MG/DL (ref 5–25)
CALCIUM SERPL-MCNC: 8.6 MG/DL (ref 8.3–10.1)
CHLORIDE SERPL-SCNC: 107 MMOL/L (ref 100–108)
CLARITY UR: CLEAR
CO2 SERPL-SCNC: 20 MMOL/L (ref 21–32)
COLOR UR: YELLOW
CREAT SERPL-MCNC: 0.55 MG/DL (ref 0.6–1.3)
CREAT UR-MCNC: 18.8 MG/DL
EOSINOPHIL # BLD AUTO: 0.03 THOUSAND/ΜL (ref 0–0.61)
EOSINOPHIL NFR BLD AUTO: 0 % (ref 0–6)
ERYTHROCYTE [DISTWIDTH] IN BLOOD BY AUTOMATED COUNT: 15.4 % (ref 11.6–15.1)
GFR SERPL CREATININE-BSD FRML MDRD: 125 ML/MIN/1.73SQ M
GLUCOSE SERPL-MCNC: 171 MG/DL (ref 65–140)
GLUCOSE SERPL-MCNC: 228 MG/DL (ref 65–140)
GLUCOSE UR STRIP-MCNC: ABNORMAL MG/DL
HCT VFR BLD AUTO: 34.7 % (ref 34.8–46.1)
HGB BLD-MCNC: 10.8 G/DL (ref 11.5–15.4)
HGB UR QL STRIP.AUTO: NEGATIVE
HYALINE CASTS #/AREA URNS LPF: ABNORMAL /LPF
IMM GRANULOCYTES # BLD AUTO: 0.07 THOUSAND/UL (ref 0–0.2)
IMM GRANULOCYTES NFR BLD AUTO: 1 % (ref 0–2)
KETONES UR STRIP-MCNC: ABNORMAL MG/DL
LEUKOCYTE ESTERASE UR QL STRIP: ABNORMAL
LYMPHOCYTES # BLD AUTO: 1.13 THOUSANDS/ΜL (ref 0.6–4.47)
LYMPHOCYTES NFR BLD AUTO: 14 % (ref 14–44)
MCH RBC QN AUTO: 25.1 PG (ref 26.8–34.3)
MCHC RBC AUTO-ENTMCNC: 31.1 G/DL (ref 31.4–37.4)
MCV RBC AUTO: 81 FL (ref 82–98)
MONOCYTES # BLD AUTO: 0.35 THOUSAND/ΜL (ref 0.17–1.22)
MONOCYTES NFR BLD AUTO: 4 % (ref 4–12)
NEUTROPHILS # BLD AUTO: 6.48 THOUSANDS/ΜL (ref 1.85–7.62)
NEUTS SEG NFR BLD AUTO: 81 % (ref 43–75)
NITRITE UR QL STRIP: NEGATIVE
NON-SQ EPI CELLS URNS QL MICRO: ABNORMAL /HPF
NRBC BLD AUTO-RTO: 0 /100 WBCS
PH UR STRIP.AUTO: 6.5 [PH]
PLATELET # BLD AUTO: 263 THOUSANDS/UL (ref 149–390)
PMV BLD AUTO: 11.4 FL (ref 8.9–12.7)
POTASSIUM SERPL-SCNC: 3.4 MMOL/L (ref 3.5–5.3)
PROT SERPL-MCNC: 6.3 G/DL (ref 6.4–8.2)
PROT UR STRIP-MCNC: NEGATIVE MG/DL
PROT UR-MCNC: <6 MG/DL
PROT/CREAT UR: <0.32 MG/G{CREAT} (ref 0–0.1)
RBC # BLD AUTO: 4.31 MILLION/UL (ref 3.81–5.12)
RBC #/AREA URNS AUTO: ABNORMAL /HPF
SODIUM SERPL-SCNC: 135 MMOL/L (ref 136–145)
SP GR UR STRIP.AUTO: 1.02 (ref 1–1.03)
UROBILINOGEN UR QL STRIP.AUTO: 1 E.U./DL
WBC # BLD AUTO: 8.07 THOUSAND/UL (ref 4.31–10.16)
WBC #/AREA URNS AUTO: ABNORMAL /HPF

## 2019-03-25 PROCEDURE — 80053 COMPREHEN METABOLIC PANEL: CPT | Performed by: FAMILY MEDICINE

## 2019-03-25 PROCEDURE — 81001 URINALYSIS AUTO W/SCOPE: CPT | Performed by: FAMILY MEDICINE

## 2019-03-25 PROCEDURE — 84156 ASSAY OF PROTEIN URINE: CPT | Performed by: FAMILY MEDICINE

## 2019-03-25 PROCEDURE — 85025 COMPLETE CBC W/AUTO DIFF WBC: CPT | Performed by: FAMILY MEDICINE

## 2019-03-25 PROCEDURE — 82570 ASSAY OF URINE CREATININE: CPT | Performed by: FAMILY MEDICINE

## 2019-03-25 PROCEDURE — 82948 REAGENT STRIP/BLOOD GLUCOSE: CPT

## 2019-03-25 PROCEDURE — 99214 OFFICE O/P EST MOD 30 MIN: CPT

## 2019-03-25 RX ORDER — ACETAMINOPHEN 325 MG/1
975 TABLET ORAL ONCE
Status: COMPLETED | OUTPATIENT
Start: 2019-03-25 | End: 2019-03-25

## 2019-03-25 RX ADMIN — ACETAMINOPHEN 975 MG: 325 TABLET ORAL at 10:43

## 2019-03-25 NOTE — PROGRESS NOTES
OB Triage Note  Kaye Wilder 29 y o  female MRN: [id number]  Unit/Bed#:  Triage 2-01     SJ: Estimated Date of Delivery: 4/24/19    HPI: 29 y o  [id number] at 35w5d with history of obesity, A2GDM, gestational HTN, fibroid uterus, renal stones s/p lithotripsy in 07/2018 who presents to L&D triage today with c/o Lower abd pain and pressure of 2 days  Patient reports pain started a while ago due to the presence of her uterine fibroids but has been worsening in the past 2 days  She also reports Robley Los she rates at a 7/10 and LUQ pain  She denies RUQ or epigastric pain, vision changes, vomiting, VB, LOF or contractions  She feels fetal movements  Patient reports she has not been compliant with her insulin, she denies use last night and does not check monitor her blood sugars  She has scheduled biweekly NST, she missed her last appointment on Friday, most recent NST was on the 19th  and ultrasound done on 03/19 at [facility] 68 was found to have AC 37w3d,AGA 35w4d, SAMANTHA 23  7(polyhydramnios), with BPP of 8/8  BMI 49 8  Blood glucose on arrival is 228  She reports to have had a bagel and coffee for breakfast     Her BP at the 144/69 and 124/65 on arrival at L&D     Blood glucose per pt report are usually in the 80-90's fasting  HBA1C 6 5 on 3/19      OB Cx: gestational HTN, A2 GDM  and Polyhydramnios    FHT:  Baseline Rate: 140 bpm  Variability: Moderate 6-25 bpm  Accelerations: 15 x 15 or greater  Decelerations: None  FHR Category: Category I  TOCO:   Contraction Frequency (minutes): Irregular    Vitals:   Temp:  [97 9 °F (36 6 °C)-98 1 °F (36 7 °C)] 98 1 °F (36 7 °C)  HR:  [85-90] 90  Resp:  [18] 18  BP: (124-144)/(65-69) 124/65  SpO2:  [98 %] 98 %    Physical Exam:  General: AAOX3  No acute distress  Cardiovascular: Regular, Rate and Rhythm, no murmur, gallop or rub   Lungs: Clear to Auscultation Bilaterally, no wheezing, rhonchi or rales   Abdominal: Soft  NT/ND  Gravid    :no pooling, no VB, scant  discharge  Wet: negative  KOH: neg    Ferning: negative  Nitrazine: negative    SVE: /-3       Ultrasound: see fetal testing note      A/P: 29 y o female [id number] at 35w5d here for low abdominal Pain and rule out Preeclampsia  After evaluation  Fetal testing reassuring  Preeclamptic labs: Hgb 10 8g/dl, plts 263, AST/ALT  23/25, Cr 0 55   UA- negative for ptoteins      IUP @ 35w 5d  - Continue fetal kick counts   - Labor precations  - GBS, in process  - Continue scheduled follow up at [facility]      A2GDM  - Postprandial BG  POCT 228, 171 on CMP   - Counseled on compliance with Insulin, Basaglar 35 units qHS  - Encouraged to continue sugar logs and follow diabetic diet and diabetic center  - Continue biweekly NST at  center    Gestational HTN   - Hx of preE previous pregnancy  - /69 and 124/65  - C/o headache   -Denies RUQ pain, vision changes at this visit  - CBC, CMP, UA, Pro/Cr ratio  -Preeclamptic labs: Hgb 10 8g/dl, plts 263, AST/ALT  23/25, Cr 0 55   UA- negative for proteins  However Pro/ Cr ratio of  0 32  - Continue ASA  - Continue biweekly NST at  center      Anemia  - Hgb 10 8, plts 263    · Discharge home with precautions  Instructions reviewed with patient      Plan discussed with Dr Kevin Morris    Signature/Title: Luma Hopkins MD  Date: 3/25/2019  Time: 10:58 AM

## 2019-03-27 ENCOUNTER — OFFICE VISIT (OUTPATIENT)
Dept: PERINATAL CARE | Facility: CLINIC | Age: 34
End: 2019-03-27
Payer: COMMERCIAL

## 2019-03-27 VITALS
DIASTOLIC BLOOD PRESSURE: 76 MMHG | SYSTOLIC BLOOD PRESSURE: 149 MMHG | HEART RATE: 89 BPM | BODY MASS INDEX: 49.95 KG/M2 | HEIGHT: 64 IN | WEIGHT: 292.6 LBS

## 2019-03-27 DIAGNOSIS — Z3A.36 36 WEEKS GESTATION OF PREGNANCY: ICD-10-CM

## 2019-03-27 DIAGNOSIS — O13.3 GESTATIONAL HYPERTENSION, THIRD TRIMESTER: Primary | ICD-10-CM

## 2019-03-27 DIAGNOSIS — O24.414 INSULIN CONTROLLED GESTATIONAL DIABETES MELLITUS (GDM) IN THIRD TRIMESTER: ICD-10-CM

## 2019-03-27 PROBLEM — Z3A.21 21 WEEKS GESTATION OF PREGNANCY: Status: RESOLVED | Noted: 2018-10-29 | Resolved: 2019-03-27

## 2019-03-27 PROCEDURE — 76815 OB US LIMITED FETUS(S): CPT | Performed by: OBSTETRICS & GYNECOLOGY

## 2019-03-27 PROCEDURE — 59025 FETAL NON-STRESS TEST: CPT | Performed by: OBSTETRICS & GYNECOLOGY

## 2019-03-27 NOTE — PROGRESS NOTES
[id number]: Ms Roberto Smith was seen today at 36w0d for NST (found under the pregnancy episode) which I reviewed the RN assessment and agree, and SAMANTHA (see ultrasound report under OB procedures tab)  Please don't hesitate to contact our office with any concerns or questions

## 2019-03-29 ENCOUNTER — ROUTINE PRENATAL (OUTPATIENT)
Dept: FAMILY MEDICINE CLINIC | Facility: CLINIC | Age: 34
End: 2019-03-29

## 2019-03-29 VITALS — BODY MASS INDEX: 50.29 KG/M2 | WEIGHT: 293 LBS | DIASTOLIC BLOOD PRESSURE: 92 MMHG | SYSTOLIC BLOOD PRESSURE: 140 MMHG

## 2019-03-29 DIAGNOSIS — D25.9 UTERINE FIBROIDS AFFECTING PREGNANCY IN FIRST TRIMESTER: ICD-10-CM

## 2019-03-29 DIAGNOSIS — O13.3 GESTATIONAL HYPERTENSION, THIRD TRIMESTER: Primary | ICD-10-CM

## 2019-03-29 DIAGNOSIS — Z3A.36 36 WEEKS GESTATION OF PREGNANCY: ICD-10-CM

## 2019-03-29 DIAGNOSIS — O24.414 INSULIN CONTROLLED GESTATIONAL DIABETES MELLITUS (GDM) IN THIRD TRIMESTER: ICD-10-CM

## 2019-03-29 DIAGNOSIS — O34.11 UTERINE FIBROIDS AFFECTING PREGNANCY IN FIRST TRIMESTER: ICD-10-CM

## 2019-03-29 LAB
GP B STREP DNA SPEC QL NAA+PROBE: ABNORMAL
GP B STREP DNA SPEC QL NAA+PROBE: ABNORMAL
SL AMB  POCT GLUCOSE, UA: NEGATIVE
SL AMB LEUKOCYTE ESTERASE,UA: ABNORMAL
SL AMB POCT BILIRUBIN,UA: NEGATIVE
SL AMB POCT BLOOD,UA: ABNORMAL
SL AMB POCT CLARITY,UA: CLEAR
SL AMB POCT COLOR,UA: YELLOW
SL AMB POCT KETONES,UA: NEGATIVE
SL AMB POCT NITRITE,UA: NEGATIVE
SL AMB POCT PH,UA: 7
SL AMB POCT SPECIFIC GRAVITY,UA: 1
SL AMB POCT URINE PROTEIN: NEGATIVE
SL AMB POCT UROBILINOGEN: 0.2

## 2019-03-29 PROCEDURE — 99213 OFFICE O/P EST LOW 20 MIN: CPT | Performed by: FAMILY MEDICINE

## 2019-03-29 PROCEDURE — 81002 URINALYSIS NONAUTO W/O SCOPE: CPT | Performed by: FAMILY MEDICINE

## 2019-03-29 PROCEDURE — 84156 ASSAY OF PROTEIN URINE: CPT | Performed by: FAMILY MEDICINE

## 2019-03-29 PROCEDURE — 82570 ASSAY OF URINE CREATININE: CPT | Performed by: FAMILY MEDICINE

## 2019-03-29 NOTE — PROGRESS NOTES
34 yo [id number]  at [address] with obesity, A2GDM, gestational HTN, fibroid uterus, renal stones s/p lithotripsy in 2018 presented to office for routine prenatal visit  Patient states that she has been taking the Basaglar 35 units at night, was in the triage on 2019 with complaints of abdominal pain, headache  Blood pressure was found to be normal     NST on 3/27 was reactive with SAMANTHA 22 5  Dr Alexia Laura also recommended induction at 40 weeks given gestational hypertension  Today, patient reports normal fetal movement, denies any bleeding, leakage of fluid, discharge   She states she has been getting headaches at night, but denies any headache at this time  She denies any blurring of vision, pain right hypochondrium  Patient to go to  center today for NST, encouraged her to get that done  1  Gestational age 42 weeks 2 days  Labor precautions reviewed  Daily fetal kick counts  GBS positive  Cervical check today revealed 370/-3  Patient to be induced at 37 weeks, called  center, no spots available on 2019, patient scheduled for morning induction on 2019  Advised to take 18 units of insulin on the night prior to induction    2  A2GDM  Advised to continue  insulin as recommended at the diabetic Center, Basaglar 35 units at bedtime  Encouraged her to continue sending sugar logs and follow diet  Continue follow-up with diabetic center  Explained at great length complications of gestational diabetes mellitus  Continue fetal surveillance at  center as advised    3  Gestational hypertension/with history of preeclampsia in previous pregnancy  Blood pressure 140/92 in office today, she denies any headache, blurring of vision, pain right hypochondrium  Will repeat preeclampsia labs  Patient for induction at 37 weeks as above   Strict return to care parameters discussed   Continue fetal surveillance at  center as advised    4   Anemia   Continue ferrous sulfate and Samuel    Plan discussed with Dr Adams Zamudio

## 2019-03-30 ENCOUNTER — TELEPHONE (OUTPATIENT)
Dept: OTHER | Facility: HOSPITAL | Age: 34
End: 2019-03-30

## 2019-03-30 ENCOUNTER — TELEPHONE (OUTPATIENT)
Dept: OTHER | Facility: OTHER | Age: 34
End: 2019-03-30

## 2019-03-30 LAB
CREAT UR-MCNC: 77.5 MG/DL
PROT UR-MCNC: 23 MG/DL
PROT/CREAT UR: 0.3 MG/G{CREAT} (ref 0–0.1)

## 2019-03-30 NOTE — TELEPHONE ENCOUNTER
Kaye Joseph 1985  CONFIDENTIALTY NOTICE: This fax transmission is intended only for the addressee  It contains information that is legally privileged,  confidential or otherwise protected from use or disclosure  If you are not the intended recipient, you are strictly prohibited from reviewing,  disclosing, copying using or disseminating any of this information or taking any action in reliance on or regarding this information  If you have  received this fax in error, please notify us immediately by telephone so that we can arrange for its return to us  Page: 1  2  Call Id: [id number]  Health Call  Standard Call Report  Health Call  Patient Name: Dejuan Canales  Gender: Female  : 1985  Age: 29 Y 3 M 23 D  Return Phone  Number: [phone] (Home)  Address: [address]  Practice Name: [address] [place] [address]  Practice Charged:  Physician:  Kathleen Cross Name:  Relationship To  Patient: Self  Return Phone Number: [phone] (Home)  Presenting Problem: "I am pregnant and dilated over  3 cm since yesterday  I have pre  hypertension  My headaches are  getting worse  I was told to call in "  Service Type: Triage  Charged Service 1: N/A  Pharmacy Name and  Number:  Nurse Assessment  Nurse: Fortunato Lazo Date/Time: 3/30/2019 5:24:16 PM  Type of assessment required:  ---General (Adult or Child)  Duration of Current S/S  ---Today  Location/Radiation  ---Head  Temperature (F) and route:  ---Denies  Symptom Specific Meds (Dose/Time):  ---Tylenol/  Other S/S  ---Back pain Headache Pressure in the buttocks 3 cm dilated  Pain Scale on scale of 1-10, 10 being the worst:  Symptom progression:  ---worse  Intake and Output  ---WNL  LMP/ Pregnancy:  Kaye Bas 1985  CONFIDENTIALTY NOTICE: This fax transmission is intended only for the addressee   It contains information that is legally privileged,  confidential or otherwise protected from use or disclosure  If you are not the intended recipient, you are strictly prohibited from reviewing,  disclosing, copying using or disseminating any of this information or taking any action in reliance on or regarding this information  If you have  received this fax in error, please notify us immediately by telephone so that we can arrange for its return to us  Page: 2 of 2  Call Id: [id number]  Nurse Assessment  ---36 weeks  Breastfeeding  ---No  Last Exam/Treatment:  ---Was seen 3/29/2019  Protocols  Protocol Title Nurse Date/Time  High Blood Pressure Jayleen Benitez RN, Kaye Bautista 3/30/2019 5:23:27 PM  Question Caller Affirmed  Disp  Time Disposition Final User  3/30/2019 5:30:21 PM Go to L&D Now Yes Fede Villanueva RN, [facility] Advice Given Per Protocol  GO TO L&D NOW: You need to be seen  Go to the Labor and Delivery Unit or the Emergency Room at __________ [facility]  now  Drive carefully  CARE ADVICE given per High Blood Pressure (Adult) guideline  Caller Understands: Yes  Caller Disagree/Comply: Comply  PreDisposition: Unsure  Comments  User: Augie Meza RN Date/Time: 3/30/2019 5:31:29 PM  Spoke with Dr Alvaro Lambert and he was conference connected with patient to give further care advise

## 2019-03-31 ENCOUNTER — HOSPITAL ENCOUNTER (EMERGENCY)
Facility: HOSPITAL | Age: 34
Discharge: HOME | End: 2019-03-31
Admitting: FAMILY MEDICINE
Payer: COMMERCIAL

## 2019-03-31 VITALS
HEIGHT: 64 IN | SYSTOLIC BLOOD PRESSURE: 120 MMHG | OXYGEN SATURATION: 99 % | BODY MASS INDEX: 50.02 KG/M2 | DIASTOLIC BLOOD PRESSURE: 58 MMHG | HEART RATE: 96 BPM | TEMPERATURE: 98.4 F | WEIGHT: 293 LBS | RESPIRATION RATE: 20 BRPM

## 2019-03-31 PROCEDURE — 99214 OFFICE O/P EST MOD 30 MIN: CPT

## 2019-03-31 PROCEDURE — NS001 PR NO SIGNATURE OR ATTESTATION: Performed by: FAMILY MEDICINE

## 2019-04-04 ENCOUNTER — HOSPITAL ENCOUNTER (INPATIENT)
Facility: HOSPITAL | Age: 34
LOS: 3 days | Discharge: HOME | DRG: 560 | End: 2019-04-07
Attending: FAMILY MEDICINE | Admitting: OBSTETRICS & GYNECOLOGY
Payer: COMMERCIAL

## 2019-04-04 ENCOUNTER — HOSPITAL ENCOUNTER (EMERGENCY)
Dept: LABOR AND DELIVERY | Facility: HOSPITAL | Age: 34
Discharge: HOME | DRG: 560 | End: 2019-04-04
Payer: COMMERCIAL

## 2019-04-04 PROBLEM — Z3A.37 37 WEEKS GESTATION OF PREGNANCY: Status: ACTIVE | Noted: 2019-04-04

## 2019-04-04 LAB
ABO GROUP BLD: NORMAL
BASOPHILS # BLD AUTO: 0.02 THOUSANDS/ΜL (ref 0–0.1)
BASOPHILS NFR BLD AUTO: 0 % (ref 0–1)
BLD GP AB SCN SERPL QL: NEGATIVE
EOSINOPHIL # BLD AUTO: 0.05 THOUSAND/ΜL (ref 0–0.61)
EOSINOPHIL NFR BLD AUTO: 1 % (ref 0–6)
ERYTHROCYTE [DISTWIDTH] IN BLOOD BY AUTOMATED COUNT: 15.8 % (ref 11.6–15.1)
GLUCOSE SERPL-MCNC: 100 MG/DL (ref 65–140)
GLUCOSE SERPL-MCNC: 112 MG/DL (ref 65–140)
GLUCOSE SERPL-MCNC: 132 MG/DL (ref 65–140)
HCT VFR BLD AUTO: 36 % (ref 34.8–46.1)
HGB BLD-MCNC: 11 G/DL (ref 11.5–15.4)
IMM GRANULOCYTES # BLD AUTO: 0.07 THOUSAND/UL (ref 0–0.2)
IMM GRANULOCYTES NFR BLD AUTO: 1 % (ref 0–2)
LYMPHOCYTES # BLD AUTO: 1.64 THOUSANDS/ΜL (ref 0.6–4.47)
LYMPHOCYTES NFR BLD AUTO: 19 % (ref 14–44)
MCH RBC QN AUTO: 24.4 PG (ref 26.8–34.3)
MCHC RBC AUTO-ENTMCNC: 30.6 G/DL (ref 31.4–37.4)
MCV RBC AUTO: 80 FL (ref 82–98)
MONOCYTES # BLD AUTO: 0.61 THOUSAND/ΜL (ref 0.17–1.22)
MONOCYTES NFR BLD AUTO: 7 % (ref 4–12)
NEUTROPHILS # BLD AUTO: 6.42 THOUSANDS/ΜL (ref 1.85–7.62)
NEUTS SEG NFR BLD AUTO: 72 % (ref 43–75)
NRBC BLD AUTO-RTO: 0 /100 WBCS
PLATELET # BLD AUTO: 269 THOUSANDS/UL (ref 149–390)
PMV BLD AUTO: 11.5 FL (ref 8.9–12.7)
RBC # BLD AUTO: 4.51 MILLION/UL (ref 3.81–5.12)
RH BLD: POSITIVE
SPECIMEN EXPIRATION DATE: NORMAL
WBC # BLD AUTO: 8.81 THOUSAND/UL (ref 4.31–10.16)

## 2019-04-04 PROCEDURE — 86900 BLOOD TYPING SEROLOGIC ABO: CPT | Performed by: FAMILY MEDICINE

## 2019-04-04 PROCEDURE — 86592 SYPHILIS TEST NON-TREP QUAL: CPT | Performed by: FAMILY MEDICINE

## 2019-04-04 PROCEDURE — NC001 PR NO CHARGE: Performed by: OBSTETRICS & GYNECOLOGY

## 2019-04-04 PROCEDURE — 86901 BLOOD TYPING SEROLOGIC RH(D): CPT | Performed by: FAMILY MEDICINE

## 2019-04-04 PROCEDURE — 82948 REAGENT STRIP/BLOOD GLUCOSE: CPT

## 2019-04-04 PROCEDURE — 85025 COMPLETE CBC W/AUTO DIFF WBC: CPT | Performed by: FAMILY MEDICINE

## 2019-04-04 PROCEDURE — 4A1HXCZ MONITORING OF PRODUCTS OF CONCEPTION, CARDIAC RATE, EXTERNAL APPROACH: ICD-10-PCS | Performed by: OBSTETRICS & GYNECOLOGY

## 2019-04-04 PROCEDURE — 86850 RBC ANTIBODY SCREEN: CPT | Performed by: FAMILY MEDICINE

## 2019-04-04 RX ORDER — ONDANSETRON 2 MG/ML
4 INJECTION INTRAMUSCULAR; INTRAVENOUS EVERY 6 HOURS PRN
Status: DISCONTINUED | OUTPATIENT
Start: 2019-04-04 | End: 2019-04-05

## 2019-04-04 RX ORDER — CEFAZOLIN SODIUM 2 G/50ML
2000 SOLUTION INTRAVENOUS ONCE
Status: COMPLETED | OUTPATIENT
Start: 2019-04-04 | End: 2019-04-05

## 2019-04-04 RX ORDER — CEFAZOLIN SODIUM 1 G/50ML
1000 SOLUTION INTRAVENOUS EVERY 8 HOURS
Status: DISCONTINUED | OUTPATIENT
Start: 2019-04-05 | End: 2019-04-05

## 2019-04-04 RX ORDER — SODIUM CHLORIDE 9 MG/ML
125 INJECTION, SOLUTION INTRAVENOUS CONTINUOUS
Status: DISCONTINUED | OUTPATIENT
Start: 2019-04-04 | End: 2019-04-05

## 2019-04-04 RX ADMIN — CEFAZOLIN SODIUM 2000 MG: 2 SOLUTION INTRAVENOUS at 21:52

## 2019-04-04 RX ADMIN — SODIUM CHLORIDE 125 ML/HR: 0.9 INJECTION, SOLUTION INTRAVENOUS at 21:35

## 2019-04-05 ENCOUNTER — ANESTHESIA (INPATIENT)
Dept: LABOR AND DELIVERY | Facility: HOSPITAL | Age: 34
DRG: 560 | End: 2019-04-05
Payer: COMMERCIAL

## 2019-04-05 LAB
BASE EXCESS BLDCOA CALC-SCNC: -4 MMOL/L (ref 3–11)
BASE EXCESS BLDCOV CALC-SCNC: -5.1 MMOL/L (ref 1–9)
GLUCOSE SERPL-MCNC: 113 MG/DL (ref 65–140)
GLUCOSE SERPL-MCNC: 123 MG/DL (ref 65–140)
GLUCOSE SERPL-MCNC: 125 MG/DL (ref 65–140)
GLUCOSE SERPL-MCNC: 141 MG/DL (ref 65–140)
GLUCOSE SERPL-MCNC: 93 MG/DL (ref 65–140)
GLUCOSE SERPL-MCNC: 94 MG/DL (ref 65–140)
HCO3 BLDCOA-SCNC: 20.8 MMOL/L (ref 17.3–27.3)
HCO3 BLDCOV-SCNC: 19.4 MMOL/L (ref 12.2–28.6)
O2 CT VFR BLDCOA CALC: 18 ML/DL
OXYHGB MFR BLDCOA: 80.3 %
OXYHGB MFR BLDCOV: 80.8 %
PCO2 BLDCOA: 37.5 MM[HG] (ref 30–60)
PCO2 BLDCOV: 34.9 MM HG (ref 27–43)
PH BLDCOA: 7.36 [PH] (ref 7.23–7.43)
PH BLDCOV: 7.36 [PH] (ref 7.19–7.49)
PO2 BLDCOA: 33.8 MM HG (ref 5–25)
PO2 BLDCOV: 34.1 MM HG (ref 15–45)
RPR SER QL: NORMAL
SAO2 % BLDCOV: 18.1 ML/DL

## 2019-04-05 PROCEDURE — 59409 OBSTETRICAL CARE: CPT | Performed by: OBSTETRICS & GYNECOLOGY

## 2019-04-05 PROCEDURE — 88307 TISSUE EXAM BY PATHOLOGIST: CPT | Performed by: PATHOLOGY

## 2019-04-05 PROCEDURE — 82805 BLOOD GASES W/O2 SATURATION: CPT | Performed by: FAMILY MEDICINE

## 2019-04-05 PROCEDURE — 82948 REAGENT STRIP/BLOOD GLUCOSE: CPT

## 2019-04-05 RX ORDER — ACETAMINOPHEN 325 MG/1
975 TABLET ORAL EVERY 8 HOURS
Status: DISCONTINUED | OUTPATIENT
Start: 2019-04-05 | End: 2019-04-07 | Stop reason: HOSPADM

## 2019-04-05 RX ORDER — LIDOCAINE HYDROCHLORIDE AND EPINEPHRINE 15; 5 MG/ML; UG/ML
INJECTION, SOLUTION EPIDURAL AS NEEDED
Status: DISCONTINUED | OUTPATIENT
Start: 2019-04-05 | End: 2019-04-05 | Stop reason: SURG

## 2019-04-05 RX ORDER — DIAPER,BRIEF,INFANT-TODD,DISP
1 EACH MISCELLANEOUS AS NEEDED
Status: DISCONTINUED | OUTPATIENT
Start: 2019-04-05 | End: 2019-04-07 | Stop reason: HOSPADM

## 2019-04-05 RX ORDER — OXYTOCIN/RINGER'S LACTATE 30/500 ML
250 PLASTIC BAG, INJECTION (ML) INTRAVENOUS CONTINUOUS
Status: DISCONTINUED | OUTPATIENT
Start: 2019-04-05 | End: 2019-04-05

## 2019-04-05 RX ORDER — DEXTROSE AND SODIUM CHLORIDE 5; .9 G/100ML; G/100ML
100 INJECTION, SOLUTION INTRAVENOUS CONTINUOUS
Status: DISCONTINUED | OUTPATIENT
Start: 2019-04-05 | End: 2019-04-05

## 2019-04-05 RX ORDER — OXYTOCIN/RINGER'S LACTATE 30/500 ML
PLASTIC BAG, INJECTION (ML) INTRAVENOUS
Status: COMPLETED
Start: 2019-04-05 | End: 2019-04-05

## 2019-04-05 RX ORDER — CALCIUM CARBONATE 200(500)MG
1000 TABLET,CHEWABLE ORAL DAILY PRN
Status: DISCONTINUED | OUTPATIENT
Start: 2019-04-05 | End: 2019-04-07 | Stop reason: HOSPADM

## 2019-04-05 RX ORDER — IBUPROFEN 600 MG/1
600 TABLET ORAL EVERY 4 HOURS PRN
Status: DISCONTINUED | OUTPATIENT
Start: 2019-04-05 | End: 2019-04-07 | Stop reason: HOSPADM

## 2019-04-05 RX ORDER — SIMETHICONE 80 MG
80 TABLET,CHEWABLE ORAL 4 TIMES DAILY PRN
Status: DISCONTINUED | OUTPATIENT
Start: 2019-04-05 | End: 2019-04-07 | Stop reason: HOSPADM

## 2019-04-05 RX ORDER — OXYCODONE HYDROCHLORIDE AND ACETAMINOPHEN 5; 325 MG/1; MG/1
1 TABLET ORAL ONCE
Status: COMPLETED | OUTPATIENT
Start: 2019-04-05 | End: 2019-04-05

## 2019-04-05 RX ORDER — DIPHENHYDRAMINE HCL 25 MG
25 TABLET ORAL EVERY 6 HOURS PRN
Status: DISCONTINUED | OUTPATIENT
Start: 2019-04-05 | End: 2019-04-07 | Stop reason: HOSPADM

## 2019-04-05 RX ORDER — OXYTOCIN/RINGER'S LACTATE 30/500 ML
1-30 PLASTIC BAG, INJECTION (ML) INTRAVENOUS
Status: DISCONTINUED | OUTPATIENT
Start: 2019-04-05 | End: 2019-04-05

## 2019-04-05 RX ORDER — MAGNESIUM HYDROXIDE/ALUMINUM HYDROXICE/SIMETHICONE 120; 1200; 1200 MG/30ML; MG/30ML; MG/30ML
15 SUSPENSION ORAL EVERY 6 HOURS PRN
Status: DISCONTINUED | OUTPATIENT
Start: 2019-04-05 | End: 2019-04-07 | Stop reason: HOSPADM

## 2019-04-05 RX ORDER — ONDANSETRON 2 MG/ML
4 INJECTION INTRAMUSCULAR; INTRAVENOUS EVERY 8 HOURS PRN
Status: DISCONTINUED | OUTPATIENT
Start: 2019-04-05 | End: 2019-04-07 | Stop reason: HOSPADM

## 2019-04-05 RX ORDER — ROPIVACAINE HYDROCHLORIDE 2 MG/ML
INJECTION, SOLUTION EPIDURAL; INFILTRATION; PERINEURAL AS NEEDED
Status: DISCONTINUED | OUTPATIENT
Start: 2019-04-05 | End: 2019-04-05 | Stop reason: SURG

## 2019-04-05 RX ORDER — DOCUSATE SODIUM 100 MG/1
100 CAPSULE, LIQUID FILLED ORAL 2 TIMES DAILY
Status: DISCONTINUED | OUTPATIENT
Start: 2019-04-05 | End: 2019-04-07 | Stop reason: HOSPADM

## 2019-04-05 RX ORDER — ACETAMINOPHEN 325 MG/1
650 TABLET ORAL EVERY 4 HOURS PRN
Status: DISCONTINUED | OUTPATIENT
Start: 2019-04-05 | End: 2019-04-05

## 2019-04-05 RX ADMIN — SODIUM CHLORIDE 0.5 UNITS/HR: 9 INJECTION, SOLUTION INTRAVENOUS at 03:59

## 2019-04-05 RX ADMIN — Medication 2 MILLI-UNITS/MIN: at 05:09

## 2019-04-05 RX ADMIN — DOCUSATE SODIUM 100 MG: 100 CAPSULE, LIQUID FILLED ORAL at 17:30

## 2019-04-05 RX ADMIN — SODIUM CHLORIDE 125 ML/HR: 0.9 INJECTION, SOLUTION INTRAVENOUS at 02:40

## 2019-04-05 RX ADMIN — DOCUSATE SODIUM 100 MG: 100 CAPSULE, LIQUID FILLED ORAL at 10:41

## 2019-04-05 RX ADMIN — IBUPROFEN 600 MG: 600 TABLET ORAL at 10:41

## 2019-04-05 RX ADMIN — HYDROCORTISONE 1 APPLICATION: 1 CREAM TOPICAL at 10:41

## 2019-04-05 RX ADMIN — LIDOCAINE HYDROCHLORIDE AND EPINEPHRINE 3 ML: 15; 5 INJECTION, SOLUTION EPIDURAL at 03:38

## 2019-04-05 RX ADMIN — WITCH HAZEL 1 PAD: 500 SOLUTION RECTAL; TOPICAL at 10:41

## 2019-04-05 RX ADMIN — ACETAMINOPHEN 975 MG: 325 TABLET ORAL at 17:30

## 2019-04-05 RX ADMIN — OXYCODONE HYDROCHLORIDE AND ACETAMINOPHEN 1 TABLET: 5; 325 TABLET ORAL at 21:42

## 2019-04-05 RX ADMIN — CEFAZOLIN SODIUM 1000 MG: 1 SOLUTION INTRAVENOUS at 05:20

## 2019-04-05 RX ADMIN — ROPIVACAINE HYDROCHLORIDE 10 ML: 2 INJECTION, SOLUTION EPIDURAL; INFILTRATION at 03:43

## 2019-04-05 RX ADMIN — BENZOCAINE AND LEVOMENTHOL: 200; 5 SPRAY TOPICAL at 10:41

## 2019-04-05 RX ADMIN — ROPIVACAINE HYDROCHLORIDE: 2 INJECTION, SOLUTION EPIDURAL; INFILTRATION at 03:46

## 2019-04-05 RX ADMIN — DEXTROSE AND SODIUM CHLORIDE 100 ML/HR: 5; .9 INJECTION, SOLUTION INTRAVENOUS at 03:59

## 2019-04-06 PROCEDURE — 99024 POSTOP FOLLOW-UP VISIT: CPT | Performed by: OBSTETRICS & GYNECOLOGY

## 2019-04-06 RX ORDER — OXYCODONE HYDROCHLORIDE AND ACETAMINOPHEN 5; 325 MG/1; MG/1
1 TABLET ORAL ONCE
Status: COMPLETED | OUTPATIENT
Start: 2019-04-06 | End: 2019-04-06

## 2019-04-06 RX ADMIN — DOCUSATE SODIUM 100 MG: 100 CAPSULE, LIQUID FILLED ORAL at 18:39

## 2019-04-06 RX ADMIN — DOCUSATE SODIUM 100 MG: 100 CAPSULE, LIQUID FILLED ORAL at 09:53

## 2019-04-06 RX ADMIN — ACETAMINOPHEN 975 MG: 325 TABLET ORAL at 18:42

## 2019-04-06 RX ADMIN — WITCH HAZEL 1 PAD: 500 SOLUTION RECTAL; TOPICAL at 01:12

## 2019-04-06 RX ADMIN — IBUPROFEN 600 MG: 600 TABLET ORAL at 18:39

## 2019-04-06 RX ADMIN — IBUPROFEN 600 MG: 600 TABLET ORAL at 06:45

## 2019-04-06 RX ADMIN — ACETAMINOPHEN 975 MG: 325 TABLET ORAL at 09:53

## 2019-04-06 RX ADMIN — OXYCODONE HYDROCHLORIDE AND ACETAMINOPHEN 1 TABLET: 5; 325 TABLET ORAL at 22:55

## 2019-04-06 RX ADMIN — BENZOCAINE AND LEVOMENTHOL: 200; 5 SPRAY TOPICAL at 01:12

## 2019-04-07 VITALS
OXYGEN SATURATION: 98 % | HEIGHT: 64 IN | HEART RATE: 84 BPM | BODY MASS INDEX: 49.34 KG/M2 | RESPIRATION RATE: 16 BRPM | DIASTOLIC BLOOD PRESSURE: 78 MMHG | WEIGHT: 289 LBS | SYSTOLIC BLOOD PRESSURE: 141 MMHG | TEMPERATURE: 98.2 F

## 2019-04-07 PROCEDURE — NC001 PR NO CHARGE: Performed by: OBSTETRICS & GYNECOLOGY

## 2019-04-07 PROCEDURE — 99024 POSTOP FOLLOW-UP VISIT: CPT | Performed by: OBSTETRICS & GYNECOLOGY

## 2019-04-07 RX ORDER — IBUPROFEN 600 MG/1
600 TABLET ORAL EVERY 4 HOURS PRN
Qty: 30 TABLET | Refills: 0 | Status: SHIPPED | OUTPATIENT
Start: 2019-04-07 | End: 2019-05-24 | Stop reason: ALTCHOICE

## 2019-04-07 RX ORDER — ACETAMINOPHEN 325 MG/1
975 TABLET ORAL EVERY 8 HOURS
Qty: 30 TABLET | Refills: 0 | Status: SHIPPED | OUTPATIENT
Start: 2019-04-07 | End: 2019-05-24 | Stop reason: ALTCHOICE

## 2019-04-07 RX ORDER — DIAPER,BRIEF,INFANT-TODD,DISP
1 EACH MISCELLANEOUS AS NEEDED
Qty: 30 G | Refills: 0 | Status: SHIPPED | OUTPATIENT
Start: 2019-04-07 | End: 2019-05-24 | Stop reason: ALTCHOICE

## 2019-04-07 RX ORDER — MAGNESIUM HYDROXIDE/ALUMINUM HYDROXICE/SIMETHICONE 120; 1200; 1200 MG/30ML; MG/30ML; MG/30ML
15 SUSPENSION ORAL EVERY 6 HOURS PRN
Qty: 355 ML | Refills: 0 | Status: SHIPPED | OUTPATIENT
Start: 2019-04-07 | End: 2019-05-24 | Stop reason: ALTCHOICE

## 2019-04-07 RX ORDER — CALCIUM CARBONATE 200(500)MG
1000 TABLET,CHEWABLE ORAL DAILY PRN
Qty: 20 TABLET | Refills: 0 | Status: SHIPPED | OUTPATIENT
Start: 2019-04-07 | End: 2019-05-24 | Stop reason: ALTCHOICE

## 2019-04-07 RX ORDER — SIMETHICONE 80 MG
80 TABLET,CHEWABLE ORAL 4 TIMES DAILY PRN
Qty: 30 TABLET | Refills: 0 | Status: SHIPPED | OUTPATIENT
Start: 2019-04-07 | End: 2019-05-24 | Stop reason: ALTCHOICE

## 2019-04-07 RX ORDER — DIPHENHYDRAMINE HCL 25 MG
25 TABLET ORAL EVERY 6 HOURS PRN
Qty: 30 TABLET | Refills: 0 | Status: SHIPPED | OUTPATIENT
Start: 2019-04-07 | End: 2019-05-24

## 2019-04-07 RX ADMIN — DOCUSATE SODIUM 100 MG: 100 CAPSULE, LIQUID FILLED ORAL at 09:11

## 2019-04-07 RX ADMIN — IBUPROFEN 600 MG: 600 TABLET ORAL at 04:23

## 2019-04-07 RX ADMIN — ACETAMINOPHEN 975 MG: 325 TABLET ORAL at 09:11

## 2019-04-09 ENCOUNTER — TRANSITIONAL CARE MANAGEMENT (OUTPATIENT)
Dept: FAMILY MEDICINE CLINIC | Facility: CLINIC | Age: 34
End: 2019-04-09

## 2019-05-24 ENCOUNTER — OFFICE VISIT (OUTPATIENT)
Dept: FAMILY MEDICINE CLINIC | Facility: CLINIC | Age: 34
End: 2019-05-24

## 2019-05-24 VITALS
RESPIRATION RATE: 16 BRPM | WEIGHT: 282 LBS | TEMPERATURE: 98.7 F | HEIGHT: 64 IN | HEART RATE: 80 BPM | DIASTOLIC BLOOD PRESSURE: 90 MMHG | BODY MASS INDEX: 48.14 KG/M2 | SYSTOLIC BLOOD PRESSURE: 130 MMHG

## 2019-05-24 DIAGNOSIS — O34.11 UTERINE FIBROIDS AFFECTING PREGNANCY IN FIRST TRIMESTER: ICD-10-CM

## 2019-05-24 DIAGNOSIS — D25.9 UTERINE FIBROIDS AFFECTING PREGNANCY IN FIRST TRIMESTER: ICD-10-CM

## 2019-05-24 DIAGNOSIS — F41.9 ANXIETY: Primary | ICD-10-CM

## 2019-05-24 DIAGNOSIS — Z86.32 HISTORY OF INSULIN CONTROLLED GESTATIONAL DIABETES MELLITUS (GDM): ICD-10-CM

## 2019-05-24 DIAGNOSIS — F32.A ANXIETY AND DEPRESSION: ICD-10-CM

## 2019-05-24 DIAGNOSIS — F41.9 ANXIETY AND DEPRESSION: ICD-10-CM

## 2019-05-24 DIAGNOSIS — D50.8 IRON DEFICIENCY ANEMIA SECONDARY TO INADEQUATE DIETARY IRON INTAKE: ICD-10-CM

## 2019-05-24 DIAGNOSIS — Z01.818 TUBAL LIGATION EVALUATION: ICD-10-CM

## 2019-05-24 PROBLEM — Z3A.37 37 WEEKS GESTATION OF PREGNANCY: Status: RESOLVED | Noted: 2019-04-04 | Resolved: 2019-05-24

## 2019-05-24 PROBLEM — R10.30 LOWER ABDOMINAL PAIN: Status: RESOLVED | Noted: 2018-12-28 | Resolved: 2019-05-24

## 2019-05-24 PROBLEM — O13.3 GESTATIONAL HYPERTENSION, THIRD TRIMESTER: Status: RESOLVED | Noted: 2019-03-15 | Resolved: 2019-05-24

## 2019-05-24 PROBLEM — O09.91 HIGH-RISK PREGNANCY IN FIRST TRIMESTER: Status: RESOLVED | Noted: 2018-10-16 | Resolved: 2019-05-24

## 2019-05-24 PROBLEM — O35.2XX0 HEREDITARY DISEASE IN FAMILY POSSIBLY AFFECTING FETUS: Status: RESOLVED | Noted: 2018-10-16 | Resolved: 2019-05-24

## 2019-05-24 PROBLEM — O99.212 OBESITY AFFECTING PREGNANCY IN SECOND TRIMESTER: Status: RESOLVED | Noted: 2018-12-11 | Resolved: 2019-05-24

## 2019-05-24 PROBLEM — Z3A.36 36 WEEKS GESTATION OF PREGNANCY: Status: RESOLVED | Noted: 2018-12-27 | Resolved: 2019-05-24

## 2019-05-24 PROCEDURE — 99213 OFFICE O/P EST LOW 20 MIN: CPT | Performed by: FAMILY MEDICINE

## 2019-05-24 RX ORDER — HYDROXYZINE PAMOATE 25 MG/1
25 CAPSULE ORAL 3 TIMES DAILY PRN
Qty: 30 CAPSULE | Refills: 0 | Status: SHIPPED | OUTPATIENT
Start: 2019-05-24 | End: 2019-05-24 | Stop reason: ALTCHOICE

## 2019-05-24 RX ORDER — ESCITALOPRAM OXALATE 5 MG/1
5 TABLET ORAL DAILY
Qty: 30 TABLET | Refills: 2 | Status: SHIPPED | OUTPATIENT
Start: 2019-05-24 | End: 2019-08-14 | Stop reason: ALTCHOICE

## 2019-08-14 ENCOUNTER — OFFICE VISIT (OUTPATIENT)
Dept: FAMILY MEDICINE CLINIC | Facility: CLINIC | Age: 34
End: 2019-08-14

## 2019-08-14 VITALS
HEIGHT: 64 IN | WEIGHT: 278 LBS | DIASTOLIC BLOOD PRESSURE: 80 MMHG | BODY MASS INDEX: 47.46 KG/M2 | SYSTOLIC BLOOD PRESSURE: 116 MMHG | TEMPERATURE: 99.6 F | RESPIRATION RATE: 18 BRPM | HEART RATE: 82 BPM

## 2019-08-14 DIAGNOSIS — F41.8 DEPRESSION WITH ANXIETY: Primary | ICD-10-CM

## 2019-08-14 PROCEDURE — 3725F SCREEN DEPRESSION PERFORMED: CPT | Performed by: FAMILY MEDICINE

## 2019-08-14 PROCEDURE — 1036F TOBACCO NON-USER: CPT | Performed by: FAMILY MEDICINE

## 2019-08-14 PROCEDURE — 99213 OFFICE O/P EST LOW 20 MIN: CPT | Performed by: FAMILY MEDICINE

## 2019-08-14 PROCEDURE — 3008F BODY MASS INDEX DOCD: CPT | Performed by: FAMILY MEDICINE

## 2019-08-14 RX ORDER — ALPRAZOLAM 0.5 MG/1
0.5 TABLET ORAL 2 TIMES DAILY PRN
Qty: 20 TABLET | Refills: 0 | Status: SHIPPED | OUTPATIENT
Start: 2019-08-14 | End: 2019-12-05 | Stop reason: SDUPTHER

## 2019-08-14 RX ORDER — ESCITALOPRAM OXALATE 10 MG/1
10 TABLET ORAL DAILY
Qty: 30 TABLET | Refills: 0 | Status: SHIPPED | OUTPATIENT
Start: 2019-08-14 | End: 2019-12-05 | Stop reason: SDUPTHER

## 2019-08-14 NOTE — PATIENT INSTRUCTIONS
Restart Lexapro tonight  Only use Xanax as needed for panic attacks and until the Lexapro is effective  Go to counseling

## 2019-08-14 NOTE — PROGRESS NOTES
Assessment/Plan:    Depression with anxiety  Restarted Lexapro 10 mg at night   RX Xanax to use short-term for panic attacks until Lexapro reaches max efficacy  Made an appointment with [facility]" for [facility]  Has support at home  She is not breastfeeding  CONCHA and PHQ-9 completed  No thoughts of hurting self or others  CONCHA-12  PHQ-11         Problem List Items Addressed This Visit        Other    Depression with anxiety - Primary     Restarted Lexapro 10 mg at night   RX Xanax to use short-term for panic attacks until Lexapro reaches max efficacy  Made an appointment with "[facility]" for counseling  Has support at home  She is not breastfeeding  CONCHA and PHQ-9 completed  No thoughts of hurting self or others  CONCHA-12  PHQ-11         Relevant Medications    escitalopram (LEXAPRO) 10 mg tablet    ALPRAZolam (XANAX) 0 5 mg tablet            Subjective:      Patient ID: João Varela is a 29 y o  female  29year old female presents for management of CONCHA  She was taking Lexapro and Xanax ans stopped when she was pregnant  Her baby is 1 months old  She is having increase in anxiety and panic attacks  She tried atarax and it was not effective  She wants to go back on Lexapro and use ativan as needed for panic attacks  She is in a work program and has support at home and has an appointment with a counselor  The following portions of the patient's history were reviewed and updated as appropriate: allergies, current medications, past family history, past medical history, past social history, past surgical history and problem list     Review of Systems   Constitutional: Negative  Cardiovascular: Negative  Psychiatric/Behavioral: Negative for suicidal ideas  The patient is nervous/anxious            Objective:      /80   Pulse 82   Temp 99 6 °F (37 6 °C)   Resp 18   Ht 5' 4" (1 626 m)   Wt 126 kg (278 lb)   BMI 47 72 kg/m²          Physical Exam   Constitutional: She is oriented to person, place, and time  She appears well-developed and well-nourished  Cardiovascular: Normal rate, regular rhythm and normal heart sounds  Pulmonary/Chest: Effort normal and breath sounds normal    Neurological: She is alert and oriented to person, place, and time  Psychiatric: Her speech is normal and behavior is normal  Judgment and thought content normal  Her mood appears anxious   Cognition and memory are normal

## 2019-08-14 NOTE — ASSESSMENT & PLAN NOTE
Restarted Lexapro 10 mg at night   RX Xanax to use short-term for panic attacks until Lexapro reaches max efficacy  Made an appointment with "[facility]" for counseling  Has support at home  She is not breastfeeding  CONCHA and PHQ-9 completed  No thoughts of hurting self or others    CONCHA-12  PHQ-11

## 2019-12-05 ENCOUNTER — OFFICE VISIT (OUTPATIENT)
Dept: FAMILY MEDICINE CLINIC | Facility: CLINIC | Age: 34
End: 2019-12-05

## 2019-12-05 VITALS
BODY MASS INDEX: 46.13 KG/M2 | RESPIRATION RATE: 18 BRPM | DIASTOLIC BLOOD PRESSURE: 86 MMHG | SYSTOLIC BLOOD PRESSURE: 112 MMHG | HEART RATE: 60 BPM | WEIGHT: 270.2 LBS | HEIGHT: 64 IN | TEMPERATURE: 99 F

## 2019-12-05 DIAGNOSIS — F41.8 DEPRESSION WITH ANXIETY: ICD-10-CM

## 2019-12-05 DIAGNOSIS — F41.9 ANXIETY AND DEPRESSION: Primary | ICD-10-CM

## 2019-12-05 DIAGNOSIS — F32.A ANXIETY AND DEPRESSION: Primary | ICD-10-CM

## 2019-12-05 PROCEDURE — 99213 OFFICE O/P EST LOW 20 MIN: CPT | Performed by: FAMILY MEDICINE

## 2019-12-05 PROCEDURE — 3725F SCREEN DEPRESSION PERFORMED: CPT | Performed by: FAMILY MEDICINE

## 2019-12-05 RX ORDER — ALPRAZOLAM 0.5 MG/1
0.5 TABLET ORAL AS NEEDED
Qty: 5 TABLET | Refills: 0 | Status: SHIPPED | OUTPATIENT
Start: 2019-12-05 | End: 2019-12-05 | Stop reason: SDUPTHER

## 2019-12-05 RX ORDER — HYDROXYZINE HYDROCHLORIDE 25 MG/1
25 TABLET, FILM COATED ORAL EVERY 6 HOURS PRN
Qty: 30 TABLET | Refills: 0 | Status: SHIPPED | OUTPATIENT
Start: 2019-12-05 | End: 2020-04-14 | Stop reason: SDUPTHER

## 2019-12-05 RX ORDER — ALPRAZOLAM 0.5 MG/1
0.5 TABLET ORAL AS NEEDED
Qty: 5 TABLET | Refills: 0 | Status: SHIPPED | OUTPATIENT
Start: 2019-12-05 | End: 2021-10-12

## 2019-12-05 RX ORDER — ESCITALOPRAM OXALATE 10 MG/1
10 TABLET ORAL DAILY
Qty: 30 TABLET | Refills: 0 | Status: SHIPPED | OUTPATIENT
Start: 2019-12-05 | End: 2020-01-01 | Stop reason: SDUPTHER

## 2019-12-05 NOTE — ASSESSMENT & PLAN NOTE
Advised patient to continue Lexapro 10 mg  Recommended Atarax 25 mg as needed for panic attacks  Patient also to see [facility] next month   Also refilled 5 tablets of Xanax, advised patient to take hydroxyzine 1st for panic attacks, if not helping then take Xanax

## 2019-12-05 NOTE — PROGRESS NOTES
Assessment/Plan:      Diagnoses and all orders for this visit:    Anxiety and depression  Advised patient to continue Lexapro 10 mg  Recommended Atarax 25 mg as needed for panic attacks  Patient also to see [facility] next month  Also refilled 5 tablets of Xanax, advised patient to take hydroxyzine 1st for panic attacks, if not helping then take Xanax    Depression with anxiety  -     hydrOXYzine HCL (ATARAX) 25 mg tablet; Take 1 tablet (25 mg total) by mouth every 6 (six) hours as needed for itching  -     escitalopram (LEXAPRO) 10 mg tablet; Take 1 tablet (10 mg total) by mouth daily  -     Discontinue: ALPRAZolam (XANAX) 0 5 mg tablet; Take 1 tablet (0 5 mg total) by mouth as needed for anxiety  -     Discontinue: ALPRAZolam (XANAX) 0 5 mg tablet; Take 1 tablet (0 5 mg total) by mouth as needed for anxiety  -     ALPRAZolam (XANAX) 0 5 mg tablet; Take 1 tablet (0 5 mg total) by mouth as needed for anxiety        Subjective:     Patient ID: Tala Cifuentes is a 36 y o  female who presents for follow up for CONCHA  HPI   Last appointment patient was started on Lexapro 10 mg and Xanax 0 5 mg as needed for panic attacks  After taking Lexapro for 1 5 months, she stopped because she felt that returning to work would improve her mood and that she would not need the medication  However, one week ago, patient started taking Lexapro again because her mood was starting to decline  She also feels that Xanax 0 5 mg is not as effective as cutting a 1 mg pill in half  Denies any medication side effects  Denies any SI or HI  She does often still have panic attacks but tries to manage them through walking or giving herself time to cry  Review of Systems   Constitutional: Negative for chills and fever  HENT: Negative for congestion  Respiratory: Negative for cough and shortness of breath  Cardiovascular: Negative for chest pain  Gastrointestinal: Negative for diarrhea, nausea and vomiting  Genitourinary: Negative for difficulty urinating  Neurological: Negative for headaches  Psychiatric/Behavioral: Negative for self-injury  The patient is nervous/anxious  Objective:     Physical Exam   Constitutional: She is oriented to person, place, and time  She appears well-developed and well-nourished  HENT:   Head: Normocephalic and atraumatic  Eyes: Conjunctivae and EOM are normal    Neck: Normal range of motion  Cardiovascular: Normal rate, regular rhythm and normal heart sounds  Exam reveals no friction rub  No murmur heard  Pulmonary/Chest: Effort normal and breath sounds normal  No respiratory distress  She has no wheezes  She has no rales  Abdominal: Soft  She exhibits no distension  Musculoskeletal: Normal range of motion  Neurological: She is alert and oriented to person, place, and time  Skin: Skin is warm and dry  Psychiatric: She has a normal mood and affect

## 2019-12-09 ENCOUNTER — OFFICE VISIT (OUTPATIENT)
Dept: FAMILY MEDICINE CLINIC | Facility: CLINIC | Age: 34
End: 2019-12-09

## 2019-12-09 VITALS
WEIGHT: 267.4 LBS | HEART RATE: 78 BPM | TEMPERATURE: 98.2 F | HEIGHT: 64 IN | DIASTOLIC BLOOD PRESSURE: 80 MMHG | SYSTOLIC BLOOD PRESSURE: 130 MMHG | RESPIRATION RATE: 24 BRPM | BODY MASS INDEX: 45.65 KG/M2

## 2019-12-09 DIAGNOSIS — R30.0 DYSURIA: Primary | ICD-10-CM

## 2019-12-09 DIAGNOSIS — R10.9 RIGHT FLANK PAIN: ICD-10-CM

## 2019-12-09 LAB
SL AMB  POCT GLUCOSE, UA: ABNORMAL
SL AMB LEUKOCYTE ESTERASE,UA: ABNORMAL
SL AMB POCT BILIRUBIN,UA: ABNORMAL
SL AMB POCT BLOOD,UA: ABNORMAL
SL AMB POCT CLARITY,UA: ABNORMAL
SL AMB POCT COLOR,UA: YELLOW
SL AMB POCT KETONES,UA: ABNORMAL
SL AMB POCT NITRITE,UA: ABNORMAL
SL AMB POCT PH,UA: 6
SL AMB POCT SPECIFIC GRAVITY,UA: 1.01
SL AMB POCT URINE HCG: NEGATIVE
SL AMB POCT URINE PROTEIN: ABNORMAL
SL AMB POCT UROBILINOGEN: 0.2

## 2019-12-09 PROCEDURE — 99214 OFFICE O/P EST MOD 30 MIN: CPT | Performed by: FAMILY MEDICINE

## 2019-12-09 PROCEDURE — 81025 URINE PREGNANCY TEST: CPT | Performed by: FAMILY MEDICINE

## 2019-12-09 PROCEDURE — 81003 URINALYSIS AUTO W/O SCOPE: CPT | Performed by: FAMILY MEDICINE

## 2019-12-09 PROCEDURE — 87086 URINE CULTURE/COLONY COUNT: CPT | Performed by: FAMILY MEDICINE

## 2019-12-09 PROCEDURE — 3008F BODY MASS INDEX DOCD: CPT | Performed by: FAMILY MEDICINE

## 2019-12-09 RX ORDER — OXYCODONE HYDROCHLORIDE AND ACETAMINOPHEN 5; 325 MG/1; MG/1
1 TABLET ORAL EVERY 6 HOURS PRN
Qty: 12 TABLET | Refills: 0 | Status: SHIPPED | OUTPATIENT
Start: 2019-12-09 | End: 2021-10-12

## 2019-12-09 RX ORDER — SULFAMETHOXAZOLE AND TRIMETHOPRIM 800; 160 MG/1; MG/1
1 TABLET ORAL EVERY 12 HOURS SCHEDULED
Qty: 10 TABLET | Refills: 0 | Status: SHIPPED | OUTPATIENT
Start: 2019-12-09 | End: 2019-12-14

## 2019-12-09 NOTE — PROGRESS NOTES
Assessment/Plan:       Diagnoses and all orders for this visit:    Dysuria:  · Urine dip showed trace leukocytes, negative nitrites  · Considering patient is having symptoms and pain, prescribed Bactrim twice daily for 5 days  · Urine pregnancy test in office negative  · Send urine for culture  -     sulfamethoxazole-trimethoprim (BACTRIM DS) 800-160 mg per tablet; Take 1 tablet by mouth every 12 (twelve) hours for 5 days  -     Urine culture; Future  -     POCT urine dip auto non-scope  -     POCT urine HCG  -     Urine culture    Right flank pain:  · Has history of right ureteral stone and stent placed in 2018  · Patient feels pain same as she did have before  · Pain not getting better with NSAIDs  · Prescribed short course of Percocet (for 3 days) for severe pain  Checked PDMP  · Ordered ultrasound renal check for kidney stone  · Return to ER and office precautions discussed with patient    -     oxyCODONE-acetaminophen (PERCOCET) 5-325 mg per tablet; Take 1 tablet by mouth every 6 (six) hours as needed for moderate pain or severe painMax Daily Amount: 4 tablets  -     US retroperitoneal complete; Future  -     POCT urine dip auto non-scope  -     POCT urine HCG          Subjective:      Patient ID: Zachariah Lopez is a 36 y o  female here for urinary frequency, burning urination, flank pain  Urinary Frequency    This is a new problem  Episode onset: 2-3 days  The problem occurs every urination  The problem has been rapidly worsening  The quality of the pain is described as burning  The pain is at a severity of 6/10  The pain is moderate  There has been no fever  Associated symptoms include flank pain, frequency and urgency  Pertinent negatives include no chills, discharge, hematuria, nausea, possible pregnancy or vomiting  She has tried nothing for the symptoms  Her past medical history is significant for kidney stones  Flank Pain   This is a new problem  Episode onset: 2-3 days   The problem occurs intermittently  The problem is unchanged  The pain is present in the lumbar spine  The quality of the pain is described as aching  Radiates to: to the front  (to abdomen) The pain is at a severity of 9/10  The pain is moderate  The symptoms are aggravated by position  Associated symptoms include abdominal pain, dysuria and pelvic pain  Pertinent negatives include no bladder incontinence, bowel incontinence, chest pain or fever  She has tried NSAIDs (Motrin 800 mg, aleve 500 mg) for the symptoms  The treatment provided no relief  LMP: Ended last week  Sexually actve, no contraceptions  The following portions of the patient's history were reviewed and updated as appropriate: allergies, current medications, past family history, past medical history, past social history, past surgical history and problem list     Review of Systems   Constitutional: Negative for chills and fever  Cardiovascular: Negative for chest pain, palpitations and leg swelling  Gastrointestinal: Positive for abdominal pain  Negative for bowel incontinence, nausea and vomiting  Genitourinary: Positive for dysuria, flank pain, frequency, pelvic pain and urgency  Negative for bladder incontinence, decreased urine volume, hematuria, vaginal bleeding, vaginal discharge and vaginal pain  Objective:      /80 (BP Location: Left arm, Patient Position: Sitting, Cuff Size: Large)   Pulse 78   Temp 98 2 °F (36 8 °C) (Tympanic)   Resp (!) 24   Ht 5' 4 4" (1 636 m)   Wt 121 kg (267 lb 6 4 oz)   BMI 45 33 kg/m²          Physical Exam   Constitutional: She appears well-developed and well-nourished  HENT:   Head: Normocephalic  Neck: Neck supple  Cardiovascular: Normal rate, regular rhythm, normal heart sounds and intact distal pulses  Pulmonary/Chest: Effort normal and breath sounds normal    Abdominal: Soft  Bowel sounds are normal  There is tenderness  There is no rebound and no guarding     Mild pelvic tenderness, moderate right flank tenderness  Musculoskeletal: Normal range of motion  Neurological: She is alert  Skin: Skin is warm

## 2019-12-10 ENCOUNTER — APPOINTMENT (EMERGENCY)
Dept: RADIOLOGY | Facility: HOSPITAL | Age: 34
End: 2019-12-10
Payer: COMMERCIAL

## 2019-12-10 ENCOUNTER — HOSPITAL ENCOUNTER (EMERGENCY)
Facility: HOSPITAL | Age: 34
Discharge: HOME | End: 2019-12-10
Attending: EMERGENCY MEDICINE | Admitting: EMERGENCY MEDICINE
Payer: COMMERCIAL

## 2019-12-10 VITALS
DIASTOLIC BLOOD PRESSURE: 79 MMHG | WEIGHT: 260 LBS | SYSTOLIC BLOOD PRESSURE: 140 MMHG | RESPIRATION RATE: 16 BRPM | BODY MASS INDEX: 44.08 KG/M2 | TEMPERATURE: 98.5 F | OXYGEN SATURATION: 99 % | HEART RATE: 68 BPM

## 2019-12-10 DIAGNOSIS — R10.9 RIGHT FLANK PAIN: ICD-10-CM

## 2019-12-10 DIAGNOSIS — N20.1 URETEROLITHIASIS: Primary | ICD-10-CM

## 2019-12-10 LAB
ANION GAP SERPL CALCULATED.3IONS-SCNC: 6 MMOL/L (ref 4–13)
BACTERIA UR CULT: NORMAL
BUN SERPL-MCNC: 11 MG/DL (ref 5–25)
CALCIUM SERPL-MCNC: 9.5 MG/DL (ref 8.3–10.1)
CHLORIDE SERPL-SCNC: 110 MMOL/L (ref 100–108)
CO2 SERPL-SCNC: 24 MMOL/L (ref 21–32)
CREAT SERPL-MCNC: 0.8 MG/DL (ref 0.6–1.3)
GFR SERPL CREATININE-BSD FRML MDRD: 97 ML/MIN/1.73SQ M
GLUCOSE SERPL-MCNC: 91 MG/DL (ref 65–140)
POTASSIUM SERPL-SCNC: 3.7 MMOL/L (ref 3.5–5.3)
SODIUM SERPL-SCNC: 140 MMOL/L (ref 136–145)

## 2019-12-10 PROCEDURE — 80048 BASIC METABOLIC PNL TOTAL CA: CPT | Performed by: EMERGENCY MEDICINE

## 2019-12-10 PROCEDURE — 99284 EMERGENCY DEPT VISIT MOD MDM: CPT | Performed by: EMERGENCY MEDICINE

## 2019-12-10 PROCEDURE — 74176 CT ABD & PELVIS W/O CONTRAST: CPT

## 2019-12-10 PROCEDURE — 99284 EMERGENCY DEPT VISIT MOD MDM: CPT

## 2019-12-10 PROCEDURE — 96372 THER/PROPH/DIAG INJ SC/IM: CPT

## 2019-12-10 PROCEDURE — 36415 COLL VENOUS BLD VENIPUNCTURE: CPT | Performed by: EMERGENCY MEDICINE

## 2019-12-10 RX ORDER — ONDANSETRON 4 MG/1
4 TABLET, ORALLY DISINTEGRATING ORAL ONCE
Status: DISCONTINUED | OUTPATIENT
Start: 2019-12-10 | End: 2019-12-10

## 2019-12-10 RX ORDER — METOCLOPRAMIDE HYDROCHLORIDE 5 MG/ML
10 INJECTION INTRAMUSCULAR; INTRAVENOUS ONCE
Status: COMPLETED | OUTPATIENT
Start: 2019-12-10 | End: 2019-12-10

## 2019-12-10 RX ORDER — KETOROLAC TROMETHAMINE 30 MG/ML
15 INJECTION, SOLUTION INTRAMUSCULAR; INTRAVENOUS ONCE
Status: COMPLETED | OUTPATIENT
Start: 2019-12-10 | End: 2019-12-10

## 2019-12-10 RX ORDER — MORPHINE SULFATE 15 MG/1
7.5 TABLET ORAL ONCE
Status: COMPLETED | OUTPATIENT
Start: 2019-12-10 | End: 2019-12-10

## 2019-12-10 RX ORDER — IBUPROFEN 400 MG/1
400 TABLET ORAL EVERY 6 HOURS SCHEDULED
Qty: 30 TABLET | Refills: 0 | Status: SHIPPED | OUTPATIENT
Start: 2019-12-10 | End: 2020-04-14

## 2019-12-10 RX ORDER — MORPHINE SULFATE 15 MG/1
7.5 TABLET ORAL EVERY 4 HOURS PRN
Qty: 4 TABLET | Refills: 0 | Status: SHIPPED | OUTPATIENT
Start: 2019-12-10 | End: 2021-10-12

## 2019-12-10 RX ADMIN — KETOROLAC TROMETHAMINE 15 MG: 30 INJECTION, SOLUTION INTRAMUSCULAR at 10:52

## 2019-12-10 RX ADMIN — MORPHINE SULFATE 7.5 MG: 15 TABLET ORAL at 12:41

## 2019-12-10 RX ADMIN — METOCLOPRAMIDE 10 MG: 5 INJECTION, SOLUTION INTRAMUSCULAR; INTRAVENOUS at 11:02

## 2019-12-10 NOTE — ED NOTES
Patient transported to [facility] via wheelchair     [place], [address] [facility]  12/10/19 5712

## 2019-12-10 NOTE — ED PROVIDER NOTES
History  Chief Complaint   Patient presents with    Flank Pain     here with R flank pain, started about 2-3 days ago  PCP prescribed her Percocet and pt has been taking them without relief  Notes she was recently dx with UTI  59-year-old female presents for evaluation of right flank pain acute onset 2-3 days ago, progressively worse since onset  Has been taking Motrin and Percocet without relief  Pain radiates to her right groin and feel similar to prior kidney stones she has had in the past   She went to her primary physician and had a urinalysis yesterday was presumptively treated for a urinary tract infection with with the patient remembers as Macrobid  No alleviating or exacerbating factors patient reports associated nausea and nonbilious nonbloody emesis  Denies fever, chills, dysuria  No other complaints at this time  Impression:  Right flank pain radiating to her groin likely stone versus infection  Plan:  CT stone study, I reviewed the urinalysis that was performed yesterday which showed trace leuks and trace blood the culture is still pending  I did not suspect that repeating the urinalysis will change patient's course  Prior to Admission Medications   Prescriptions Last Dose Informant Patient Reported? Taking?    ALPRAZolam (XANAX) 0 5 mg tablet  Self No No   Sig: Take 1 tablet (0 5 mg total) by mouth as needed for anxiety   escitalopram (LEXAPRO) 10 mg tablet  Self No No   Sig: Take 1 tablet (10 mg total) by mouth daily   hydrOXYzine HCL (ATARAX) 25 mg tablet  Self No No   Sig: Take 1 tablet (25 mg total) by mouth every 6 (six) hours as needed for itching   oxyCODONE-acetaminophen (PERCOCET) 5-325 mg per tablet   No No   Sig: Take 1 tablet by mouth every 6 (six) hours as needed for moderate pain or severe painMax Daily Amount: 4 tablets   sulfamethoxazole-trimethoprim (BACTRIM DS) 800-160 mg per tablet   No No   Sig: Take 1 tablet by mouth every 12 (twelve) hours for 5 days Facility-Administered Medications: None       Past Medical History:   Diagnosis Date    Anesthesia complication     oxygen levels drop - always needs oxygen    Anxiety     Asthma     not on medications    Chronic hypertension in obstetric context in second trimester 10/16/2018    Depression     Gestational diabetes     Hypertension     Iron deficiency anemia secondary to inadequate dietary iron intake 3/14/2019    Kidney stone     Motion sickness     PONV (postoperative nausea and vomiting)     "always needs oxygen after  "    Scaly patch rash     UTI (urinary tract infection)     Varicella     had as a child    Wears glasses        Past Surgical History:   Procedure Laterality Date    CYSTOSCOPY      [place] CYSTO/URETERO W/LITHOTRIPSY &INDWELL STENT INSRT Right 2018    Procedure: CYSTOSCOPY URETEROSCOPY WITH LITHOTRIPSY HOLMIUM LASER, RETROGRADE PYELOGRAM AND INSERTION STENT URETERAL;  Surgeon: Su Stock MD;  [facility];  Service: Urology    [place] CYSTO/URETERO W/LITHOTRIPSY &INDWELL STENT INSRT Right 2018    Procedure: CYSTO, URETEROSCOPY, RETROGRADE PYELOGRAM, STENT REMOVAL,STENT INSERTION,STONE EXTRACTION WITH BASKET;  Surgeon: Angelique Colvin MD;  [facility];  Service: Urology    RENAL ARTERY STENT         Family History   Problem Relation Age of Onset    Diabetes Mother     Hypertension Mother     Muscular dystrophy Family      I have reviewed and agree with the history as documented  Social History     Tobacco Use    Smoking status: Current Every Day Smoker     Packs/day: 0 20     Types: Cigarettes     Last attempt to quit: 2018     Years since quittin 2    Smokeless tobacco: Never Used   Substance Use Topics    Alcohol use: No    Drug use: No        Review of Systems   Constitutional: Negative for chills and fever  Respiratory: Negative for shortness of breath  Cardiovascular: Negative for chest pain     Gastrointestinal: Positive for nausea and vomiting  Genitourinary: Positive for flank pain  Negative for dysuria and frequency  Musculoskeletal: Positive for back pain  Negative for arthralgias  Skin: Negative for rash  Neurological: Negative for syncope and weakness  All other systems reviewed and are negative  Physical Exam  Physical Exam   Constitutional: She is oriented to person, place, and time  She appears well-developed and well-nourished  No distress  HENT:   Head: Normocephalic and atraumatic  Mouth/Throat: Oropharynx is clear and moist    Eyes: Pupils are equal, round, and reactive to light  Conjunctivae and EOM are normal  Right eye exhibits no discharge  Left eye exhibits no discharge  No scleral icterus  Neck: Normal range of motion  Neck supple  Cardiovascular: Normal rate, regular rhythm, normal heart sounds and intact distal pulses  Exam reveals no gallop and no friction rub  No murmur heard  Pulmonary/Chest: Effort normal and breath sounds normal  No respiratory distress  She has no wheezes  She has no rales  Abdominal: Soft  Bowel sounds are normal  She exhibits no distension  There is tenderness  There is no guarding  Mild TTP RLQ, suprapubic   Musculoskeletal: Normal range of motion  She exhibits no edema or deformity  Neurological: She is alert and oriented to person, place, and time  No sensory deficit  She exhibits normal muscle tone  Skin: Capillary refill takes less than 2 seconds  No rash noted  She is not diaphoretic  No erythema  Nursing note and vitals reviewed        Vital Signs  ED Triage Vitals   Temperature Pulse Respirations Blood Pressure SpO2   12/10/19 0851 12/10/19 0851 12/10/19 0851 12/10/19 0851 12/10/19 0851   98 5 °F (36 9 °C) 75 18 (!) 155/104 98 %      Temp Source Heart Rate Source Patient Position - Orthostatic VS BP Location FiO2 (%)   12/10/19 0851 12/10/19 1144 -- -- --   Oral Monitor         Pain Score       12/10/19 0851       Worst Possible Pain Vitals:    12/10/19 0851 12/10/19 1144 12/10/19 1348   BP: (!) 155/104 132/89 140/79   Pulse: 75 65 68         Visual Acuity      ED Medications  Medications   ketorolac (TORADOL) injection 15 mg (15 mg Intramuscular Given 12/10/19 1052)   metoclopramide (REGLAN) injection 10 mg (10 mg Intramuscular Given 12/10/19 1102)   morphine (MSIR) IR tablet 7 5 mg (7 5 mg Oral Given 12/10/19 1241)       Diagnostic Studies  Results Reviewed     Procedure Component Value Units Date/Time    Basic metabolic panel [[id number]]  (Abnormal) Collected:  12/10/19 1244    Lab Status:  Final result Specimen:  Blood from Arm, Right Updated:  12/10/19 1310     Sodium 140 mmol/L      Potassium 3 7 mmol/L      Chloride 110 mmol/L      CO2 24 mmol/L      ANION GAP 6 mmol/L      BUN 11 mg/dL      Creatinine 0 80 mg/dL      Glucose 91 mg/dL      Calcium 9 5 mg/dL      eGFR 97 ml/min/1 73sq m     Narrative:       [place] guidelines for Chronic Kidney Disease (CKD):     Stage 1 with normal or high GFR (GFR > 90 mL/min/1 73 square meters)    Stage 2 Mild CKD (GFR = 60-89 mL/min/1 73 square meters)    Stage 3A Moderate CKD (GFR = 45-59 mL/min/1 73 square meters)    Stage 3B Moderate CKD (GFR = 30-44 mL/min/1 73 square meters)    Stage 4 Severe CKD (GFR = 15-29 mL/min/1 73 square meters)    Stage 5 End Stage CKD (GFR <15 mL/min/1 73 square meters)  Note: GFR calculation is accurate only with a steady state creatinine                 CT renal stone study abdomen pelvis wo contrast   Final Result by Brielle Tomas MD (12/10 1229)      6 mm calculus just distal to the right UPJ with mild right hydronephrosis  Hepatic steatosis  Workstation performed: [id number]                    Procedures  Procedures         ED Course       informed of stone  Likely to pass, but possibility that it will not  Pain controlled  No LAYTON  Pt wishes to try outpt tx first, which is appropriate    RTED if pain uncontrolled or excessive vomiting  MDM      Disposition  Final diagnoses:   Ureterolithiasis   Right flank pain     Time reflects when diagnosis was documented in both MDM as applicable and the Disposition within this note     Time User Action Codes Description Comment    12/10/2019  1:35 PM Zaida Donohue Add [N20 1] Ureterolithiasis     12/10/2019  1:35 PM Zaida Donohue Add [R10 9] Right flank pain       ED Disposition     ED Disposition Condition Date/Time Comment    Discharge Stable Tue Dec 10, 2019  1:34 PM [facility] discharge to home/self care              Follow-up Information     Follow up With Specialties Details Why Contact Info Additional [address] [facility] Schedule an appointment as soon as possible for a visit in 1 week  [address] [facility] [address] [place] [phone]  [address] [place] [address] For Urology [place], [address] [place] [address] [address], [place], [place], [address]          Discharge Medication List as of 12/10/2019  1:36 PM      START taking these medications    Details   morphine (MSIR) 15 mg tablet Take 0 5 tablets (7 5 mg total) by mouth every 4 (four) hours as needed for severe painMax Daily Amount: 45 mg, Starting Tue 12/10/2019, Normal         CONTINUE these medications which have NOT CHANGED    Details   ALPRAZolam (XANAX) 0 5 mg tablet Take 1 tablet (0 5 mg total) by mouth as needed for anxiety, Starting Thu 12/5/2019, Normal      escitalopram (LEXAPRO) 10 mg tablet Take 1 tablet (10 mg total) by mouth daily, Starting Thu 12/5/2019, Normal      hydrOXYzine HCL (ATARAX) 25 mg tablet Take 1 tablet (25 mg total) by mouth every 6 (six) hours as needed for itching, Starting Thu 12/5/2019, Normal      oxyCODONE-acetaminophen (PERCOCET) 5-325 mg per tablet Take 1 tablet by mouth every 6 (six) hours as needed for moderate pain or severe painMax Daily Amount: 4 tablets, Starting Mon 12/9/2019, Normal      sulfamethoxazole-trimethoprim (BACTRIM DS) 800-160 mg per tablet Take 1 tablet by mouth every 12 (twelve) hours for 5 days, Starting Mon 12/9/2019, Until Sat 12/14/2019, Normal           No discharge procedures on file      ED Provider  Electronically Signed by           Sharon Donohue DO  12/10/19 9737

## 2019-12-18 ENCOUNTER — TELEPHONE (OUTPATIENT)
Dept: FAMILY MEDICINE CLINIC | Facility: CLINIC | Age: 34
End: 2019-12-18

## 2019-12-18 NOTE — TELEPHONE ENCOUNTER
Patient was treated here on 12/9 for dysuria, prescribed Bactrim  She went to Er on 12/10/19 for ureterolithiasis, are we able to offer work excuse?

## 2019-12-18 NOTE — TELEPHONE ENCOUNTER
Thanks for info  I would appreciate clarification  Has she returned to work and if so on what day? If Ms Yolanda Worthington needs a note saving it is OK for her to return to work, yes, I can provide this letter  And I am not able to say she has been out of work for 9 days due to illness  If she has been out of work this long due to pain, please have her be seen by clinician to better document her needs     Thanks,

## 2019-12-18 NOTE — TELEPHONE ENCOUNTER
Patient seen on 12/9 by Dr Nava Comes for dysuria  She went to ER on 12/10/19 where she received further treatment  She wants to go back to work tomorrow, 12/19 and her employer is asking for a letter stating she is able to go back

## 2020-01-01 DIAGNOSIS — F41.8 DEPRESSION WITH ANXIETY: ICD-10-CM

## 2020-01-01 RX ORDER — ESCITALOPRAM OXALATE 10 MG/1
TABLET ORAL
Qty: 30 TABLET | Refills: 0 | Status: SHIPPED | OUTPATIENT
Start: 2020-01-01 | End: 2020-04-14 | Stop reason: SDUPTHER

## 2020-04-14 ENCOUNTER — TELEPHONE (OUTPATIENT)
Dept: FAMILY MEDICINE CLINIC | Facility: CLINIC | Age: 35
End: 2020-04-14

## 2020-04-14 ENCOUNTER — TELEMEDICINE (OUTPATIENT)
Dept: FAMILY MEDICINE CLINIC | Facility: CLINIC | Age: 35
End: 2020-04-14

## 2020-04-14 DIAGNOSIS — F41.9 ANXIETY AND DEPRESSION: Primary | ICD-10-CM

## 2020-04-14 DIAGNOSIS — F32.A ANXIETY AND DEPRESSION: Primary | ICD-10-CM

## 2020-04-14 DIAGNOSIS — F41.8 DEPRESSION WITH ANXIETY: ICD-10-CM

## 2020-04-14 PROCEDURE — T1015 CLINIC SERVICE: HCPCS | Performed by: FAMILY MEDICINE

## 2020-04-14 PROCEDURE — G2012 BRIEF CHECK IN BY MD/QHP: HCPCS | Performed by: FAMILY MEDICINE

## 2020-04-14 RX ORDER — ESCITALOPRAM OXALATE 10 MG/1
10 TABLET ORAL DAILY
Qty: 90 TABLET | Refills: 1 | Status: SHIPPED | OUTPATIENT
Start: 2020-04-14 | End: 2020-10-28

## 2020-04-14 RX ORDER — HYDROXYZINE HYDROCHLORIDE 25 MG/1
25 TABLET, FILM COATED ORAL EVERY 6 HOURS PRN
Qty: 30 TABLET | Refills: 0 | Status: SHIPPED | OUTPATIENT
Start: 2020-04-14 | End: 2020-10-28 | Stop reason: SDUPTHER

## 2020-08-21 ENCOUNTER — TELEMEDICINE (OUTPATIENT)
Dept: FAMILY MEDICINE CLINIC | Facility: CLINIC | Age: 35
End: 2020-08-21

## 2020-08-21 ENCOUNTER — TELEPHONE (OUTPATIENT)
Dept: FAMILY MEDICINE CLINIC | Facility: CLINIC | Age: 35
End: 2020-08-21

## 2020-08-21 DIAGNOSIS — R51.9 ACUTE NONINTRACTABLE HEADACHE, UNSPECIFIED HEADACHE TYPE: ICD-10-CM

## 2020-08-21 DIAGNOSIS — J45.909 ASTHMA, UNSPECIFIED ASTHMA SEVERITY, UNSPECIFIED WHETHER COMPLICATED, UNSPECIFIED WHETHER PERSISTENT: Primary | ICD-10-CM

## 2020-08-21 DIAGNOSIS — J06.9 VIRAL UPPER RESPIRATORY TRACT INFECTION: ICD-10-CM

## 2020-08-21 PROCEDURE — 99213 OFFICE O/P EST LOW 20 MIN: CPT | Performed by: FAMILY MEDICINE

## 2020-08-21 RX ORDER — IBUPROFEN 200 MG
200 TABLET ORAL EVERY 6 HOURS PRN
Qty: 30 TABLET | Refills: 1 | Status: SHIPPED | OUTPATIENT
Start: 2020-08-21 | End: 2020-10-28

## 2020-08-21 RX ORDER — ALBUTEROL SULFATE 90 UG/1
2 AEROSOL, METERED RESPIRATORY (INHALATION) EVERY 6 HOURS PRN
Qty: 1 INHALER | Refills: 2 | Status: SHIPPED | OUTPATIENT
Start: 2020-08-21

## 2020-08-21 RX ORDER — ALBUTEROL SULFATE 2.5 MG/3ML
2.5 SOLUTION RESPIRATORY (INHALATION) EVERY 6 HOURS PRN
Qty: 30 VIAL | Refills: 1 | Status: SHIPPED | OUTPATIENT
Start: 2020-08-21

## 2020-08-21 NOTE — PROGRESS NOTES
Virtual Regular Visit      Assessment/Plan:    Problem List Items Addressed This Visit        Respiratory    Viral upper respiratory tract infection     Likely URI given headache, nasal congestion, cough, and chest tightness  No sick contacts or Covid19 exposure  Will give Motrin for headache  Albuterol refilled for her asthma  Advised to go to [facility] for fever, worsening symptoms or SOB  Other Visit Diagnoses     Asthma, unspecified asthma severity, unspecified whether complicated, unspecified whether persistent    -  Primary    Relevant Medications    albuterol (2 5 mg/3 mL) 0 083 % nebulizer solution    albuterol (Ventolin HFA) 90 mcg/act inhaler    Acute nonintractable headache, unspecified headache type        Relevant Medications    ibuprofen (MOTRIN) 200 mg tablet               Reason for visit is   Chief Complaint   Patient presents with    Virtual Regular Visit        Encounter provider You Garay MD    Provider located at [address]   [phone]      Recent Visits  No visits were found meeting these conditions  Showing recent visits within past 7 days and meeting all other requirements     Today's Visits  Date Type Provider Dept   [facility]   08/21/20 Yvonne Castillo MD [facility]   Showing today's visits and meeting all other requirements     Future Appointments  No visits were found meeting these conditions  Showing future appointments within next 150 days and meeting all other requirements        The patient was identified by name and date of birth  Tadeo Sintia was informed that this is a telemedicine visit and that the visit is being conducted through [employer]  My office door was closed  No one else was in the room  She acknowledged consent and understanding of privacy and security of the video platform   The patient has agreed to participate and understands they can discontinue the visit at any time  Patient is aware this is a billable service  Haleigh Mclean is a 36 y o  female with unknown Hx of asthma calling to office today, c/o headache, nasal congestion, cough, and chest tightness  Denies fevers, chills, malaise, N/V, loss of taste or smell, or diarrhea  Patient states that she has history of asthma, frequent bronchitis which required Z-melo  She took Albuterol neb last night with some relief, and wants to refill Alb solution and inhaler  Denies wheezing, or SOB  Current smoker, 2-3 cigarettes every other day             Past Medical History:   Diagnosis Date    Anesthesia complication     oxygen levels drop - always needs oxygen    Anxiety     Asthma     not on medications    Chronic hypertension in obstetric context in second trimester 10/16/2018    Depression     Gestational diabetes     Hypertension     Iron deficiency anemia secondary to inadequate dietary iron intake 3/14/2019    Kidney stone     Motion sickness     PONV (postoperative nausea and vomiting)     "always needs oxygen after  "    Scaly patch rash     UTI (urinary tract infection)     Varicella     had as a child    Wears glasses        Past Surgical History:   Procedure Laterality Date    CYSTOSCOPY      [place] CYSTO/URETERO W/LITHOTRIPSY &INDWELL STENT INSRT Right 6/12/2018    Procedure: CYSTOSCOPY URETEROSCOPY WITH LITHOTRIPSY HOLMIUM LASER, RETROGRADE PYELOGRAM AND INSERTION STENT URETERAL;  Surgeon: Payal Valdez MD;  [facility];  Service: Urology    [place] CYSTO/URETERO W/LITHOTRIPSY &INDWELL STENT INSRT Right 7/17/2018    Procedure: CYSTO, URETEROSCOPY, RETROGRADE PYELOGRAM, STENT REMOVAL,STENT INSERTION,STONE EXTRACTION WITH BASKET;  Surgeon: Rohith Soriano MD;  [facility];  Service: Urology    RENAL ARTERY STENT  2015       Current Outpatient Medications   Medication Sig Dispense Refill    albuterol (2 5 mg/3 mL) 0 083 % nebulizer solution Take 1 vial (2 5 mg total) by nebulization every 6 (six) hours as needed for wheezing or shortness of breath 30 vial 1    albuterol (Ventolin HFA) 90 mcg/act inhaler Inhale 2 puffs every 6 (six) hours as needed for wheezing 1 Inhaler 2    ALPRAZolam (XANAX) 0 5 mg tablet Take 1 tablet (0 5 mg total) by mouth as needed for anxiety 5 tablet 0    escitalopram (LEXAPRO) 10 mg tablet Take 1 tablet (10 mg total) by mouth daily 90 tablet 1    hydrOXYzine HCL (ATARAX) 25 mg tablet Take 1 tablet (25 mg total) by mouth every 6 (six) hours as needed for itching 30 tablet 0    ibuprofen (MOTRIN) 200 mg tablet Take 1 tablet (200 mg total) by mouth every 6 (six) hours as needed for headaches for up to 15 days 30 tablet 1    morphine (MSIR) 15 mg tablet Take 0 5 tablets (7 5 mg total) by mouth every 4 (four) hours as needed for severe painMax Daily Amount: 45 mg 4 tablet 0    oxyCODONE-acetaminophen (PERCOCET) 5-325 mg per tablet Take 1 tablet by mouth every 6 (six) hours as needed for moderate pain or severe painMax Daily Amount: 4 tablets 12 tablet 0     No current facility-administered medications for this visit  Allergies   Allergen Reactions    Dilaudid [Hydromorphone Hcl] Vomiting    Flomax [Tamsulosin] Hives    Macrobid [Nitrofurantoin] Hives    Penicillins Hives and Other (See Comments)    Tramadol GI Intolerance     nausea       Review of Systems   Constitutional: Negative for appetite change, chills and fever  HENT: Positive for congestion  Negative for rhinorrhea and sore throat  Respiratory: Positive for cough and chest tightness  Negative for shortness of breath  Cardiovascular: Negative for chest pain and palpitations  Gastrointestinal: Negative for abdominal pain, diarrhea, nausea and vomiting  Genitourinary: Negative for dysuria  Musculoskeletal: Negative for myalgias  Neurological: Positive for headaches  Psychiatric/Behavioral: Negative for agitation and behavioral problems  All other systems reviewed and are negative  Video Exam    There were no vitals filed for this visit  Physical Exam  Constitutional:       General: She is not in acute distress  Appearance: Normal appearance  HENT:      Nose: Nose normal  No rhinorrhea  Eyes:      General:         Right eye: No discharge  Left eye: No discharge  Pulmonary:      Effort: Pulmonary effort is normal  No respiratory distress  Neurological:      Mental Status: She is alert  I spent 15 minutes directly with the patient during this visit      Andrew Gilbert acknowledges that she has consented to an online visit or consultation  She understands that the online visit is based solely on information provided by her, and that, in the absence of a face-to-face physical evaluation by the physician, the diagnosis she receives is both limited and provisional in terms of accuracy and completeness  This is not intended to replace a full medical face-to-face evaluation by the physician  Kaye Hampton understands and accepts these terms

## 2020-08-21 NOTE — ASSESSMENT & PLAN NOTE
Likely URI given headache, nasal congestion, cough, and chest tightness  No sick contacts or Covid19 exposure  Will give Motrin for headache  Albuterol refilled for her asthma  Advised to go to [facility] for fever, worsening symptoms or SOB

## 2020-09-30 ENCOUNTER — TELEPHONE (OUTPATIENT)
Dept: FAMILY MEDICINE CLINIC | Facility: CLINIC | Age: 35
End: 2020-09-30

## 2020-09-30 NOTE — TELEPHONE ENCOUNTER
----- Message from Luigi Vaughn MD sent at 9/29/2020  4:45 PM EDT -----  Kindly schedule pt for virtual visit  Phone got disconnected half way through the encounter and we could not complete the visit  All attempts to reconnect proved futile  I left multiple VM and lastly left VM stating visit will need to be re-scheduled        Thanks

## 2020-10-28 ENCOUNTER — TELEMEDICINE (OUTPATIENT)
Dept: FAMILY MEDICINE CLINIC | Facility: CLINIC | Age: 35
End: 2020-10-28

## 2020-10-28 ENCOUNTER — PATIENT OUTREACH (OUTPATIENT)
Dept: FAMILY MEDICINE CLINIC | Facility: CLINIC | Age: 35
End: 2020-10-28

## 2020-10-28 DIAGNOSIS — F41.8 DEPRESSION WITH ANXIETY: Primary | ICD-10-CM

## 2020-10-28 PROCEDURE — 99213 OFFICE O/P EST LOW 20 MIN: CPT | Performed by: PHYSICIAN ASSISTANT

## 2020-10-28 PROCEDURE — 3725F SCREEN DEPRESSION PERFORMED: CPT | Performed by: PHYSICIAN ASSISTANT

## 2020-10-28 RX ORDER — HYDROXYZINE HYDROCHLORIDE 25 MG/1
25 TABLET, FILM COATED ORAL EVERY 6 HOURS PRN
Qty: 30 TABLET | Refills: 0 | Status: SHIPPED | OUTPATIENT
Start: 2020-10-28 | End: 2021-04-07 | Stop reason: SDUPTHER

## 2020-10-29 ENCOUNTER — TELEPHONE (OUTPATIENT)
Dept: FAMILY MEDICINE CLINIC | Facility: CLINIC | Age: 35
End: 2020-10-29

## 2020-11-09 ENCOUNTER — PATIENT OUTREACH (OUTPATIENT)
Dept: FAMILY MEDICINE CLINIC | Facility: CLINIC | Age: 35
End: 2020-11-09

## 2020-11-24 ENCOUNTER — TELEMEDICINE (OUTPATIENT)
Dept: FAMILY MEDICINE CLINIC | Facility: CLINIC | Age: 35
End: 2020-11-24

## 2020-11-24 DIAGNOSIS — Z20.822 EXPOSURE TO COVID-19 VIRUS: Primary | ICD-10-CM

## 2020-11-24 DIAGNOSIS — Z20.822 EXPOSURE TO COVID-19 VIRUS: ICD-10-CM

## 2020-11-24 PROCEDURE — G2012 BRIEF CHECK IN BY MD/QHP: HCPCS | Performed by: FAMILY MEDICINE

## 2020-11-24 PROCEDURE — U0003 INFECTIOUS AGENT DETECTION BY NUCLEIC ACID (DNA OR RNA); SEVERE ACUTE RESPIRATORY SYNDROME CORONAVIRUS 2 (SARS-COV-2) (CORONAVIRUS DISEASE [COVID-19]), AMPLIFIED PROBE TECHNIQUE, MAKING USE OF HIGH THROUGHPUT TECHNOLOGIES AS DESCRIBED BY CMS-2020-01-R: HCPCS | Performed by: FAMILY MEDICINE

## 2020-11-25 LAB — SARS-COV-2 RNA SPEC QL NAA+PROBE: NOT DETECTED

## 2020-11-30 ENCOUNTER — TELEPHONE (OUTPATIENT)
Dept: FAMILY MEDICINE CLINIC | Facility: CLINIC | Age: 35
End: 2020-11-30

## 2021-04-01 ENCOUNTER — TELEPHONE (OUTPATIENT)
Dept: FAMILY MEDICINE CLINIC | Facility: CLINIC | Age: 36
End: 2021-04-01

## 2021-04-01 NOTE — TELEPHONE ENCOUNTER
Pt requesting refill of Atarax, did tell her she may need appt prior but she wanted me to send message first

## 2021-04-07 DIAGNOSIS — F41.8 DEPRESSION WITH ANXIETY: ICD-10-CM

## 2021-04-07 RX ORDER — HYDROXYZINE HYDROCHLORIDE 25 MG/1
25 TABLET, FILM COATED ORAL EVERY 6 HOURS PRN
Qty: 30 TABLET | Refills: 0 | Status: SHIPPED | OUTPATIENT
Start: 2021-04-07 | End: 2021-10-12 | Stop reason: SDUPTHER

## 2021-04-07 NOTE — TELEPHONE ENCOUNTER
Patient called again regarding refill  Clarified phon number (we had the wrong no)   She made appt tomorrow with Dr Nenita Martell (green team)

## 2021-10-12 ENCOUNTER — TELEMEDICINE (OUTPATIENT)
Dept: FAMILY MEDICINE CLINIC | Facility: CLINIC | Age: 36
End: 2021-10-12

## 2021-10-12 DIAGNOSIS — F41.8 DEPRESSION WITH ANXIETY: ICD-10-CM

## 2021-10-12 DIAGNOSIS — F41.9 ANXIETY AND DEPRESSION: Primary | ICD-10-CM

## 2021-10-12 DIAGNOSIS — F32.A ANXIETY AND DEPRESSION: Primary | ICD-10-CM

## 2021-10-12 PROCEDURE — 99213 OFFICE O/P EST LOW 20 MIN: CPT | Performed by: PHYSICIAN ASSISTANT

## 2021-10-12 RX ORDER — HYDROXYZINE HYDROCHLORIDE 25 MG/1
25 TABLET, FILM COATED ORAL EVERY 6 HOURS PRN
Qty: 30 TABLET | Refills: 0 | Status: SHIPPED | OUTPATIENT
Start: 2021-10-12 | End: 2022-04-15 | Stop reason: ALTCHOICE

## 2021-10-12 RX ORDER — ESCITALOPRAM OXALATE 10 MG/1
10 TABLET ORAL DAILY
Qty: 30 TABLET | Refills: 2 | Status: SHIPPED | OUTPATIENT
Start: 2021-10-12 | End: 2022-04-15 | Stop reason: SDUPTHER

## 2021-10-21 ENCOUNTER — TELEPHONE (OUTPATIENT)
Dept: FAMILY MEDICINE CLINIC | Facility: CLINIC | Age: 36
End: 2021-10-21

## 2021-12-14 ENCOUNTER — TELEPHONE (OUTPATIENT)
Dept: FAMILY MEDICINE CLINIC | Facility: CLINIC | Age: 36
End: 2021-12-14

## 2022-01-13 ENCOUNTER — DOCUMENTATION (OUTPATIENT)
Dept: FAMILY MEDICINE CLINIC | Facility: CLINIC | Age: 37
End: 2022-01-13

## 2022-01-13 ENCOUNTER — OFFICE VISIT (OUTPATIENT)
Dept: FAMILY MEDICINE CLINIC | Facility: CLINIC | Age: 37
End: 2022-01-13

## 2022-01-13 VITALS
DIASTOLIC BLOOD PRESSURE: 92 MMHG | TEMPERATURE: 97.8 F | RESPIRATION RATE: 18 BRPM | HEART RATE: 87 BPM | WEIGHT: 249.6 LBS | HEIGHT: 64 IN | OXYGEN SATURATION: 99 % | SYSTOLIC BLOOD PRESSURE: 142 MMHG | BODY MASS INDEX: 42.61 KG/M2
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
## 2022-01-13 DIAGNOSIS — S13.4XXA WHIPLASH INJURY TO NECK, INITIAL ENCOUNTER: Primary | ICD-10-CM

## 2022-01-13 PROCEDURE — 99213 OFFICE O/P EST LOW 20 MIN: CPT | Performed by: FAMILY MEDICINE

## 2022-01-13 RX ORDER — METHOCARBAMOL 500 MG/1
500 TABLET, FILM COATED ORAL 3 TIMES DAILY PRN
Qty: 30 TABLET | Refills: 0 | Status: SHIPPED | OUTPATIENT
Start: 2022-01-13

## 2022-01-13 RX ORDER — LIDOCAINE 40 MG/G
CREAM TOPICAL AS NEEDED
Qty: 30 G | Refills: 0 | Status: SHIPPED | OUTPATIENT
Start: 2022-01-13
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
## 2022-02-02 ENCOUNTER — OFFICE VISIT (OUTPATIENT)
Dept: FAMILY MEDICINE CLINIC | Facility: CLINIC | Age: 37
End: 2022-02-02

## 2022-02-02 VITALS
WEIGHT: 253.2 LBS | SYSTOLIC BLOOD PRESSURE: 122 MMHG | HEIGHT: 64 IN | BODY MASS INDEX: 43.23 KG/M2 | TEMPERATURE: 98.1 F | DIASTOLIC BLOOD PRESSURE: 78 MMHG | HEART RATE: 76 BPM | OXYGEN SATURATION: 98 % | RESPIRATION RATE: 18 BRPM

## 2022-02-02 DIAGNOSIS — M79.604 PAIN OF RIGHT LOWER EXTREMITY: Primary | ICD-10-CM

## 2022-02-02 PROBLEM — M79.606 LEG PAIN: Status: ACTIVE | Noted: 2022-02-02

## 2022-02-02 PROCEDURE — 99213 OFFICE O/P EST LOW 20 MIN: CPT | Performed by: FAMILY MEDICINE
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
## 2022-02-02 NOTE — ASSESSMENT & PLAN NOTE
- Reported having anterior leg pain started on Sunday  - Initially started with intense muscle cramping of the right leg  - Pain shooting from dorsum of right foot to mid shin region of the leg anteriorly   - Restricted in ROM specifically in dorsiflexion of the right foot   - Pt is not on oral contraceptive/hormonal agents   - Pt denied trauma to the leg, no skin break, no erythema, leg appeared soft, no edema noticed   - Negative Laurita sign and no long distant travel recently   - Pt denied adequate fluid intake due to nature of her work at the [employer]   - Pain improved with Motrin   - No weight bearing problem   Plan  - Continue Motrin PRN for pain relief   - Encourage getting adequate hydration with water and Gatorade   - Discuss with pt to contact office if sx persist for more than 1 week, will order CMP

## 2022-04-15 ENCOUNTER — OFFICE VISIT (OUTPATIENT)
Dept: FAMILY MEDICINE CLINIC | Facility: CLINIC | Age: 37
End: 2022-04-15

## 2022-04-15 VITALS
TEMPERATURE: 97.7 F | OXYGEN SATURATION: 98 % | WEIGHT: 234 LBS | SYSTOLIC BLOOD PRESSURE: 136 MMHG | RESPIRATION RATE: 18 BRPM | HEART RATE: 89 BPM | BODY MASS INDEX: 39.95 KG/M2 | HEIGHT: 64 IN | DIASTOLIC BLOOD PRESSURE: 90 MMHG

## 2022-04-15 DIAGNOSIS — F41.9 ANXIETY AND DEPRESSION: Primary | ICD-10-CM

## 2022-04-15 DIAGNOSIS — F32.A ANXIETY AND DEPRESSION: Primary | ICD-10-CM

## 2022-04-15 PROCEDURE — 99213 OFFICE O/P EST LOW 20 MIN: CPT | Performed by: FAMILY MEDICINE

## 2022-04-15 RX ORDER — BUSPIRONE HYDROCHLORIDE 5 MG/1
5 TABLET ORAL 2 TIMES DAILY
Qty: 30 TABLET | Refills: 3 | Status: SHIPPED | OUTPATIENT
Start: 2022-04-15

## 2022-04-15 RX ORDER — ESCITALOPRAM OXALATE 10 MG/1
10 TABLET ORAL DAILY
Qty: 30 TABLET | Refills: 2 | Status: SHIPPED | OUTPATIENT
Start: 2022-04-15

## 2022-04-15 NOTE — PROGRESS NOTES
Assessment/Plan:    Anxiety and depression  Acutely worsening anxiety and depression associated with recent social factors (separation from children's father who is an addict and recently incarcerated)   CONCHA-7 score 16 severe anxiety  PHQ9 score 12 moderate depression  Is affecting her ability to function at work and home   Denies thoughts of self harm and suicidal ideation   - pt previously on lexapro which helped but pt never increased dosage from 5mg to 10mg and in fact stopped taking a few months ago  Will restart lexapro at 10 mg  Heavily encouraged to take and educated on timeline that it may take up to 4-6 weeks to work  - Extensive education provided that it is a maintenance medication and very important she continues to take it even if she does not feel the effect immediately  Pt is anxious and does not like taking longterm medications but it is needed at this time  - reports atarax not helping and she does not like the way it makes her feel -> will switch to buspar 5 mg BID PRN, pt can uptitrate as needed to TID   - interested in establishing with counselor  At this time will refer to behavioral health first, pt interested in speaking with Dr Ke Smyth, and appreciate further recommendations for referral out if needed   - note provided for work  Pt wants time off work but states that they do not take FMLA(?)   - emotional support provided   - follow up in 1 month to assess medication tolerance and improvement        Diagnoses and all orders for this visit:    Anxiety and depression  -     busPIRone (BUSPAR) 5 mg tablet; Take 1 tablet (5 mg total) by mouth 2 (two) times a day  -     escitalopram (Lexapro) 10 mg tablet; Take 1 tablet (10 mg total) by mouth daily  -     Ambulatory Referral to [facility]; Future        Subjective:      Patient ID: Otoniel Stubbs is a 29 y o  female      PHQ-2/9 Depression Screening    Little interest or pleasure in doing things: 1 - several days  Feeling down, depressed, or hopeless: 1 - several days  Trouble falling or staying asleep, or sleeping too much: 2 - more than half the days  Feeling tired or having little energy: 1 - several days  Poor appetite or overeatin - more than half the days  Feeling bad about yourself - or that you are a failure or have let yourself or your family down: 1 - several days  Trouble concentrating on things, such as reading the newspaper or watching television: 2 - more than half the days  Moving or speaking so slowly that other people could have noticed  Or the opposite - being so fidgety or restless that you have been moving around a lot more than usual: 2 - more than half the days  Thoughts that you would be better off dead, or of hurting yourself in some way: 0 - not at all  PHQ-9 Score: 12   PHQ-9 Interpretation: Moderate depression        CONCHA-7 Flowsheet Screening      Most Recent Value   Over the last 2 weeks, how often have you been bothered by any of the following problems? Feeling nervous, anxious, or on edge 3   Not being able to stop or control worrying 3   Worrying too much about different things 3   Trouble relaxing 2   Being so restless that it is hard to sit still 2   Becoming easily annoyed or irritable 2   Feeling afraid as if something awful might happen 1   CONCHA-7 Total Score 16        Depression Screening Follow-up Plan: Patient's depression screening was positive with a PHQ-9 score 12  Patient assessed for underlying major depression  They have no active suicidal ideations  Brief counseling provided and recommend additional follow-up/re-evaluation next office visit  medication changes, referral to behavioral health  see A&P above     HPI  28 yo female presenting for f/u depression and anxiety  Reports a lot of social stress  Has 4 kids, taking care of  States that depression is getting very bad   from kids' father who was recently incarcerated and came out recently, addict, social issue   Also found friend's boyfriend passed via overdose  Pt reports depression and anxiety too much and overwhelmed at work  Was on lexapro and atarax as needed  Pt stopped lexapro because she felt it was not helping and that anxiety is more than depression  In fact previously when advised to increase lexapro to 10 mg pt states she never went up to 10 mg because she is sensitive to medications  States atarax does not help and she does not like that it makes her feel drowsy  Takes it at night because it helps her sleep  Pt feels like she worries about a lot of things  Feels the anxiety component is more than the depression  Feels like she has panic attacks, heavy feeling in chest and tightness, becomes jittery and anxious  Feels like they are coming more often and becoming worse  Is not seeing counselor or therapist  Previously saw one through her job  Then changed jobs and also felt like she didn't need it so didn't go back but is interested in speaking to someone now  Denies thoughts of self harm or suicidal ideation  The following portions of the patient's history were reviewed and updated as appropriate: allergies, current medications, past family history, past medical history, past social history, past surgical history and problem list     Review of Systems   Constitutional: Negative for chills and fever  HENT: Negative for congestion and rhinorrhea  Respiratory: Negative for cough and shortness of breath  Cardiovascular: Negative for chest pain and leg swelling  Gastrointestinal: Negative for abdominal pain, diarrhea, nausea and vomiting  Musculoskeletal: Negative for myalgias  Skin: Negative for rash and wound  Neurological: Negative for dizziness, light-headedness and headaches  Psychiatric/Behavioral: Positive for decreased concentration, dysphoric mood and sleep disturbance  Negative for self-injury and suicidal ideas  The patient is nervous/anxious            Objective:    /90 (BP Location: Left arm, Patient Position: Sitting, Cuff Size: Large)   Pulse 89   Temp 97 7 °F (36 5 °C) (Temporal)   Resp 18   Ht 5' 4 4" (1 636 m)   Wt 106 kg (234 lb)   SpO2 98%   BMI 39 67 kg/m²      Physical Exam  Vitals reviewed  Constitutional:       General: She is not in acute distress  Appearance: She is well-developed  HENT:      Head: Normocephalic and atraumatic  Eyes:      Extraocular Movements: Extraocular movements intact  Conjunctiva/sclera: Conjunctivae normal    Cardiovascular:      Rate and Rhythm: Normal rate and regular rhythm  Heart sounds: Normal heart sounds  No murmur heard  Pulmonary:      Effort: Pulmonary effort is normal  No respiratory distress  Breath sounds: Normal breath sounds  No wheezing or rales  Abdominal:      General: Bowel sounds are normal  There is no distension  Palpations: Abdomen is soft  Tenderness: There is no abdominal tenderness  Musculoskeletal:         General: No tenderness or deformity  Normal range of motion  Cervical back: Normal range of motion and neck supple  Right lower leg: No edema  Left lower leg: No edema  Skin:     General: Skin is warm and dry  Findings: No rash  Neurological:      General: No focal deficit present  Mental Status: She is alert  Mental status is at baseline     Psychiatric:         Mood and Affect: Mood normal          Behavior: Behavior normal            Sweta Wyman MD, PGY2  [address] [facility]  Date: 4/15/2022 Time: 10:42 AM

## 2022-04-15 NOTE — ASSESSMENT & PLAN NOTE
Acutely worsening anxiety and depression associated with recent social factors (separation from children's father who is an addict and recently incarcerated)   CONCHA-7 score 16 severe anxiety  PHQ9 score 12 moderate depression  Is affecting her ability to function at work and home   Denies thoughts of self harm and suicidal ideation   - pt previously on lexapro which helped but pt never increased dosage from 5mg to 10mg and in fact stopped taking a few months ago  Will restart lexapro at 10 mg  Heavily encouraged to take and educated on timeline that it may take up to 4-6 weeks to work  - Extensive education provided that it is a maintenance medication and very important she continues to take it even if she does not feel the effect immediately  Pt is anxious and does not like taking longterm medications but it is needed at this time  - reports atarax not helping and she does not like the way it makes her feel -> will switch to buspar 5 mg BID PRN, pt can uptitrate as needed to TID   - interested in establishing with counselor  At this time will refer to behavioral health first, pt interested in speaking with Dr Tasha Monroe, and appreciate further recommendations for referral out if needed   - note provided for work   Pt wants time off work but states that they do not take FMLA(?)   - emotional support provided   - follow up in 1 month to assess medication tolerance and improvement

## 2022-04-15 NOTE — LETTER
April 15, 2022     Patient: Kin Brito  YOB: 1985  Date of Visit: 4/15/2022      To Whom it May Concern:    Kaye Jocy Scottry is under my professional care  Kaye was seen in my office on 4/15/2022  She is being seen for mental health issues  Please excuse her from work until she is seen again and re-evaluated  If you have any questions or concerns, please don't hesitate to call           Sincerely,          Carol Ann Moreno MD        CC: No Recipients

## 2022-04-15 NOTE — PATIENT INSTRUCTIONS
Anxiety   WHAT YOU NEED TO KNOW:   Anxiety is a condition that causes you to feel extremely worried or nervous  The feelings are so strong that they can cause problems with your daily activities or sleep  Anxiety may be triggered by something you fear, or it may happen without a cause  Family or work stress, smoking, caffeine, and alcohol can increase your risk for anxiety  Certain medicines or health conditions can also increase your risk  Anxiety can become a long-term condition if it is not managed or treated  DISCHARGE INSTRUCTIONS:   Call your local emergency number (911 in the [address]) if:   · You have chest pain, tightness, or heaviness that may spread to your shoulders, arms, jaw, neck, or back  · You feel like hurting yourself or someone else  Call your doctor if:   · Your symptoms get worse or do not get better with treatment  · Your anxiety keeps you from doing your regular daily activities  · You have new symptoms since your last visit  · You have questions or concerns about your condition or care  Medicines:   · Medicines  may be given to help you feel more calm and relaxed, and decrease your symptoms  · Take your medicine as directed  Contact your healthcare provider if you think your medicine is not helping or if you have side effects  Tell him of her if you are allergic to any medicine  Keep a list of the medicines, vitamins, and herbs you take  Include the amounts, and when and why you take them  Bring the list or the pill bottles to follow-up visits  Carry your medicine list with you in case of an emergency  Manage anxiety:   · Talk to someone about your anxiety  Your healthcare provider may suggest counseling  Cognitive behavioral therapy can help you understand and change how you react to events that trigger your symptoms  You might feel more comfortable talking with a friend or family member about your anxiety   Choose someone you know will be supportive and encouraging  · Find ways to relax  Activities such as exercise, meditation, or listening to music can help you relax  Spend time with friends, or do things you enjoy  · Practice deep breathing  Deep breathing can help you relax when you feel anxious  Focus on taking slow, deep breaths several times a day, or during an anxiety attack  Breathe in through your nose and out through your mouth  · Create a regular sleep routine  Regular sleep can help you feel calmer during the day  Go to sleep and wake up at the same times every day  Do not watch television or use the computer right before bed  Your room should be comfortable, dark, and quiet  · Eat a variety of healthy foods  Healthy foods include fruits, vegetables, low-fat dairy products, lean meats, fish, whole-grain breads, and cooked beans  Healthy foods can help you feel less anxious and have more energy  · Exercise regularly  Exercise can increase your energy level  Exercise may also lift your mood and help you sleep better  Your healthcare provider can help you create an exercise plan  · Do not smoke  Nicotine and other chemicals in cigarettes and cigars can increase anxiety  Ask your healthcare provider for information if you currently smoke and need help to quit  E-cigarettes or smokeless tobacco still contain nicotine  Talk to your healthcare provider before you use these products  · Do not have caffeine  Caffeine can make your symptoms worse  Do not have foods or drinks that are meant to increase your energy level  · Limit or do not drink alcohol  Ask your healthcare provider if alcohol is safe for you  You may not be able to drink alcohol if you take certain anxiety or depression medicines  Limit alcohol to 1 drink per day if you are a woman  Limit alcohol to 2 drinks per day if you are a man  A drink of alcohol is 12 ounces of beer, 5 ounces of wine, or 1½ ounces of liquor  · Do not use drugs    Drugs can make your anxiety worse  It can also make anxiety hard to manage  Talk to your healthcare provider if you use drugs and want help to quit  Follow up with your doctor within 2 weeks or as directed:  Write down your questions so you remember to ask them during your visits  © Copyright [employer] 2022 Information is for End User's use only and may not be sold, redistributed or otherwise used for commercial purposes  All illustrations and images included in CareNotes® are the copyrighted property of [employer]  or Herb Mc[employer]   The above information is an  only  It is not intended as medical advice for individual conditions or treatments  Talk to your doctor, nurse or pharmacist before following any medical regimen to see if it is safe and effective for you

## 2022-05-18 ENCOUNTER — TELEMEDICINE (OUTPATIENT)
Dept: FAMILY MEDICINE CLINIC | Facility: CLINIC | Age: 37
End: 2022-05-18

## 2022-05-18 DIAGNOSIS — U07.1 COVID-19: Primary | ICD-10-CM

## 2022-05-18 PROCEDURE — G2012 BRIEF CHECK IN BY MD/QHP: HCPCS | Performed by: STUDENT IN AN ORGANIZED HEALTH CARE EDUCATION/TRAINING PROGRAM

## 2022-05-18 NOTE — ASSESSMENT & PLAN NOTE
Patient's daughter complained of headache and fatigue and was sent home from school on 05/16/2022  Patient tested for COVID and had a positive home test  Currently with mild symptoms including headache  No shortness of breath or chest pain  Encourage hydration and rest   Tylenol for fevers as needed  Discussed warning signs for worsening infection  Follow-up as needed

## 2022-05-18 NOTE — PROGRESS NOTES
Virtual Brief Visit    Patient is located in the following state in which I hold an active license [place]      Assessment/Plan:    Problem List Items Addressed This Visit        Other    COVID-19 - Primary     Patient's daughter complained of headache and fatigue and was sent home from school on 05/16/2022  Patient had Positive home COVID test 05/16/2022  Currently with mild symptoms including headache  No shortness of breath or chest pain  Encourage hydration and rest   Tylenol for fevers as needed  Discussed warning signs for worsening infection  Follow-up as needed  Recent Visits  No visits were found meeting these conditions  Showing recent visits within past 7 days and meeting all other requirements  Today's Visits  Date Type Provider Dept   05/18/22 Telemedicine Ian December, [facility] today's visits and meeting all other requirements  Future Appointments  No visits were found meeting these conditions    Showing future appointments within next 150 days and meeting all other requirements         I spent 25 minutes directly with the patient during this visit

## 2022-05-20 NOTE — PROGRESS NOTES
Virtual Regular Visit    Verification of patient location:    Patient is located in the following state in which I hold an active license [place]      Assessment/Plan:    Problem List Items Addressed This Visit        Other    COVID-19 - Primary     Patient's daughter complained of headache and fatigue and was sent home from school on 05/16/2022  Patient tested for COVID and had a positive home test  Currently with mild symptoms including headache  No shortness of breath or chest pain  Encourage hydration and rest   Tylenol for fevers as needed  Discussed warning signs for worsening infection  Follow-up as needed  Reason for visit is   Chief Complaint   Patient presents with    Virtual Brief Visit        Encounter provider Maximino Kaminski DO    Provider located at [address] [place] [id number] [phone]      Recent Visits  Date Type Provider Dept   05/18/22 [address]  [facility] , [address] [place] [address] recent visits within past 7 days and meeting all other requirements  Future Appointments  No visits were found meeting these conditions  Showing future appointments within next 150 days and meeting all other requirements       The patient was identified by name and date of birth  Juan Daniel Verdin was informed that this is a telemedicine visit and that the visit is being conducted through [facility] and patient was informed that this is not a secure, HIPAA-compliant platform  She agrees to proceed     My office door was closed  No one else was in the room  She acknowledged consent and understanding of privacy and security of the video platform  The patient has agreed to participate and understands they can discontinue the visit at any time  Patient is aware this is a billable service  Subjective  Kaye Bravo is a 29 y o  female          HPI   Patient here for evaluation of headache and congestion  She says her daughter was recently exposed to [place] several days ago  She had positive home COVID test   She denies any fevers, cough or shortness of breath      Past Medical History:   Diagnosis Date    Anesthesia complication     oxygen levels drop - always needs oxygen    Anxiety     Asthma     not on medications    Chronic hypertension in obstetric context in second trimester 10/16/2018    Depression     Gestational diabetes     Hypertension     Iron deficiency anemia secondary to inadequate dietary iron intake 3/14/2019    Kidney stone     Motion sickness     PONV (postoperative nausea and vomiting)     "always needs oxygen after  "    Scaly patch rash     UTI (urinary tract infection)     Varicella     had as a child    Wears glasses        Past Surgical History:   Procedure Laterality Date    CYSTOSCOPY      OK CYSTO/URETERO W/LITHOTRIPSY &INDWELL STENT INSRT Right 6/12/2018    Procedure: CYSTOSCOPY URETEROSCOPY WITH LITHOTRIPSY HOLMIUM LASER, RETROGRADE PYELOGRAM AND INSERTION STENT URETERAL;  Surgeon: Su Stock MD;  [facility]: [facility];  Service: Urology    OK CYSTO/URETERO W/LITHOTRIPSY &INDWELL STENT INSRT Right 7/17/2018    Procedure: CYSTO, URETEROSCOPY, RETROGRADE PYELOGRAM, STENT REMOVAL,STENT INSERTION,STONE EXTRACTION WITH BASKET;  Surgeon: Angelique Colvin MD;  Location: [facility];  Service: Urology    RENAL ARTERY STENT  2015       Current Outpatient Medications   Medication Sig Dispense Refill    albuterol (2 5 mg/3 mL) 0 083 % nebulizer solution Take 1 vial (2 5 mg total) by nebulization every 6 (six) hours as needed for wheezing or shortness of breath 30 vial 1    albuterol (Ventolin HFA) 90 mcg/act inhaler Inhale 2 puffs every 6 (six) hours as needed for wheezing 1 Inhaler 2    busPIRone (BUSPAR) 5 mg tablet Take 1 tablet (5 mg total) by mouth 2 (two) times a day 30 tablet 3    escitalopram (Lexapro) 10 mg tablet Take 1 tablet (10 mg total) by mouth daily 30 tablet 2    lidocaine (LMX) 4 % cream Apply topically as needed for mild pain 30 g 0    methocarbamol (ROBAXIN) 500 mg tablet Take 1 tablet (500 mg total) by mouth 3 (three) times a day as needed for muscle spasms 30 tablet 0     No current facility-administered medications for this visit  Allergies   Allergen Reactions    Dilaudid [Hydromorphone Hcl] Vomiting    Flomax [Tamsulosin] Hives    Macrobid [Nitrofurantoin] Hives    Penicillins Hives and Other (See Comments)    Tramadol GI Intolerance     nausea       Review of Systems   Constitutional: Negative for fatigue and fever  HENT: Positive for congestion  Respiratory: Negative for cough, shortness of breath and wheezing  Cardiovascular: Negative for chest pain  Gastrointestinal: Negative for abdominal distention  Neurological: Positive for headaches  Video Exam    There were no vitals filed for this visit  Physical Exam  Constitutional:       General: She is not in acute distress  Appearance: She is not toxic-appearing  Neurological:      Mental Status: She is alert  I spent 25 minutes directly with the patient during this visit    [facility] verbally agrees to participate in [employer]  Pt is aware that [employer] could be limited without vital signs or the ability to perform a full hands-on physical Backus Bayron understands she or the provider may request at any time to terminate the video visit and request the patient to seek care or treatment in person

## 2022-05-23 ENCOUNTER — TELEPHONE (OUTPATIENT)
Dept: FAMILY MEDICINE CLINIC | Facility: CLINIC | Age: 37
End: 2022-05-23

## 2022-09-02 ENCOUNTER — VBI (OUTPATIENT)
Dept: ADMINISTRATIVE | Facility: OTHER | Age: 37
End: 2022-09-02
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
## 2022-10-25 ENCOUNTER — TELEPHONE (OUTPATIENT)
Dept: FAMILY MEDICINE CLINIC | Facility: CLINIC | Age: 37
End: 2022-10-25

## 2022-11-04 ENCOUNTER — OFFICE VISIT (OUTPATIENT)
Dept: FAMILY MEDICINE CLINIC | Facility: CLINIC | Age: 37
End: 2022-11-04

## 2022-11-04 VITALS
RESPIRATION RATE: 18 BRPM | HEIGHT: 64 IN | BODY MASS INDEX: 41.52 KG/M2 | WEIGHT: 243.2 LBS | SYSTOLIC BLOOD PRESSURE: 123 MMHG | DIASTOLIC BLOOD PRESSURE: 84 MMHG | OXYGEN SATURATION: 99 % | TEMPERATURE: 97.3 F | HEART RATE: 77 BPM

## 2022-11-04 DIAGNOSIS — M25.561 RIGHT ANTERIOR KNEE PAIN: Primary | ICD-10-CM

## 2022-11-04 RX ORDER — NAPROXEN 500 MG/1
500 TABLET ORAL 2 TIMES DAILY WITH MEALS
Qty: 30 TABLET | Refills: 0 | Status: SHIPPED | OUTPATIENT
Start: 2022-11-04 | End: 2022-11-19

## 2022-11-04 RX ORDER — HYDROXYZINE HYDROCHLORIDE 25 MG/1
25 TABLET, FILM COATED ORAL AS NEEDED
COMMUNITY

## 2022-11-04 NOTE — ASSESSMENT & PLAN NOTE
Acute right anterior knee pain x 5 days since fall on slippery surface while at work  Bruising and differential warmth noted on right anterior knee  Neg valgus and varus test but significant tenderness wt knee flexion, pulses, strength and sensation intact  - Continue rest, ICE and will benefit from knee brace  - short course naproxen prescribed for pain  - Right knee XR and US ordered to r/o fracture  - Referral to sports medicine

## 2022-11-04 NOTE — PATIENT INSTRUCTIONS
Knee Pain   WHAT YOU NEED TO KNOW:   Knee pain may start suddenly, or it may be a long-term problem  You may have pain on the side, front, or back of your knee  You may have knee stiffness and swelling  You may hear popping sounds or feel like your knee is giving way or locking up as you walk  You may feel pain when you sit, stand, walk, or climb up and down stairs  Knee pain can be caused by conditions such as obesity, inflammation, or strains or tears in ligaments or tendons  DISCHARGE INSTRUCTIONS:   Return to the emergency department if:   Your pain is worse, even after treatment  You cannot bend or straighten your leg completely  The swelling around your knee does not go down even with treatment  Your knee is painful and hot to the touch  Contact your healthcare provider if:   You have questions or concerns about your condition or care  Medicines: You may need any of the following:  NSAIDs  help decrease swelling and pain or fever  This medicine is available with or without a doctor's order  NSAIDs can cause stomach bleeding or kidney problems in certain people  If you take blood thinner medicine, always ask your healthcare provider if NSAIDs are safe for you  Always read the medicine label and follow directions  Acetaminophen  decreases pain and fever  It is available without a doctor's order  Ask how much to take and how often to take it  Follow directions  Read the labels of all other medicines you are using to see if they also contain acetaminophen, or ask your doctor or pharmacist  Acetaminophen can cause liver damage if not taken correctly  Do not use more than 4 grams (4,000 milligrams) total of acetaminophen in one day  Prescription pain medicine  may be given  Ask your healthcare provider how to take this medicine safely  Some prescription pain medicines contain acetaminophen   Do not take other medicines that contain acetaminophen without talking to your healthcare provider  Too much acetaminophen may cause liver damage  Prescription pain medicine may cause constipation  Ask your healthcare provider how to prevent or treat constipation  Take your medicine as directed  Contact your healthcare provider if you think your medicine is not helping or if you have side effects  Tell him or her if you are allergic to any medicine  Keep a list of the medicines, vitamins, and herbs you take  Include the amounts, and when and why you take them  Bring the list or the pill bottles to follow-up visits  Carry your medicine list with you in case of an emergency  What you can do to manage your symptoms:   Rest your knee so it can heal   Limit activities that increase your pain  Do low-impact exercises, such as walking or swimming  Apply ice to help reduce swelling and pain  Use an ice pack, or put crushed ice in a plastic bag  Cover it with a towel before you apply it to your knee  Apply ice for 15 to 20 minutes every hour, or as directed  Apply compression to help reduce swelling  Use a brace or bandage only as directed  Elevate your knee to help decrease pain and swelling  Elevate your knee while you are sitting or lying down  Prop your leg on pillows to keep your knee above the level of your heart  Prevent your knee from moving as directed  Your healthcare provider may put on a cast or splint  You may need to wear a leg brace to stabilize your knee  A leg brace can be adjusted to increase your range of motion as your knee heals  What you can do to prevent knee pain:   Maintain a healthy weight  Extra weight increases your risk for knee pain  Ask your healthcare provider how much you should weigh  He or she can help you create a safe weight loss plan if you need to lose weight  Exercise or train properly  Use the correct equipment for sports  Wear shoes that provide good support  Check your posture often as you exercise, play sports, or train for an event  This can help prevent stress and strain on your knees  Rest between sessions so you do not overwork your knees  Follow up with your healthcare provider within 24 hours or as directed: You may need follow-up treatments, such as steroid injections to decrease pain  Write down your questions so you remember to ask them during your visits  © Copyright [employer] 2022 Information is for End User's use only and may not be sold, redistributed or otherwise used for commercial purposes  All illustrations and images included in CareNotes® are the copyrighted property of [employer]  or [facility] Betzaida Mcgrath   The above information is an  only  It is not intended as medical advice for individual conditions or treatments  Talk to your doctor, nurse or pharmacist before following any medical regimen to see if it is safe and effective for you

## 2022-11-04 NOTE — PROGRESS NOTES
Name: Huang Paul      : 1985      MRN: [id number]  Encounter Provider: Basia Ramirez MD  Encounter Date: 2022   Encounter department: [address] [place] [address]  Right anterior knee pain  Assessment & Plan:  Acute right anterior knee pain x 5 days since fall on slippery surface while at work  Bruising and differential warmth noted on right anterior knee  Neg valgus and varus test but significant tenderness wt knee flexion, pulses, strength and sensation intact  - Continue rest, ICE and will benefit from knee brace  - short course naproxen prescribed for pain  - Right knee XR and US ordered to r/o fracture  - Referral to sports medicine  Orders:  -     XR knee 4+ vw right injury; Future; Expected date: 2022  -     XR tibia fibula 2 vw right; Future; Expected date: 2022  -     naproxen (Naprosyn) 500 mg tablet; Take 1 tablet (500 mg total) by mouth 2 (two) times a day with meals for 15 days  -     [facility] limited; Future; Expected date: 2022  -     Ambulatory Referral to Sports Medicine; Future       Subjective      Kaye is a 29year old female p/w right anterior knee pain that started after she fell on wet floor at work on   She is a  at [employer] and slipped on the wet floor, landing on her right knee  She grabbed the cart while falling and also hit her left arm against the cabinet  She feels the most pain on the anterior aspect of her right knee where she also has some bruising  Pain is worse with ambulation and knee flexion  She denies locking and clicking but feels like her knee "wants to give out"  She has mild pain on her left arm  After the injury, the right knee was initially swollen but has significantly improved  She has been out of work all week due to injury  Has been taking motrin for pain relief which has not been effective  No other acute concerns   No fever or rash or recent illness  Review of Systems   Constitutional: Negative for activity change, appetite change, fatigue and fever  HENT: Negative for sore throat  Respiratory: Negative for cough, shortness of breath and wheezing  Cardiovascular: Negative for chest pain, palpitations and leg swelling  Gastrointestinal: Negative for abdominal pain, diarrhea, nausea and vomiting  Genitourinary: Negative for dysuria and pelvic pain  Musculoskeletal: Positive for arthralgias and joint swelling  Negative for gait problem, myalgias, neck pain and neck stiffness  Skin: Negative for rash  Neurological: Negative for weakness and headaches  Current Outpatient Medications on File Prior to Visit   Medication Sig   • albuterol (2 5 mg/3 mL) 0 083 % nebulizer solution Take 1 vial (2 5 mg total) by nebulization every 6 (six) hours as needed for wheezing or shortness of breath   • albuterol (Ventolin HFA) 90 mcg/act inhaler Inhale 2 puffs every 6 (six) hours as needed for wheezing   • hydrOXYzine HCL (ATARAX) 25 mg tablet Take 25 mg by mouth if needed for itching   • busPIRone (BUSPAR) 5 mg tablet Take 1 tablet (5 mg total) by mouth 2 (two) times a day (Patient not taking: Reported on 11/4/2022)   • escitalopram (Lexapro) 10 mg tablet Take 1 tablet (10 mg total) by mouth daily (Patient not taking: Reported on 11/4/2022)   • lidocaine (LMX) 4 % cream Apply topically as needed for mild pain (Patient not taking: Reported on 11/4/2022)   • methocarbamol (ROBAXIN) 500 mg tablet Take 1 tablet (500 mg total) by mouth 3 (three) times a day as needed for muscle spasms (Patient not taking: Reported on 11/4/2022)       Objective     /84 (BP Location: Right arm, Patient Position: Sitting, Cuff Size: Large)   Pulse 77   Temp (!) 97 3 °F (36 3 °C) (Temporal)   Resp 18   Ht 5' 4" (1 626 m)   Wt 110 kg (243 lb 3 2 oz)   SpO2 99%   BMI 41 75 kg/m²     Physical Exam  Constitutional:       General: She is not in acute distress  Appearance: She is obese  She is not ill-appearing  HENT:      Head: Normocephalic and atraumatic  Right Ear: External ear normal       Left Ear: External ear normal       Nose: Nose normal    Eyes:      Conjunctiva/sclera: Conjunctivae normal    Cardiovascular:      Rate and Rhythm: Normal rate  Pulmonary:      Effort: Pulmonary effort is normal  No respiratory distress  Breath sounds: No wheezing  Abdominal:      Palpations: Abdomen is soft  Tenderness: There is no abdominal tenderness  Musculoskeletal:         General: No swelling or deformity  Right knee: Ecchymosis present  No swelling, deformity, erythema or crepitus  Decreased range of motion  Tenderness (anteriorly) present  Normal pulse  Left knee: Normal       Right lower leg: No edema  Left lower leg: No edema  Skin:     General: Skin is warm  Findings: No rash  Neurological:      General: No focal deficit present  Mental Status: She is alert and oriented to person, place, and time     Psychiatric:         Behavior: Behavior normal        Juliet Rodriguez MD

## 2023-01-18 ENCOUNTER — VBI (OUTPATIENT)
Dept: ADMINISTRATIVE | Facility: OTHER | Age: 38
End: 2023-01-18

## 2023-01-27 ENCOUNTER — TELEPHONE (OUTPATIENT)
Dept: PSYCHIATRY | Facility: CLINIC | Age: 38
End: 2023-01-27

## 2023-01-27 NOTE — TELEPHONE ENCOUNTER
Contacted patient in regards to routine referral and placing patient on proper wait list, lvm for patient to contact intake department

## 2023-02-01 NOTE — TELEPHONE ENCOUNTER
Contacted patient in regards to routine referral and placing patient on proper wait list  lvm for patient to contact intake department

## 2023-03-22 NOTE — LETTER
January 13, 2022     Patient: Haleigh Mclean   YOB: 1985   Date of Visit: 1/13/2022       To Whom it May Concern:    Kaye Rosales is under my professional care  She was seen in my office on 1/13/2022  She was out of work 1/12/2022  She may return to work on 01/17/2022  If you have any questions or concerns, please don't hesitate to call           Sincerely,          Amanuel Samuels, DO Topical Clindamycin Counseling: Patient counseled that this medication may cause skin irritation or allergic reactions.  In the event of skin irritation, the patient was advised to reduce the amount of the drug applied or use it less frequently.   The patient verbalized understanding of the proper use and possible adverse effects of clindamycin.  All of the patient's questions and concerns were addressed.

## 2023-08-24 ENCOUNTER — APPOINTMENT (EMERGENCY)
Dept: RADIOLOGY | Facility: HOSPITAL | Age: 38
End: 2023-08-24
Payer: COMMERCIAL

## 2023-08-24 ENCOUNTER — HOSPITAL ENCOUNTER (EMERGENCY)
Facility: HOSPITAL | Age: 38
Discharge: HOME | End: 2023-08-24
Attending: EMERGENCY MEDICINE
Payer: COMMERCIAL

## 2023-08-24 VITALS
HEART RATE: 76 BPM | RESPIRATION RATE: 19 BRPM | SYSTOLIC BLOOD PRESSURE: 163 MMHG | TEMPERATURE: 97.5 F | DIASTOLIC BLOOD PRESSURE: 93 MMHG | OXYGEN SATURATION: 98 %

## 2023-08-24 DIAGNOSIS — R11.0 NAUSEA: ICD-10-CM

## 2023-08-24 DIAGNOSIS — U07.1 COVID: Primary | ICD-10-CM

## 2023-08-24 LAB
ANION GAP SERPL CALCULATED.3IONS-SCNC: 5 MMOL/L
BASOPHILS # BLD AUTO: 0.02 THOUSANDS/ÂΜL (ref 0–0.1)
BASOPHILS NFR BLD AUTO: 1 % (ref 0–1)
BILIRUB UR QL STRIP: NEGATIVE
BUN SERPL-MCNC: 8 MG/DL (ref 5–25)
CALCIUM SERPL-MCNC: 8.8 MG/DL (ref 8.4–10.2)
CHLORIDE SERPL-SCNC: 109 MMOL/L (ref 96–108)
CLARITY UR: CLEAR
CO2 SERPL-SCNC: 25 MMOL/L (ref 21–32)
COLOR UR: ABNORMAL
CREAT SERPL-MCNC: 0.6 MG/DL (ref 0.6–1.3)
EOSINOPHIL # BLD AUTO: 0.03 THOUSAND/ÂΜL (ref 0–0.61)
EOSINOPHIL NFR BLD AUTO: 1 % (ref 0–6)
ERYTHROCYTE [DISTWIDTH] IN BLOOD BY AUTOMATED COUNT: 14.1 % (ref 11.6–15.1)
EST. AVERAGE GLUCOSE BLD GHB EST-MCNC: 105 MG/DL
EXT PREGNANCY TEST URINE: NEGATIVE
EXT. CONTROL: NORMAL
FLUAV RNA RESP QL NAA+PROBE: NEGATIVE
FLUBV RNA RESP QL NAA+PROBE: NEGATIVE
GFR SERPL CREATININE-BSD FRML MDRD: 117 ML/MIN/1.73SQ M
GLUCOSE SERPL-MCNC: 165 MG/DL (ref 65–140)
GLUCOSE UR STRIP-MCNC: ABNORMAL MG/DL
HBA1C MFR BLD: 5.3 %
HCT VFR BLD AUTO: 40.2 % (ref 34.8–46.1)
HGB BLD-MCNC: 13 G/DL (ref 11.5–15.4)
HGB UR QL STRIP.AUTO: NEGATIVE
IMM GRANULOCYTES # BLD AUTO: 0.02 THOUSAND/UL (ref 0–0.2)
IMM GRANULOCYTES NFR BLD AUTO: 1 % (ref 0–2)
KETONES UR STRIP-MCNC: NEGATIVE MG/DL
LEUKOCYTE ESTERASE UR QL STRIP: NEGATIVE
LYMPHOCYTES # BLD AUTO: 0.76 THOUSANDS/ÂΜL (ref 0.6–4.47)
LYMPHOCYTES NFR BLD AUTO: 24 % (ref 14–44)
MCH RBC QN AUTO: 28.7 PG (ref 26.8–34.3)
MCHC RBC AUTO-ENTMCNC: 32.3 G/DL (ref 31.4–37.4)
MCV RBC AUTO: 89 FL (ref 82–98)
MONOCYTES # BLD AUTO: 0.45 THOUSAND/ÂΜL (ref 0.17–1.22)
MONOCYTES NFR BLD AUTO: 14 % (ref 4–12)
NEUTROPHILS # BLD AUTO: 1.86 THOUSANDS/ÂΜL (ref 1.85–7.62)
NEUTS SEG NFR BLD AUTO: 59 % (ref 43–75)
NITRITE UR QL STRIP: NEGATIVE
NRBC BLD AUTO-RTO: 0 /100 WBCS
PH UR STRIP.AUTO: 7 [PH]
PLATELET # BLD AUTO: 197 THOUSANDS/UL (ref 149–390)
PMV BLD AUTO: 10.3 FL (ref 8.9–12.7)
POTASSIUM SERPL-SCNC: 3.5 MMOL/L (ref 3.5–5.3)
PROT UR STRIP-MCNC: NEGATIVE MG/DL
RBC # BLD AUTO: 4.53 MILLION/UL (ref 3.81–5.12)
RSV RNA RESP QL NAA+PROBE: NEGATIVE
SARS-COV-2 RNA RESP QL NAA+PROBE: POSITIVE
SODIUM SERPL-SCNC: 139 MMOL/L (ref 135–147)
SP GR UR STRIP.AUTO: 1.01 (ref 1–1.03)
UROBILINOGEN UR STRIP-ACNC: <2 MG/DL
WBC # BLD AUTO: 3.14 THOUSAND/UL (ref 4.31–10.16)

## 2023-08-24 PROCEDURE — 71046 X-RAY EXAM CHEST 2 VIEWS: CPT

## 2023-08-24 PROCEDURE — 82948 REAGENT STRIP/BLOOD GLUCOSE: CPT

## 2023-08-24 PROCEDURE — 36415 COLL VENOUS BLD VENIPUNCTURE: CPT | Performed by: PHYSICIAN ASSISTANT

## 2023-08-24 PROCEDURE — 83036 HEMOGLOBIN GLYCOSYLATED A1C: CPT | Performed by: PHYSICIAN ASSISTANT

## 2023-08-24 PROCEDURE — 99284 EMERGENCY DEPT VISIT MOD MDM: CPT

## 2023-08-24 PROCEDURE — 80048 BASIC METABOLIC PNL TOTAL CA: CPT | Performed by: PHYSICIAN ASSISTANT

## 2023-08-24 PROCEDURE — 99285 EMERGENCY DEPT VISIT HI MDM: CPT | Performed by: PHYSICIAN ASSISTANT

## 2023-08-24 PROCEDURE — 0241U HB NFCT DS VIR RESP RNA 4 TRGT: CPT | Performed by: PHYSICIAN ASSISTANT

## 2023-08-24 PROCEDURE — 81025 URINE PREGNANCY TEST: CPT | Performed by: PHYSICIAN ASSISTANT

## 2023-08-24 PROCEDURE — 85025 COMPLETE CBC W/AUTO DIFF WBC: CPT | Performed by: PHYSICIAN ASSISTANT

## 2023-08-24 RX ORDER — ONDANSETRON 4 MG/1
4 TABLET, FILM COATED ORAL EVERY 6 HOURS
Qty: 12 TABLET | Refills: 0 | Status: SHIPPED | OUTPATIENT
Start: 2023-08-24

## 2023-08-24 NOTE — ED PROVIDER NOTES
History  Chief Complaint   Patient presents with   • Medical Problem     Pt presents with dizziness, body aches and SOB x3 days. Pt states she has not had any fevers at home. 44-year-old female presents the emergency department with complaints of viral symptoms. States that she has had some dizziness with generalized body aches and a slight cough over the past 3 days. States that she feels short of breath at times. Additionally reports nasal congestion and a mild sore throat. No known fevers at home. Has had several sick contacts. None confirmed with COVID or the flu. Additionally states that she is borderline diabetic and feels that part of her symptoms may be due to her blood sugar being labile. History provided by:  Patient   used: No    Medical Problem  Associated symptoms: congestion, cough, headaches and nausea    Associated symptoms: no abdominal pain, no chest pain, no diarrhea, no ear pain, no fever, no rhinorrhea, no shortness of breath, no sore throat, no vomiting and no wheezing        Prior to Admission Medications   Prescriptions Last Dose Informant Patient Reported? Taking?    albuterol (2.5 mg/3 mL) 0.083 % nebulizer solution  Self No No   Sig: Take 1 vial (2.5 mg total) by nebulization every 6 (six) hours as needed for wheezing or shortness of breath   albuterol (Ventolin HFA) 90 mcg/act inhaler  Self No No   Sig: Inhale 2 puffs every 6 (six) hours as needed for wheezing   busPIRone (BUSPAR) 5 mg tablet  Self No No   Sig: Take 1 tablet (5 mg total) by mouth 2 (two) times a day   Patient not taking: Reported on 11/4/2022   escitalopram (Lexapro) 10 mg tablet  Self No No   Sig: Take 1 tablet (10 mg total) by mouth daily   Patient not taking: Reported on 11/4/2022   hydrOXYzine HCL (ATARAX) 25 mg tablet  Self Yes No   Sig: Take 25 mg by mouth if needed for itching   lidocaine (LMX) 4 % cream  Self No No   Sig: Apply topically as needed for mild pain   Patient not taking: Reported on 11/4/2022   methocarbamol (ROBAXIN) 500 mg tablet  Self No No   Sig: Take 1 tablet (500 mg total) by mouth 3 (three) times a day as needed for muscle spasms   Patient not taking: Reported on 11/4/2022   naproxen (Naprosyn) 500 mg tablet   No No   Sig: Take 1 tablet (500 mg total) by mouth 2 (two) times a day with meals for 15 days      Facility-Administered Medications: None       Past Medical History:   Diagnosis Date   • Anesthesia complication     oxygen levels drop - always needs oxygen   • Anxiety    • Asthma     not on medications   • Chronic hypertension in obstetric context in second trimester 10/16/2018   • Depression    • Gestational diabetes    • Hypertension    • Iron deficiency anemia secondary to inadequate dietary iron intake 3/14/2019   • Kidney stone    • Motion sickness    • PONV (postoperative nausea and vomiting)     "always needs oxygen after  "   • Scaly patch rash    • UTI (urinary tract infection)    • Varicella     had as a child   • Wears glasses        Past Surgical History:   Procedure Laterality Date   • CYSTOSCOPY     • OH CYSTO/URETERO W/LITHOTRIPSY &INDWELL STENT INSRT Right 6/12/2018    Procedure: CYSTOSCOPY URETEROSCOPY WITH LITHOTRIPSY HOLMIUM LASER, RETROGRADE PYELOGRAM AND INSERTION STENT URETERAL;  Surgeon: Vik Garcia MD;  Location: [facility];  Service: Urology   • OH CYSTO/URETERO W/LITHOTRIPSY &INDWELL STENT INSRT Right 7/17/2018    Procedure: CYSTO, URETEROSCOPY, RETROGRADE PYELOGRAM, STENT REMOVAL,STENT INSERTION,STONE EXTRACTION WITH BASKET;  Surgeon: Olaf Mcwilliams MD;  Location: [facility];  Service: Urology   • RENAL ARTERY STENT  2015       Family History   Problem Relation Age of Onset   • Diabetes Mother    • Hypertension Mother    • Muscular dystrophy Family      I have reviewed and agree with the history as documented.     E-Cigarette/Vaping   • E-Cigarette Use Never User      E-Cigarette/Vaping Substances   • Nicotine No    • THC No • CBD No    • Flavoring No    • Other No    • Unknown No      Social History     Tobacco Use   • Smoking status: Some Days     Packs/day: 0.20     Types: Cigarettes   • Smokeless tobacco: Never   Vaping Use   • Vaping Use: Never used   Substance Use Topics   • Alcohol use: Yes     Comment: socially   • Drug use: No       Review of Systems   Constitutional: Negative for activity change, appetite change, chills and fever. HENT: Positive for congestion. Negative for dental problem, drooling, ear discharge, ear pain, mouth sores, nosebleeds, rhinorrhea, sore throat and trouble swallowing. Eyes: Negative for pain, discharge and itching. Respiratory: Positive for cough. Negative for chest tightness, shortness of breath and wheezing. Cardiovascular: Negative for chest pain and palpitations. Gastrointestinal: Positive for nausea. Negative for abdominal pain, blood in stool, constipation, diarrhea and vomiting. Endocrine: Negative for cold intolerance and heat intolerance. Genitourinary: Negative for difficulty urinating, dysuria, flank pain, frequency and urgency. Allergic/Immunologic: Negative for food allergies and immunocompromised state. Neurological: Positive for headaches. Negative for dizziness, seizures, syncope, weakness and numbness. Psychiatric/Behavioral: Negative for agitation, behavioral problems and confusion. Physical Exam  Physical Exam  Vitals and nursing note reviewed. Constitutional:       General: She is not in acute distress. Appearance: She is not diaphoretic. HENT:      Head: Normocephalic and atraumatic. Right Ear: Tympanic membrane, ear canal and external ear normal.      Left Ear: Tympanic membrane, ear canal and external ear normal.      Mouth/Throat:      Pharynx: Posterior oropharyngeal erythema present. No oropharyngeal exudate. Eyes:      Conjunctiva/sclera: Conjunctivae normal.   Neck:      Vascular: No JVD. Trachea: No tracheal deviation. Cardiovascular:      Rate and Rhythm: Normal rate and regular rhythm. Heart sounds: Normal heart sounds. No murmur heard. No friction rub. No gallop. Pulmonary:      Effort: Pulmonary effort is normal. No respiratory distress. Breath sounds: Normal breath sounds. No wheezing or rales. Chest:      Chest wall: No tenderness. Musculoskeletal:         General: No tenderness or deformity. Normal range of motion. Lymphadenopathy:      Cervical: No cervical adenopathy. Skin:     General: Skin is warm and dry. Findings: No erythema or rash. Neurological:      Mental Status: She is alert and oriented to person, place, and time.    Psychiatric:         Mood and Affect: Mood normal.         Behavior: Behavior normal.         Vital Signs  ED Triage Vitals [08/24/23 1116]   Temperature Pulse Respirations Blood Pressure SpO2   97.5 °F (36.4 °C) 76 18 150/72 99 %      Temp Source Heart Rate Source Patient Position - Orthostatic VS BP Location FiO2 (%)   Oral Monitor Sitting Right arm --      Pain Score       7           Vitals:    08/24/23 1116 08/24/23 1200   BP: 150/72 163/93   Pulse: 76 76   Patient Position - Orthostatic VS: Sitting          Visual Acuity      ED Medications  Medications - No data to display    Diagnostic Studies  Results Reviewed     Procedure Component Value Units Date/Time    POCT pregnancy, urine [[id number]]  (Normal) Resulted: 08/24/23 1232    Lab Status: Final result Updated: 08/24/23 1234     EXT Preg Test, Ur Negative     Control Valid    UA w Reflex to Microscopic w Reflex to Culture [[id number]] Collected: 08/24/23 1232    Lab Status: No result Specimen: Urine, Clean Catch     FLU/RSV/COVID - if FLU/RSV clinically relevant [[id number]]  (Abnormal) Collected: 08/24/23 1145    Lab Status: Final result Specimen: Nares from Nose Updated: 08/24/23 1229     SARS-CoV-2 Positive     INFLUENZA A PCR Negative     INFLUENZA B PCR Negative     RSV PCR Negative    Narrative:      FOR PEDIATRIC PATIENTS - copy/paste COVID Guidelines URL to browser: [email or web address]    SARS-CoV-2 assay is a Nucleic Acid Amplification assay intended for the  qualitative detection of nucleic acid from SARS-CoV-2 in nasopharyngeal  swabs. Results are for the presumptive identification of SARS-CoV-2 RNA. Positive results are indicative of infection with SARS-CoV-2, the virus  causing COVID-19, but do not rule out bacterial infection or co-infection  with other viruses. Laboratories within the [facility] and its  territories are required to report all positive results to the appropriate  public health authorities. Negative results do not preclude SARS-CoV-2  infection and should not be used as the sole basis for treatment or other  patient management decisions. Negative results must be combined with  clinical observations, patient history, and epidemiological information. This test has not been FDA cleared or approved. This test has been authorized by FDA under an Emergency Use Authorization  (EUA). This test is only authorized for the duration of time the  declaration that circumstances exist justifying the authorization of the  emergency use of an in vitro diagnostic tests for detection of SARS-CoV-2  virus and/or diagnosis of COVID-19 infection under section 564(b)(1) of  the Act, 21 U. S.C. 028YAV-0(C)(2), unless the authorization is terminated  or revoked sooner. The test has been validated but independent review by FDA  and CLIA is pending. Test performed using [employer] GeneXpert: This RT-PCR assay targets N2,  a region unique to SARS-CoV-2. A conserved region in the E-gene was chosen  for pan-Sarbecovirus detection which includes SARS-CoV-2. According to CMS-2020-01-R, this platform meets the definition of high-throughput technology.     Basic metabolic panel [[id number]]  (Abnormal) Collected: 08/24/23 1145    Lab Status: Final result Specimen: Blood from Arm, Left Updated: 08/24/23 1212     Sodium 139 mmol/L      Potassium 3.5 mmol/L      Chloride 109 mmol/L      CO2 25 mmol/L      ANION GAP 5 mmol/L      BUN 8 mg/dL      Creatinine 0.60 mg/dL      Glucose 165 mg/dL      Calcium 8.8 mg/dL      eGFR 117 ml/min/1.73sq m     Narrative:      [facility] guidelines for Chronic Kidney Disease (CKD):   •  Stage 1 with normal or high GFR (GFR > 90 mL/min/1.73 square meters)  •  Stage 2 Mild CKD (GFR = 60-89 mL/min/1.73 square meters)  •  Stage 3A Moderate CKD (GFR = 45-59 mL/min/1.73 square meters)  •  Stage 3B Moderate CKD (GFR = 30-44 mL/min/1.73 square meters)  •  Stage 4 Severe CKD (GFR = 15-29 mL/min/1.73 square meters)  •  Stage 5 End Stage CKD (GFR <15 mL/min/1.73 square meters)  Note: GFR calculation is accurate only with a steady state creatinine    CBC and differential [[id number]]  (Abnormal) Collected: 08/24/23 1145    Lab Status: Final result Specimen: Blood from Arm, Left Updated: 08/24/23 1153     WBC 3.14 Thousand/uL      RBC 4.53 Million/uL      Hemoglobin 13.0 g/dL      Hematocrit 40.2 %      MCV 89 fL      MCH 28.7 pg      MCHC 32.3 g/dL      RDW 14.1 %      MPV 10.3 fL      Platelets 286 Thousands/uL      nRBC 0 /100 WBCs      Neutrophils Relative 59 %      Immat GRANS % 1 %      Lymphocytes Relative 24 %      Monocytes Relative 14 %      Eosinophils Relative 1 %      Basophils Relative 1 %      Neutrophils Absolute 1.86 Thousands/µL      Immature Grans Absolute 0.02 Thousand/uL      Lymphocytes Absolute 0.76 Thousands/µL      Monocytes Absolute 0.45 Thousand/µL      Eosinophils Absolute 0.03 Thousand/µL      Basophils Absolute 0.02 Thousands/µL                  XR chest 2 views   ED Interpretation by Augie Friedman PA-C (08/24 1233)   Clear lungs                 Procedures  Procedures         ED Course                               SBIRT 20yo+    Flowsheet Row Most Recent Value   Initial Alcohol Screen: US AUDIT-C     1. How often do you have a drink containing alcohol? 0 Filed at: 08/24/2023 1138   2. How many drinks containing alcohol do you have on a typical day you are drinking? 0 Filed at: 08/24/2023 1138   3a. Male UNDER 65: How often do you have five or more drinks on one occasion? 0 Filed at: 08/24/2023 1138   3b. FEMALE Any Age, or MALE 65+: How often do you have 4 or more drinks on one occassion? 0 Filed at: 08/24/2023 1138   Audit-C Score 0 Filed at: 08/24/2023 1138   TESSY: How many times in the past year have you. .. Used an illegal drug or used a prescription medication for non-medical reasons? Never Filed at: 08/24/2023 1138                    Medical Decision Making  Differential diagnosis includes but not limited to: COVID, influenza, pneumonia, upper respiratory infection, dehydration    COVID: acute illness or injury  Nausea: acute illness or injury  Amount and/or Complexity of Data Reviewed  Labs: ordered. Decision-making details documented in ED Course. Radiology: ordered and independent interpretation performed. Decision-making details documented in ED Course. Risk  Prescription drug management. Disposition  Final diagnoses:   COVID   Nausea     Time reflects when diagnosis was documented in both MDM as applicable and the Disposition within this note     Time User Action Codes Description Comment    8/24/2023 12:33 PM Pasha Lewis Add [U07.1] COVID     8/24/2023 12:34 PM Pasha Lewis Add [R11.0] Nausea       ED Disposition     ED Disposition   Discharge    Condition   Stable    Date/Time   Thu Aug 24, 2023 12:33 PM    Comment   Elia Closs discharge to home/self care.                Follow-up Information     Follow up With Specialties Details Why [address], MD Family Medicine   [address] [place] [address]  [phone]            Patient's Medications   Discharge Prescriptions    ONDANSETRON (ZOFRAN) 4 MG TABLET    Take 1 tablet (4 mg total) by mouth every 6 (six) hours       Start Date: 8/24/2023 End Date: --       Order Dose: 4 mg       Quantity: 12 tablet    Refills: 0       No discharge procedures on file.     PDMP Review       Value Time User    PDMP Reviewed  Yes 12/9/2019 11:37 AM Elli Tillman MD          ED Provider  Electronically Signed by           Kumar Bates PA-C  08/24/23 1630

## 2023-08-25 LAB — GLUCOSE SERPL-MCNC: 184 MG/DL (ref 65–140)

## 2023-10-10 ENCOUNTER — VBI (OUTPATIENT)
Dept: ADMINISTRATIVE | Facility: OTHER | Age: 38
End: 2023-10-10

## 2024-02-21 PROBLEM — J06.9 VIRAL UPPER RESPIRATORY TRACT INFECTION: Status: RESOLVED | Noted: 2020-08-21 | Resolved: 2024-02-21

## 2024-04-25 ENCOUNTER — APPOINTMENT (EMERGENCY)
Dept: ULTRASOUND IMAGING | Facility: HOSPITAL | Age: 39
End: 2024-04-25
Payer: COMMERCIAL

## 2024-04-25 ENCOUNTER — HOSPITAL ENCOUNTER (EMERGENCY)
Facility: HOSPITAL | Age: 39
Discharge: HOME | End: 2024-04-25
Attending: EMERGENCY MEDICINE
Payer: COMMERCIAL

## 2024-04-25 VITALS
DIASTOLIC BLOOD PRESSURE: 63 MMHG | SYSTOLIC BLOOD PRESSURE: 127 MMHG | HEART RATE: 72 BPM | RESPIRATION RATE: 20 BRPM | TEMPERATURE: 97.4 F | OXYGEN SATURATION: 98 %

## 2024-04-25 DIAGNOSIS — R42 LIGHTHEADEDNESS: ICD-10-CM

## 2024-04-25 DIAGNOSIS — Z34.91 FIRST TRIMESTER PREGNANCY: Primary | ICD-10-CM

## 2024-04-25 LAB
ALBUMIN SERPL BCP-MCNC: 3.9 G/DL (ref 3.5–5)
ALP SERPL-CCNC: 73 U/L (ref 34–104)
ALT SERPL W P-5'-P-CCNC: 16 U/L (ref 7–52)
ANION GAP SERPL CALCULATED.3IONS-SCNC: 7 MMOL/L (ref 4–13)
AST SERPL W P-5'-P-CCNC: 14 U/L (ref 13–39)
ATRIAL RATE: 73 BPM
B-HCG SERPL-ACNC: ABNORMAL MIU/ML (ref 0–5)
BACTERIA UR QL AUTO: ABNORMAL /HPF
BASOPHILS # BLD AUTO: 0.03 THOUSANDS/ÂΜL (ref 0–0.1)
BASOPHILS NFR BLD AUTO: 0 % (ref 0–1)
BILIRUB SERPL-MCNC: 0.44 MG/DL (ref 0.2–1)
BILIRUB UR QL STRIP: NEGATIVE
BUN SERPL-MCNC: 8 MG/DL (ref 5–25)
CALCIUM SERPL-MCNC: 9.1 MG/DL (ref 8.4–10.2)
CARDIAC TROPONIN I PNL SERPL HS: <2 NG/L
CARDIAC TROPONIN I PNL SERPL HS: <2 NG/L
CHLORIDE SERPL-SCNC: 106 MMOL/L (ref 96–108)
CLARITY UR: CLEAR
CO2 SERPL-SCNC: 23 MMOL/L (ref 21–32)
COLOR UR: ABNORMAL
CREAT SERPL-MCNC: 0.53 MG/DL (ref 0.6–1.3)
EOSINOPHIL # BLD AUTO: 0.13 THOUSAND/ÂΜL (ref 0–0.61)
EOSINOPHIL NFR BLD AUTO: 1 % (ref 0–6)
ERYTHROCYTE [DISTWIDTH] IN BLOOD BY AUTOMATED COUNT: 15.1 % (ref 11.6–15.1)
EXT PREGNANCY TEST URINE: POSITIVE
EXT. CONTROL: ABNORMAL
GFR SERPL CREATININE-BSD FRML MDRD: 121 ML/MIN/1.73SQ M
GLUCOSE SERPL-MCNC: 146 MG/DL (ref 65–140)
GLUCOSE UR STRIP-MCNC: ABNORMAL MG/DL
HCT VFR BLD AUTO: 43.1 % (ref 34.8–46.1)
HGB BLD-MCNC: 14 G/DL (ref 11.5–15.4)
HGB UR QL STRIP.AUTO: NEGATIVE
IMM GRANULOCYTES # BLD AUTO: 0.07 THOUSAND/UL (ref 0–0.2)
IMM GRANULOCYTES NFR BLD AUTO: 1 % (ref 0–2)
KETONES UR STRIP-MCNC: NEGATIVE MG/DL
LACTATE SERPL-SCNC: 1.5 MMOL/L (ref 0.5–2)
LEUKOCYTE ESTERASE UR QL STRIP: ABNORMAL
LIPASE SERPL-CCNC: 29 U/L (ref 11–82)
LYMPHOCYTES # BLD AUTO: 1.43 THOUSANDS/ÂΜL (ref 0.6–4.47)
LYMPHOCYTES NFR BLD AUTO: 14 % (ref 14–44)
MCH RBC QN AUTO: 28.5 PG (ref 26.8–34.3)
MCHC RBC AUTO-ENTMCNC: 32.5 G/DL (ref 31.4–37.4)
MCV RBC AUTO: 88 FL (ref 82–98)
MONOCYTES # BLD AUTO: 0.44 THOUSAND/ÂΜL (ref 0.17–1.22)
MONOCYTES NFR BLD AUTO: 4 % (ref 4–12)
MUCOUS THREADS UR QL AUTO: ABNORMAL
NEUTROPHILS # BLD AUTO: 7.82 THOUSANDS/ÂΜL (ref 1.85–7.62)
NEUTS SEG NFR BLD AUTO: 80 % (ref 43–75)
NITRITE UR QL STRIP: NEGATIVE
NON-SQ EPI CELLS URNS QL MICRO: ABNORMAL /HPF
NRBC BLD AUTO-RTO: 0 /100 WBCS
P AXIS: 69 DEGREES
PH UR STRIP.AUTO: 6 [PH]
PLATELET # BLD AUTO: 231 THOUSANDS/UL (ref 149–390)
PMV BLD AUTO: 10.7 FL (ref 8.9–12.7)
POTASSIUM SERPL-SCNC: 3.7 MMOL/L (ref 3.5–5.3)
PR INTERVAL: 124 MS
PROT SERPL-MCNC: 6.5 G/DL (ref 6.4–8.4)
PROT UR STRIP-MCNC: NEGATIVE MG/DL
QRS AXIS: 53 DEGREES
QRSD INTERVAL: 86 MS
QT INTERVAL: 376 MS
QTC INTERVAL: 414 MS
RBC # BLD AUTO: 4.91 MILLION/UL (ref 3.81–5.12)
RBC #/AREA URNS AUTO: ABNORMAL /HPF
SODIUM SERPL-SCNC: 136 MMOL/L (ref 135–147)
SP GR UR STRIP.AUTO: 1.02 (ref 1–1.03)
T WAVE AXIS: 49 DEGREES
T4 FREE SERPL-MCNC: 0.69 NG/DL (ref 0.61–1.12)
TSH SERPL DL<=0.05 MIU/L-ACNC: 0.35 UIU/ML (ref 0.45–4.5)
UROBILINOGEN UR STRIP-ACNC: <2 MG/DL
VENTRICULAR RATE: 73 BPM
WBC # BLD AUTO: 9.92 THOUSAND/UL (ref 4.31–10.16)
WBC #/AREA URNS AUTO: ABNORMAL /HPF

## 2024-04-25 PROCEDURE — 96360 HYDRATION IV INFUSION INIT: CPT

## 2024-04-25 PROCEDURE — 99284 EMERGENCY DEPT VISIT MOD MDM: CPT

## 2024-04-25 PROCEDURE — 76801 OB US < 14 WKS SINGLE FETUS: CPT

## 2024-04-25 PROCEDURE — 96361 HYDRATE IV INFUSION ADD-ON: CPT

## 2024-04-25 PROCEDURE — 93005 ELECTROCARDIOGRAM TRACING: CPT

## 2024-04-25 PROCEDURE — 84702 CHORIONIC GONADOTROPIN TEST: CPT | Performed by: EMERGENCY MEDICINE

## 2024-04-25 PROCEDURE — 81025 URINE PREGNANCY TEST: CPT | Performed by: EMERGENCY MEDICINE

## 2024-04-25 PROCEDURE — 83690 ASSAY OF LIPASE: CPT | Performed by: EMERGENCY MEDICINE

## 2024-04-25 PROCEDURE — 84443 ASSAY THYROID STIM HORMONE: CPT | Performed by: EMERGENCY MEDICINE

## 2024-04-25 PROCEDURE — 84439 ASSAY OF FREE THYROXINE: CPT | Performed by: EMERGENCY MEDICINE

## 2024-04-25 PROCEDURE — 99284 EMERGENCY DEPT VISIT MOD MDM: CPT | Performed by: EMERGENCY MEDICINE

## 2024-04-25 PROCEDURE — 85025 COMPLETE CBC W/AUTO DIFF WBC: CPT | Performed by: EMERGENCY MEDICINE

## 2024-04-25 PROCEDURE — 84484 ASSAY OF TROPONIN QUANT: CPT | Performed by: EMERGENCY MEDICINE

## 2024-04-25 PROCEDURE — 80053 COMPREHEN METABOLIC PANEL: CPT | Performed by: EMERGENCY MEDICINE

## 2024-04-25 PROCEDURE — 36415 COLL VENOUS BLD VENIPUNCTURE: CPT | Performed by: EMERGENCY MEDICINE

## 2024-04-25 PROCEDURE — 93010 ELECTROCARDIOGRAM REPORT: CPT | Performed by: INTERNAL MEDICINE

## 2024-04-25 PROCEDURE — 81001 URINALYSIS AUTO W/SCOPE: CPT | Performed by: EMERGENCY MEDICINE

## 2024-04-25 PROCEDURE — 83605 ASSAY OF LACTIC ACID: CPT | Performed by: EMERGENCY MEDICINE

## 2024-04-25 RX ADMIN — SODIUM CHLORIDE 1000 ML: 0.9 INJECTION, SOLUTION INTRAVENOUS at 12:05

## 2024-04-25 NOTE — ED PROVIDER NOTES
History  Chief Complaint   Patient presents with   • Dizziness     Pt presents with dizziness, n/v. States her PCP thinks she has a gluten allergy so she began cutting it out. Since then she has had these symptoms. States she also could be pregnant so she is unsure what the root cause is.          Blood type is B+ per review of prior labs.        Prior to Admission Medications   Prescriptions Last Dose Informant Patient Reported? Taking?   albuterol (2.5 mg/3 mL) 0.083 % nebulizer solution  Self No No   Sig: Take 1 vial (2.5 mg total) by nebulization every 6 (six) hours as needed for wheezing or shortness of breath   albuterol (Ventolin HFA) 90 mcg/act inhaler  Self No No   Sig: Inhale 2 puffs every 6 (six) hours as needed for wheezing   busPIRone (BUSPAR) 5 mg tablet  Self No No   Sig: Take 1 tablet (5 mg total) by mouth 2 (two) times a day   Patient not taking: Reported on 11/4/2022   escitalopram (Lexapro) 10 mg tablet  Self No No   Sig: Take 1 tablet (10 mg total) by mouth daily   Patient not taking: Reported on 11/4/2022   hydrOXYzine HCL (ATARAX) 25 mg tablet  Self Yes No   Sig: Take 25 mg by mouth if needed for itching   lidocaine (LMX) 4 % cream  Self No No   Sig: Apply topically as needed for mild pain   Patient not taking: Reported on 11/4/2022   methocarbamol (ROBAXIN) 500 mg tablet  Self No No   Sig: Take 1 tablet (500 mg total) by mouth 3 (three) times a day as needed for muscle spasms   Patient not taking: Reported on 11/4/2022   naproxen (Naprosyn) 500 mg tablet   No No   Sig: Take 1 tablet (500 mg total) by mouth 2 (two) times a day with meals for 15 days   ondansetron (ZOFRAN) 4 mg tablet   No No   Sig: Take 1 tablet (4 mg total) by mouth every 6 (six) hours      Facility-Administered Medications: None       Past Medical History:   Diagnosis Date   • Anesthesia complication     oxygen levels drop - always needs oxygen   • Anxiety    • Asthma     not on medications   • Chronic hypertension in  "obstetric context in second trimester 10/16/2018   • Depression    • Gestational diabetes    • Hypertension    • Iron deficiency anemia secondary to inadequate dietary iron intake 3/14/2019   • Kidney stone    • Motion sickness    • PONV (postoperative nausea and vomiting)     \"always needs oxygen after  \"   • Scaly patch rash    • UTI (urinary tract infection)    • Varicella     had as a child   • Wears glasses        Past Surgical History:   Procedure Laterality Date   • CYSTOSCOPY     • [place] CYSTO/URETERO W/LITHOTRIPSY &INDWELL STENT INSRT Right 6/12/2018    Procedure: CYSTOSCOPY URETEROSCOPY WITH LITHOTRIPSY HOLMIUM LASER, RETROGRADE PYELOGRAM AND INSERTION STENT URETERAL;  Surgeon: Jose Pollard MD;  Location: [facility];  Service: Urology   • [place] CYSTO/URETERO W/LITHOTRIPSY &INDWELL STENT INSRT Right 7/17/2018    Procedure: CYSTO, URETEROSCOPY, RETROGRADE PYELOGRAM, STENT REMOVAL,STENT INSERTION,STONE EXTRACTION WITH BASKET;  Surgeon: Jassi Fontenot MD;  Location: [facility];  Service: Urology   • RENAL ARTERY STENT  2015       Family History   Problem Relation Age of Onset   • Diabetes Mother    • Hypertension Mother    • Muscular dystrophy Family      I have reviewed and agree with the history as documented.    E-Cigarette/Vaping   • E-Cigarette Use Never User      E-Cigarette/Vaping Substances   • Nicotine No    • THC No    • CBD No    • Flavoring No    • Other No    • Unknown No      Social History     Tobacco Use   • Smoking status: Some Days     Current packs/day: 0.20     Types: Cigarettes   • Smokeless tobacco: Never   Vaping Use   • Vaping status: Never Used   Substance Use Topics   • Alcohol use: Yes     Comment: socially   • Drug use: No       Review of Systems    Physical Exam  Physical Exam    Vital Signs  ED Triage Vitals [04/25/24 1131]   Temperature Pulse Respirations Blood Pressure SpO2   (!) 97.4 °F (36.3 °C) 72 20 127/63 98 %      Temp Source Heart Rate Source Patient Position - " Orthostatic VS BP Location FiO2 (%)   Oral Monitor Sitting Right arm --      Pain Score       No Pain           Vitals:    04/25/24 1131   BP: 127/63   Pulse: 72   Patient Position - Orthostatic VS: Sitting         Visual Acuity      ED Medications  Medications   sodium chloride 0.9 % bolus 1,000 mL (1,000 mL Intravenous New Bag 4/25/24 1205)       Diagnostic Studies  Results Reviewed       Procedure Component Value Units Date/Time    Urine Microscopic [[id number]]  (Abnormal) Collected: 04/25/24 1200    Lab Status: Final result Specimen: Urine, Clean Catch Updated: 04/25/24 1217     RBC, UA 1-2 /hpf      WBC, UA 1-2 /hpf      Epithelial Cells Occasional /hpf      Bacteria, UA None Seen /hpf      MUCUS THREADS Occasional    UA (URINE) with reflex to Scope [[id number]]  (Abnormal) Collected: 04/25/24 1200    Lab Status: Final result Specimen: Urine, Clean Catch Updated: 04/25/24 1216     Color, UA Light Yellow     Clarity, UA Clear     Specific Gravity, UA 1.023     pH, UA 6.0     Leukocytes, UA Small     Nitrite, UA Negative     Protein, UA Negative mg/dl      Glucose, UA Trace mg/dl      Ketones, UA Negative mg/dl      Urobilinogen, UA <2.0 mg/dl      Bilirubin, UA Negative     Occult Blood, UA Negative    CBC and differential [[id number]]  (Abnormal) Collected: 04/25/24 1200    Lab Status: Final result Specimen: Blood from Arm, Left Updated: 04/25/24 1213     WBC 9.92 Thousand/uL      RBC 4.91 Million/uL      Hemoglobin 14.0 g/dL      Hematocrit 43.1 %      MCV 88 fL      MCH 28.5 pg      MCHC 32.5 g/dL      RDW 15.1 %      MPV 10.7 fL      Platelets 231 Thousands/uL      nRBC 0 /100 WBCs      Segmented % 80 %      Immature Grans % 1 %      Lymphocytes % 14 %      Monocytes % 4 %      Eosinophils Relative 1 %      Basophils Relative 0 %      Absolute Neutrophils 7.82 Thousands/µL      Absolute Immature Grans 0.07 Thousand/uL      Absolute Lymphocytes 1.43 Thousands/µL      Absolute Monocytes 0.44 Thousand/µL       Eosinophils Absolute 0.13 Thousand/µL      Basophils Absolute 0.03 Thousands/µL     Comprehensive metabolic panel [[id number]] Collected: 04/25/24 1200    Lab Status: In process Specimen: Blood from Arm, Left Updated: 04/25/24 1210    Lipase [[id number]] Collected: 04/25/24 1200    Lab Status: In process Specimen: Blood from Arm, Left Updated: 04/25/24 1210    TSH, 3rd generation with Free T4 reflex [[id number]] Collected: 04/25/24 1200    Lab Status: In process Specimen: Blood from Arm, Left Updated: 04/25/24 1210    Lactic acid, plasma (w/reflex if result > 2.0) [[id number]] Collected: 04/25/24 1200    Lab Status: In process Specimen: Blood from Arm, Left Updated: 04/25/24 1210    HS Troponin 0hr (reflex protocol) [[id number]] Collected: 04/25/24 1200    Lab Status: In process Specimen: Blood from Arm, Left Updated: 04/25/24 1210    POCT pregnancy, urine [[id number]]  (Abnormal) Resulted: 04/25/24 1205    Lab Status: Final result Updated: 04/25/24 1206     EXT Preg Test, Ur Positive     Control Valid                   No orders to display              Procedures  Procedures         ED Course  ED Course as of 04/25/24 1236   Thu Apr 25, 2024   1218 Differential diagnosis includes but is not limited to lightheadedness secondary to pregnancy, orthostasis, dehydration, anemia, hypo or hyperthyroidism, anxiety versus less likely dysrhythmia.   1221 Pregnancy test has returned positive.  LMP was in March.   1229 Patient updated on pregnancy test results.  LMP 3/18.  She does have a GYN appointment scheduled for tomorrow.  Ultrasound has been ordered along with hCG quantitative given lightheadedness.   1232 Patient is not anemic.  Very small leukocyte esterases appreciated with occasional epithelial cells though no bacteria.  UA not suggestive of UTI.  Patient not having any UTI specific symptoms.                       PERC Rule for PE      Flowsheet Row Most Recent Value   PERC Rule for PE    Age >=50 0 Filed at: 04/25/2024  1154   HR >=100 0 Filed at: 04/25/2024 1154   O2 Sat on room air < 95% 0 Filed at: 04/25/2024 1154   History of PE or DVT 0 Filed at: 04/25/2024 1154   Recent trauma or surgery 0 Filed at: 04/25/2024 1154   Hemoptysis 0 Filed at: 04/25/2024 1154   Exogenous estrogen 0 Filed at: 04/25/2024 1154   Unilateral leg swelling 0 Filed at: 04/25/2024 1154   PERC Rule for PE Results 0 Filed at: 04/25/2024 1154                    Wells' Criteria for PE      Flowsheet Row Most Recent Value   Wells' Criteria for PE    Clinical signs and symptoms of DVT 0 Filed at: 04/25/2024 1154   PE is primary diagnosis or equally likely 0 Filed at: 04/25/2024 1154   HR >100 0 Filed at: 04/25/2024 1154   Immobilization at least 3 days or Surgery in the previous 4 weeks 0 Filed at: 04/25/2024 1154   Previous, objectively diagnosed PE or DVT 0 Filed at: 04/25/2024 1154   Hemoptysis 0 Filed at: 04/25/2024 1154   Malignancy with treatment within 6 months or palliative 0 Filed at: 04/25/2024 1154   Wells' Criteria Total 0 Filed at: 04/25/2024 1154                  Medical Decision Making  Amount and/or Complexity of Data Reviewed  Labs: ordered.             Disposition  Final diagnoses:   None     ED Disposition       None          Follow-up Information    None         Patient's Medications   Discharge Prescriptions    No medications on file       No discharge procedures on file.    PDMP Review         Value Time User    PDMP Reviewed  Yes 12/9/2019 11:37 AM Cecil Cagle MD            ED Provider  Electronically Signed by           Left Updated: 04/25/24 1248     Lipase 29 u/L     HS Troponin 0hr (reflex protocol) [[id number]]  (Normal) Collected: 04/25/24 1200    Lab Status: Final result Specimen: Blood from Arm, Left Updated: 04/25/24 1236     hs TnI 0hr <2 ng/L     Urine Microscopic [[id number]]  (Abnormal) Collected: 04/25/24 1200    Lab Status: Final result Specimen: Urine, Clean Catch Updated: 04/25/24 1217     RBC, UA 1-2 /hpf      WBC, UA 1-2 /hpf      Epithelial Cells Occasional /hpf      Bacteria, UA None Seen /hpf      MUCUS THREADS Occasional    UA (URINE) with reflex to Scope [[id number]]  (Abnormal) Collected: 04/25/24 1200    Lab Status: Final result Specimen: Urine, Clean Catch Updated: 04/25/24 1216     Color, UA Light Yellow     Clarity, UA Clear     Specific Gravity, UA 1.023     pH, UA 6.0     Leukocytes, UA Small     Nitrite, UA Negative     Protein, UA Negative mg/dl      Glucose, UA Trace mg/dl      Ketones, UA Negative mg/dl      Urobilinogen, UA <2.0 mg/dl      Bilirubin, UA Negative     Occult Blood, UA Negative    CBC and differential [[id number]]  (Abnormal) Collected: 04/25/24 1200    Lab Status: Final result Specimen: Blood from Arm, Left Updated: 04/25/24 1213     WBC 9.92 Thousand/uL      RBC 4.91 Million/uL      Hemoglobin 14.0 g/dL      Hematocrit 43.1 %      MCV 88 fL      MCH 28.5 pg      MCHC 32.5 g/dL      RDW 15.1 %      MPV 10.7 fL      Platelets 231 Thousands/uL      nRBC 0 /100 WBCs      Segmented % 80 %      Immature Grans % 1 %      Lymphocytes % 14 %      Monocytes % 4 %      Eosinophils Relative 1 %      Basophils Relative 0 %      Absolute Neutrophils 7.82 Thousands/µL      Absolute Immature Grans 0.07 Thousand/uL      Absolute Lymphocytes 1.43 Thousands/µL      Absolute Monocytes 0.44 Thousand/µL      Eosinophils Absolute 0.13 Thousand/µL      Basophils Absolute 0.03 Thousands/µL     POCT pregnancy, urine [[id number]]  (Abnormal) Resulted: 04/25/24 1205    Lab Status: Final result Updated: 04/25/24  1206     EXT Preg Test, Ur Positive     Control Valid                   US OB < 14 weeks with transvaginal   Final Result by Ron Phillips MD (04/25 1418)      Single live intrauterine gestation at 6 weeks 1 days (range +/- 2 days) by crown-rump length.      SJ of 12/18/2024.      Workstation performed: [id number]                    Procedures  Procedures         ED Course  ED Course as of 04/30/24 0322   u Apr 25, 2024   1218 Differential diagnosis includes but is not limited to lightheadedness secondary to pregnancy, orthostasis, dehydration, anemia, hypo or hyperthyroidism, anxiety versus less likely dysrhythmia.   1221 Pregnancy test has returned positive.  LMP was in March.   1229 Patient updated on pregnancy test results.  LMP 3/18.  She does have a GYN appointment scheduled for tomorrow.  Ultrasound has been ordered along with hCG quantitative given lightheadedness.   1232 Patient is not anemic.  Very small leukocyte esterases appreciated with occasional epithelial cells though no bacteria.  UA not suggestive of UTI.  Patient not having any UTI specific symptoms.   1315 Chemistry unremarkable.  Patient is not currently fasting.  TSH slightly low.  Free T4 pending.  Will require outpatient follow-up if abnormal.   1358 Pregnancy appears to be intrauterine.  Radiology interpretation pending.     Patient feels well for discharge.  Has appointment for GYN/OB care tomorrow.  Encouraged to keep same.                  PERC Rule for PE      Flowsheet Row Most Recent Value   PERC Rule for PE    Age >=50 0 Filed at: 04/25/2024 1154   HR >=100 0 Filed at: 04/25/2024 1154   O2 Sat on room air < 95% 0 Filed at: 04/25/2024 1154   History of PE or DVT 0 Filed at: 04/25/2024 1154   Recent trauma or surgery 0 Filed at: 04/25/2024 1154   Hemoptysis 0 Filed at: 04/25/2024 1154   Exogenous estrogen 0 Filed at: 04/25/2024 1154   Unilateral leg swelling 0 Filed at: 04/25/2024 1154   PERC Rule for PE Results 0 Filed  at: 04/25/2024 1154                    Wells' Criteria for PE      Flowsheet Row Most Recent Value   Wells' Criteria for PE    Clinical signs and symptoms of DVT 0 Filed at: 04/25/2024 1154   PE is primary diagnosis or equally likely 0 Filed at: 04/25/2024 1154   HR >100 0 Filed at: 04/25/2024 1154   Immobilization at least 3 days or Surgery in the previous 4 weeks 0 Filed at: 04/25/2024 1154   Previous, objectively diagnosed PE or DVT 0 Filed at: 04/25/2024 1154   Hemoptysis 0 Filed at: 04/25/2024 1154   Malignancy with treatment within 6 months or palliative 0 Filed at: 04/25/2024 1154   Wells' Criteria Total 0 Filed at: 04/25/2024 1154                  Medical Decision Making  Amount and/or Complexity of Data Reviewed  Labs: ordered.  Radiology: ordered.             Disposition  Final diagnoses:   First trimester pregnancy   Lightheadedness     Time reflects when diagnosis was documented in both MDM as applicable and the Disposition within this note       Time User Action Codes Description Comment    4/25/2024  2:32 PM Vanessa Phoenix Add [Z34.91] First trimester pregnancy     4/25/2024  2:32 PM Vnaessa Phoenix Add [R42] Lightheadedness           ED Disposition       ED Disposition   Discharge    Condition   Stable    Date/Time   Thu Apr 25, 2024 1432    Comment   Kaye Kevin discharge to home/self care.                   Follow-up Information       Follow up With Specialties Details Why Contact Info    Keep your appointment with the family health office tomorrow                Discharge Medication List as of 4/25/2024  2:37 PM        CONTINUE these medications which have NOT CHANGED    Details   albuterol (2.5 mg/3 mL) 0.083 % nebulizer solution Take 1 vial (2.5 mg total) by nebulization every 6 (six) hours as needed for wheezing or shortness of breath, Starting Fri 8/21/2020, Normal      albuterol (Ventolin HFA) 90 mcg/act inhaler Inhale 2 puffs every 6 (six) hours as needed for  wheezing, Starting Fri 8/21/2020, Normal      busPIRone (BUSPAR) 5 mg tablet Take 1 tablet (5 mg total) by mouth 2 (two) times a day, Starting Fri 4/15/2022, Normal      escitalopram (Lexapro) 10 mg tablet Take 1 tablet (10 mg total) by mouth daily, Starting Fri 4/15/2022, Normal      hydrOXYzine HCL (ATARAX) 25 mg tablet Take 25 mg by mouth if needed for itching, Historical Med      lidocaine (LMX) 4 % cream Apply topically as needed for mild pain, Starting Thu 1/13/2022, Normal      methocarbamol (ROBAXIN) 500 mg tablet Take 1 tablet (500 mg total) by mouth 3 (three) times a day as needed for muscle spasms, Starting Thu 1/13/2022, Normal      naproxen (Naprosyn) 500 mg tablet Take 1 tablet (500 mg total) by mouth 2 (two) times a day with meals for 15 days, Starting Fri 11/4/2022, Until Sat 11/19/2022, Normal      ondansetron (ZOFRAN) 4 mg tablet Take 1 tablet (4 mg total) by mouth every 6 (six) hours, Starting u 8/24/2023, Normal             No discharge procedures on file.    PDMP Review         Value Time User    PDMP Reviewed  Yes 12/9/2019 11:37 AM Cecil Cagle MD            ED Provider  Electronically Signed by             Vanessa Phoenix MD  04/30/24 [id number]

## 2024-04-25 NOTE — Clinical Note
Kaye Brown was seen and treated in our emergency department on 4/25/2024.                Diagnosis:     Kaye  may return to work on return date.    She may return on this date: 04/26/2024         If you have any questions or concerns, please don't hesitate to call.      Vanessa Phoenix MD    ______________________________           _______________          _______________  Hospital Representative                              Date                                Time yes

## 2024-04-25 NOTE — DISCHARGE INSTRUCTIONS
Drink several sips of fluid throughout the day to stay well-hydrated.  Begin taking a prenatal vitamin daily.  Keep your appointment as previously scheduled with family practice tomorrow for further evaluation.

## 2024-06-10 ENCOUNTER — HOSPITAL ENCOUNTER (EMERGENCY)
Facility: HOSPITAL | Age: 39
Discharge: HOME | End: 2024-06-10
Attending: EMERGENCY MEDICINE
Payer: COMMERCIAL

## 2024-06-10 VITALS
HEART RATE: 78 BPM | TEMPERATURE: 98.6 F | SYSTOLIC BLOOD PRESSURE: 156 MMHG | OXYGEN SATURATION: 97 % | RESPIRATION RATE: 20 BRPM | DIASTOLIC BLOOD PRESSURE: 72 MMHG

## 2024-06-10 DIAGNOSIS — I10 HYPERTENSION, UNSPECIFIED TYPE: ICD-10-CM

## 2024-06-10 DIAGNOSIS — F41.9 ANXIETY: Primary | ICD-10-CM

## 2024-06-10 PROCEDURE — 99284 EMERGENCY DEPT VISIT MOD MDM: CPT | Performed by: EMERGENCY MEDICINE

## 2024-06-10 PROCEDURE — 99283 EMERGENCY DEPT VISIT LOW MDM: CPT

## 2024-06-10 RX ORDER — LORAZEPAM 0.5 MG/1
0.5 TABLET ORAL ONCE
Status: COMPLETED | OUTPATIENT
Start: 2024-06-10 | End: 2024-06-10

## 2024-06-10 RX ORDER — LORAZEPAM 0.5 MG/1
0.5 TABLET ORAL EVERY 8 HOURS PRN
Qty: 10 TABLET | Refills: 0 | Status: SHIPPED | OUTPATIENT
Start: 2024-06-10

## 2024-06-10 RX ADMIN — LORAZEPAM 0.5 MG: 0.5 TABLET ORAL at 19:25

## 2024-06-10 NOTE — ED NOTES
This writer discussed the patients current presentation and recommended discharge plan with Dr. Alcantara.  They agree with the patient being discharged at this time with referrals and/or information about: hotline / warm line numbers; walk-in clinic information; local outpatient resource list for therapists / counselors, psychologists, psychiatrists, and partial programs; and, online / internet resources and support groups.      Advised to utilize natural and existing supports. Advised for safety planning and effective coping skills.  Advised to return to ED if necessary.      [facility] Crisis Number: [place] [phone]  National Suicide Hotline: 1-832.761.8773  Crisis Text Line: Text Connect to 532688    The patient was Instructed to follow up with their established providers, as well as provided resources.     This writer and the patient completed a safety plan.  The patient was provided with a copy of their safety plan with encouragement to utilize the plan following discharge.     In addition, the patient was instructed to call [facility] crisis, other crisis services, 911 or to go to the nearest ER immediately if their situation changes at any time.     This writer discussed discharge plans with the patient, who agrees with and understands the discharge plans.         SAFETY PLAN  Warning Signs (thoughts, images, mood, behavior, situations) of a potential crisis: emotional shifts, inconsistencies with my recent romantic partner, feeling overwhelmed and unable to manage my thoughts, feeling lonely or isolated from my family       Coping Skills (what can I do to take my mind off the problem, or to keep myself safe): go for a walk, call a friend or visit with my neighbor, go to work, utilize humor (see list below)       Outside Support (who can I reach out to for support and help): Surekha Melendrez Tasia    DEPRESSION TIPS:    Shower. Not a bath, a shower. Use water as hot or cold as you like. You don’t even need  to wash. Just get in under the water and let it run over you for a while. Sit on the floor if you need to.    Moisturize everything. Use whatever lotion you like. Use whatever you want, and use it all over your entire dermis.     Put on clean, comfortable clothes.     Drink cold water. Use ice. If you want, add some mint or lemon for an extra boost.     Clean something. Doesn’t have to be anything big. Organize one drawer of a desk. Wash five dirty dishes. Do a load of laundry. Scrub the bathroom sink.     Blast music. Listen to something upbeat, something that’s got lots of energy. Sing to it, dance to it.    Make food. Don’t just grab something to munch on. Take the time and make food. Even if it’s simple. Prepare food, it tastes way better, and you’ll feel like you accomplished something.     Make something. Write a short story or a poem, draw a picture, color a picture, fold origami, doe or knit, sculpt something out of marlene, anything artistic. Even if you don’t think you’re good at it. Create.     Go outside. Take a walk. Sit in the grass. Look at the clouds. Smell flowers. Put your hands in the dirt and feel the soil against your skin.    Call someone. Call a loved one, a friend, a family member, call a chat service if you have no one else to call. Talk to a stranger on the street. Have a conversation and listen to someone’s voice. If you can’t bring yourself to call, text or email or whatever, just have some social interaction with another person. Even if you don’t say much, listen to them. It helps.     Cuddle your pets if you have them/can cuddle them. Take pictures of them. Talk to them. Tell them how you feel, about your favorite movie, a new game coming out, anything.     May seem small or silly to some, but this list keeps people alive.      Find something to be grateful for!      National Suicide Prevention Hotline:  [address] [place] [address] 133-762-9895 - [facility] 3-534-595-1928 - [facility]  Crisis/Mobile Crisis   [phone] - [facility] Crisis   [place]: [phone]  [place]: [phone]   [facility] [phone] - Crisis   [facility] [phone] - Crisis     [facility] [phone] - Crisis   [facility] [phone] - Crisis   [phone] - Peer Support Talk Line (1-9pm daily)  [phone] - Teen Support Talk Line (1-9pm daily)  [phone] - [facility] [phone]- Crisis    [facility] [phone] - Crisis   [facility] [phone] - Crisis    [facility]) [phone] - [facility]

## 2024-06-10 NOTE — DISCHARGE INSTRUCTIONS
Diagnosis: anxiety - elevated blood pressure in the er - 156/72    - as needed only for anxiety - ativan take one tablet     - if anxiety becomes worse please return to  the er and we will get you more help     - please follow up with the outpatient psychiatric resources     - your blood pressure was elevated in the er- the er is not the best place to check your blood pressure- if you check it at home over several different times places- if it is persistenlty over 140/90- you will need to followup with your primary doctor

## 2024-06-10 NOTE — ED PROVIDER NOTES
"History  Chief Complaint   Patient presents with    Anxiety     Pt reports severe anxiety and panic attacks over the last 3-4 days. Pt tearful. Recent breakup. Denies SI/HI. Pt states \"I have a fear of having people around me\"     38 yr female with hx of anxiety -- pt only takes bzd as needed--  only several times over the last year -- with recent increase in anxiety - no si plan/ intent- is asking for psych follow up and brief bzd rx to take as needed-- no other comps       History provided by:  Patient  Anxiety  Presenting symptoms: no agitation, no hallucinations, no self-mutilation and no suicidal thoughts    Associated symptoms: anxiety        Prior to Admission Medications   Prescriptions Last Dose Informant Patient Reported? Taking?   albuterol (2.5 mg/3 mL) 0.083 % nebulizer solution  Self No No   Sig: Take 1 vial (2.5 mg total) by nebulization every 6 (six) hours as needed for wheezing or shortness of breath   albuterol (Ventolin HFA) 90 mcg/act inhaler  Self No No   Sig: Inhale 2 puffs every 6 (six) hours as needed for wheezing   busPIRone (BUSPAR) 5 mg tablet  Self No No   Sig: Take 1 tablet (5 mg total) by mouth 2 (two) times a day   Patient not taking: Reported on 11/4/2022   escitalopram (Lexapro) 10 mg tablet  Self No No   Sig: Take 1 tablet (10 mg total) by mouth daily   Patient not taking: Reported on 11/4/2022   hydrOXYzine HCL (ATARAX) 25 mg tablet  Self Yes No   Sig: Take 25 mg by mouth if needed for itching   lidocaine (LMX) 4 % cream  Self No No   Sig: Apply topically as needed for mild pain   Patient not taking: Reported on 11/4/2022   methocarbamol (ROBAXIN) 500 mg tablet  Self No No   Sig: Take 1 tablet (500 mg total) by mouth 3 (three) times a day as needed for muscle spasms   Patient not taking: Reported on 11/4/2022   naproxen (Naprosyn) 500 mg tablet   No No   Sig: Take 1 tablet (500 mg total) by mouth 2 (two) times a day with meals for 15 days   ondansetron (ZOFRAN) 4 mg tablet   No No " "  Sig: Take 1 tablet (4 mg total) by mouth every 6 (six) hours      Facility-Administered Medications: None       Past Medical History:   Diagnosis Date    Anesthesia complication     oxygen levels drop - always needs oxygen    Anxiety     Asthma     not on medications    Chronic hypertension in obstetric context in second trimester 10/16/2018    Depression     Gestational diabetes     Hypertension     Iron deficiency anemia secondary to inadequate dietary iron intake 3/14/2019    Kidney stone     Motion sickness     PONV (postoperative nausea and vomiting)     \"always needs oxygen after  \"    Scaly patch rash     UTI (urinary tract infection)     Varicella     had as a child    Wears glasses        Past Surgical History:   Procedure Laterality Date    CYSTOSCOPY      MD CYSTO/URETERO W/LITHOTRIPSY &INDWELL STENT INSRT Right 6/12/2018    Procedure: CYSTOSCOPY URETEROSCOPY WITH LITHOTRIPSY HOLMIUM LASER, RETROGRADE PYELOGRAM AND INSERTION STENT URETERAL;  Surgeon: Jose Pollard MD;  Location: [facility];  Service: Urology    MD CYSTO/URETERO W/LITHOTRIPSY &INDWELL STENT INSRT Right 7/17/2018    Procedure: CYSTO, URETEROSCOPY, RETROGRADE PYELOGRAM, STENT REMOVAL,STENT INSERTION,STONE EXTRACTION WITH BASKET;  Surgeon: Jassi Fontenot MD;  Location: [facility];  Service: Urology    RENAL ARTERY STENT  2015       Family History   Problem Relation Age of Onset    Diabetes Mother     Hypertension Mother     Muscular dystrophy Family      I have reviewed and agree with the history as documented.    E-Cigarette/Vaping    E-Cigarette Use Never User      E-Cigarette/Vaping Substances    Nicotine No     THC No     CBD No     Flavoring No     Other No     Unknown No      Social History     Tobacco Use    Smoking status: Some Days     Current packs/day: 0.20     Types: Cigarettes    Smokeless tobacco: Never   Vaping Use    Vaping status: Never Used   Substance Use Topics    Alcohol use: Yes     Comment: socially    Drug " use: No       Review of Systems   Constitutional: Negative.    HENT: Negative.     Eyes: Negative.    Respiratory: Negative.     Cardiovascular: Negative.    Gastrointestinal: Negative.    Endocrine: Negative.    Genitourinary: Negative.    Musculoskeletal: Negative.    Skin: Negative.    Allergic/Immunologic: Negative.    Neurological: Negative.    Hematological: Negative.    Psychiatric/Behavioral:  Negative for agitation, behavioral problems, confusion, decreased concentration, dysphoric mood, hallucinations, self-injury, sleep disturbance and suicidal ideas. The patient is nervous/anxious. The patient is not hyperactive.        Physical Exam  Physical Exam  Vitals and nursing note reviewed.   Constitutional:       General: She is not in acute distress.     Appearance: Normal appearance. She is not ill-appearing, toxic-appearing or diaphoretic.      Comments: Avss- systolic  htn- pulse ox 97 % on ra- interpretation is normal- no intervention- in nad-    HENT:      Head: Normocephalic and atraumatic.      Nose: Nose normal.      Mouth/Throat:      Mouth: Mucous membranes are moist.   Eyes:      General: No scleral icterus.        Right eye: No discharge.         Left eye: No discharge.      Extraocular Movements: Extraocular movements intact.      Conjunctiva/sclera: Conjunctivae normal.      Pupils: Pupils are equal, round, and reactive to light.      Comments: Mm pink   Neck:      Vascular: No carotid bruit.   Cardiovascular:      Rate and Rhythm: Normal rate and regular rhythm.      Pulses: Normal pulses.      Heart sounds: Normal heart sounds. No murmur heard.     No friction rub. No gallop.   Pulmonary:      Effort: Pulmonary effort is normal. No respiratory distress.      Breath sounds: Normal breath sounds. No stridor. No wheezing, rhonchi or rales.   Chest:      Chest wall: No tenderness.   Abdominal:      General: Bowel sounds are normal. There is no distension.      Palpations: Abdomen is soft. There is  no mass.      Tenderness: There is no abdominal tenderness. There is no right CVA tenderness, left CVA tenderness, guarding or rebound.      Hernia: No hernia is present.   Musculoskeletal:         General: No swelling, tenderness, deformity or signs of injury. Normal range of motion.      Cervical back: Normal range of motion and neck supple. No rigidity or tenderness.      Right lower leg: No edema.      Left lower leg: No edema.      Comments: Bi be edema/erytherma/asym    Lymphadenopathy:      Cervical: No cervical adenopathy.   Skin:     General: Skin is warm.      Capillary Refill: Capillary refill takes less than 2 seconds.      Coloration: Skin is not jaundiced or pale.      Findings: No bruising, erythema, lesion or rash.   Neurological:      General: No focal deficit present.      Mental Status: She is alert and oriented to person, place, and time. Mental status is at baseline.      Cranial Nerves: No cranial nerve deficit.      Sensory: No sensory deficit.      Motor: No weakness.      Coordination: Coordination normal.      Gait: Gait normal.      Comments: Normal non focal neuro e xam    Psychiatric:         Mood and Affect: Mood normal.         Behavior: Behavior normal.         Thought Content: Thought content normal.         Judgment: Judgment normal.         Vital Signs  ED Triage Vitals [06/10/24 1829]   Temperature Pulse Respirations Blood Pressure SpO2   98.6 °F (37 °C) 78 20 156/72 97 %      Temp Source Heart Rate Source Patient Position - Orthostatic VS BP Location FiO2 (%)   Oral Monitor Sitting Right arm --      Pain Score       --           Vitals:    06/10/24 1829   BP: 156/72   Pulse: 78   Patient Position - Orthostatic VS: Sitting         Visual Acuity      ED Medications  Medications   LORazepam (ATIVAN) tablet 0.5 mg (0.5 mg Oral Given 6/10/24 1925)       Diagnostic Studies  Results Reviewed       None                   No orders to display              Procedures  Procedures         ED  Course                                             Medical Decision Making  Er md highly doubt any medical -cardiac /cause of symptoms=- will tx symptoms and given pt cis referrals - pt states only takes bzd as needed fro severe symptoms- has only taken several times over last year     Problems Addressed:  Anxiety: acute illness or injury with systemic symptoms     Details: Acute on chronic   Hypertension, unspecified type: acute illness or injury     Details: Pt will need to watch     Amount and/or Complexity of Data Reviewed  Discussion of management or test interpretation with external provider(s): Mild amount of er md thought complexity     Risk  Prescription drug management.  Decision regarding hospitalization.             Disposition  Final diagnoses:   Anxiety   Hypertension, unspecified type     Time reflects when diagnosis was documented in both MDM as applicable and the Disposition within this note       Time User Action Codes Description Comment    6/10/2024  7:25 PM Prudencio Alcantara [F41.9] Anxiety     6/10/2024  7:26 PM Prudencio Alcantara [I10] Hypertension, unspecified type           ED Disposition       ED Disposition   Discharge    Condition   Stable    Date/Time   Mon Misael 10, 2024  7:25 PM    Comment   Kaye Kevin discharge to home/self care.                   Follow-up Information    None         Patient's Medications   Discharge Prescriptions    LORAZEPAM (ATIVAN) 0.5 MG TABLET    Take 1 tablet (0.5 mg total) by mouth every 8 (eight) hours as needed for anxiety for up to 10 doses       Start Date: 6/10/2024 End Date: --       Order Dose: 0.5 mg       Quantity: 10 tablet    Refills: 0       No discharge procedures on file.    PDMP Review         Value Time User    PDMP Reviewed  Yes 12/9/2019 11:37 AM Cecil Cagle MD            ED Provider  Electronically Signed by             Prudencio Alcantara MD  06/10/24 2156

## 2024-06-10 NOTE — Clinical Note
Kaye Brown was seen and treated in our emergency department on 6/10/2024.                Diagnosis:     Kaye  may return to work on return date.    She may return on this date: 06/13/2024         If you have any questions or concerns, please don't hesitate to call.      Prudencio Alcantara MD    ______________________________           _______________          _______________  Hospital Representative                              Date                                Time
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
## 2024-07-11 ENCOUNTER — TELEPHONE (OUTPATIENT)
Dept: FAMILY MEDICINE CLINIC | Facility: CLINIC | Age: 39
End: 2024-07-11

## 2024-07-11 NOTE — TELEPHONE ENCOUNTER
Telephone NIS  [phone]     Emergency contact telephone number  [phone]     Wrong number for emergency contact

## 2024-08-14 ENCOUNTER — VBI (OUTPATIENT)
Dept: ADMINISTRATIVE | Facility: OTHER | Age: 39
End: 2024-08-14

## 2024-08-14 NOTE — TELEPHONE ENCOUNTER
08/14/24 12:37 PM     Chart reviewed for Pap Smear (HPV) aka Cervical Cancer Screening ; nothing is submitted to the patient's insurance at this time.     Wilda Pandya MA   [facility]

## 2024-12-27 ENCOUNTER — TELEPHONE (OUTPATIENT)
Dept: FAMILY MEDICINE CLINIC | Facility: CLINIC | Age: 39
End: 2024-12-27

## 2025-01-03 ENCOUNTER — HOSPITAL ENCOUNTER (EMERGENCY)
Facility: HOSPITAL | Age: 40
Discharge: HOME | End: 2025-01-03
Attending: EMERGENCY MEDICINE

## 2025-01-03 VITALS
SYSTOLIC BLOOD PRESSURE: 171 MMHG | DIASTOLIC BLOOD PRESSURE: 70 MMHG | RESPIRATION RATE: 18 BRPM | TEMPERATURE: 97.7 F | HEART RATE: 71 BPM | OXYGEN SATURATION: 99 %

## 2025-01-03 DIAGNOSIS — R68.89 FLU-LIKE SYMPTOMS: Primary | ICD-10-CM

## 2025-01-03 DIAGNOSIS — J45.909 ASTHMA, UNSPECIFIED ASTHMA SEVERITY, UNSPECIFIED WHETHER COMPLICATED, UNSPECIFIED WHETHER PERSISTENT: ICD-10-CM

## 2025-01-03 LAB
FLUAV AG UPPER RESP QL IA.RAPID: NEGATIVE
FLUBV AG UPPER RESP QL IA.RAPID: NEGATIVE
SARS-COV+SARS-COV-2 AG RESP QL IA.RAPID: NEGATIVE

## 2025-01-03 PROCEDURE — 99284 EMERGENCY DEPT VISIT MOD MDM: CPT | Performed by: EMERGENCY MEDICINE

## 2025-01-03 PROCEDURE — 87811 SARS-COV-2 COVID19 W/OPTIC: CPT | Performed by: EMERGENCY MEDICINE

## 2025-01-03 PROCEDURE — 87804 INFLUENZA ASSAY W/OPTIC: CPT | Performed by: EMERGENCY MEDICINE

## 2025-01-03 PROCEDURE — 99283 EMERGENCY DEPT VISIT LOW MDM: CPT

## 2025-01-03 RX ORDER — ONDANSETRON 4 MG/1
4 TABLET, ORALLY DISINTEGRATING ORAL ONCE
Status: COMPLETED | OUTPATIENT
Start: 2025-01-03 | End: 2025-01-03

## 2025-01-03 RX ORDER — ALBUTEROL SULFATE 0.83 MG/ML
2.5 SOLUTION RESPIRATORY (INHALATION) EVERY 6 HOURS PRN
Qty: 75 ML | Refills: 0 | Status: SHIPPED | OUTPATIENT
Start: 2025-01-03 | End: 2025-01-10

## 2025-01-03 RX ORDER — ONDANSETRON 4 MG/1
4 TABLET, ORALLY DISINTEGRATING ORAL EVERY 8 HOURS PRN
Qty: 9 TABLET | Refills: 0 | Status: SHIPPED | OUTPATIENT
Start: 2025-01-03 | End: 2025-01-06

## 2025-01-03 RX ADMIN — ONDANSETRON 4 MG: 4 TABLET, ORALLY DISINTEGRATING ORAL at 09:45

## 2025-01-03 NOTE — Clinical Note
Kaye Brown was seen and treated in our emergency department on 1/3/2025.                Diagnosis:     Kaye  may return to work on return date.    She may return on this date: 01/06/2025         If you have any questions or concerns, please don't hesitate to call.      Prudencio Mata MD    ______________________________           _______________          _______________  Hospital Representative                              Date                                Time

## 2025-01-03 NOTE — ED PROVIDER NOTES
"Time reflects when diagnosis was documented in both MDM as applicable and the Disposition within this note       Time User Action Codes Description Comment    1/3/2025  9:37 AM Prudencio Mata [R68.89] Flu-like symptoms     1/3/2025  9:38 AM Prudencio Mata [J45.909] Asthma, unspecified asthma severity, unspecified whether complicated, unspecified whether persistent           ED Disposition       ED Disposition   Discharge    Condition   Stable    Date/Time   Fri Onesimo 3, 2025  9:37 AM    Comment   Kaye Brown discharge to home/self care.                   Assessment & Plan       Medical Decision Making  Outside of Tamiflu window.  Mother sick with influenza.  Patient presents with viral symptoms.  Will treat with oral medications.  She is overall well-appearing, no acute distress, work note provided.    Amount and/or Complexity of Data Reviewed  Labs: ordered.    Risk  Prescription drug management.             Medications   ondansetron (ZOFRAN-ODT) dispersible tablet 4 mg (4 mg Oral Given 1/3/25 0945)       ED Risk Strat Scores                                              History of Present Illness       Chief Complaint   Patient presents with    Flu Symptoms     Pt complains of cough, SOB, pain in back when coughing, nausea, and body aches since monday. Pt's mom positive for flu.        Past Medical History:   Diagnosis Date    Anesthesia complication     oxygen levels drop - always needs oxygen    Anxiety     Asthma     not on medications    Chronic hypertension in obstetric context in second trimester 10/16/2018    Depression     Gestational diabetes     Hypertension     Iron deficiency anemia secondary to inadequate dietary iron intake 3/14/2019    Kidney stone     Motion sickness     PONV (postoperative nausea and vomiting)     \"always needs oxygen after  \"    Scaly patch rash     UTI (urinary tract infection)     Varicella     had as a child    Wears glasses       Past Surgical History:   Procedure " Laterality Date    CYSTOSCOPY      [place] CYSTO/URETERO W/LITHOTRIPSY &INDWELL STENT INSRT Right 6/12/2018    Procedure: CYSTOSCOPY URETEROSCOPY WITH LITHOTRIPSY HOLMIUM LASER, RETROGRADE PYELOGRAM AND INSERTION STENT URETERAL;  Surgeon: Jose Pollard MD;  Location: [facility];  Service: Urology    [place] CYSTO/URETERO W/LITHOTRIPSY &INDWELL STENT INSRT Right 7/17/2018    Procedure: CYSTO, URETEROSCOPY, RETROGRADE PYELOGRAM, STENT REMOVAL,STENT INSERTION,STONE EXTRACTION WITH BASKET;  Surgeon: Jassi Fontenot MD;  Location: [facility];  Service: Urology    RENAL ARTERY STENT  2015      Family History   Problem Relation Age of Onset    Diabetes Mother     Hypertension Mother     Muscular dystrophy Family       Social History     Tobacco Use    Smoking status: Some Days     Current packs/day: 0.20     Types: Cigarettes    Smokeless tobacco: Never   Vaping Use    Vaping status: Never Used   Substance Use Topics    Alcohol use: Yes     Comment: socially    Drug use: No      E-Cigarette/Vaping    E-Cigarette Use Never User       E-Cigarette/Vaping Substances    Nicotine No     THC No     CBD No     Flavoring No     Other No     Unknown No       I have reviewed and agree with the history as documented.     Patient presents for evaluation of cough, shortness of breath.  Patient's mother has influenza A.  Denies any aggravating alleviating symptoms.  Denies any chest pain.  States she is able to work the past few days.  Has been sick for about 3 days.          Review of Systems   Respiratory:  Positive for cough and shortness of breath.    All other systems reviewed and are negative.          Objective       ED Triage Vitals [01/03/25 0904]   Temperature Pulse Blood Pressure Respirations SpO2 Patient Position - Orthostatic VS   97.7 °F (36.5 °C) 71 (!) 171/70 18 99 % Sitting      Temp Source Heart Rate Source BP Location FiO2 (%) Pain Score    Oral Monitor Right arm -- --      Vitals      Date and Time Temp Pulse SpO2 Resp  BP Pain Score FACES Pain Rating User   01/03/25 0904 97.7 °F (36.5 °C) 71 99 % 18 171/70 -- -- TS            Physical Exam  Vitals and nursing note reviewed.   Constitutional:       General: She is not in acute distress.     Appearance: She is well-developed.   HENT:      Head: Normocephalic and atraumatic.   Eyes:      Conjunctiva/sclera: Conjunctivae normal.   Cardiovascular:      Rate and Rhythm: Normal rate and regular rhythm.      Heart sounds: No murmur heard.  Pulmonary:      Effort: Pulmonary effort is normal. No respiratory distress.      Breath sounds: Normal breath sounds.   Abdominal:      Palpations: Abdomen is soft.      Tenderness: There is no abdominal tenderness.   Musculoskeletal:         General: No swelling.      Cervical back: Neck supple.   Skin:     General: Skin is warm and dry.      Capillary Refill: Capillary refill takes less than 2 seconds.   Neurological:      Mental Status: She is alert.   Psychiatric:         Mood and Affect: Mood normal.         Results Reviewed       Procedure Component Value Units Date/Time    FLU/COVID Rapid Antigen (30 min. TAT) - Preferred screening test in ED [[id number]]  (Normal) Collected: 01/03/25 0907    Lab Status: Final result Specimen: Nares from Nose Updated: 01/03/25 0954     SARS COV Rapid Antigen Negative     Influenza A Rapid Antigen Negative     Influenza B Rapid Antigen Negative    Narrative:      This test has been performed using the Quidel Leeanna 2 FLU+SARS Antigen test under the Emergency Use Authorization (EUA). This test has been validated by the  and verified by the performing laboratory. The Leeanna uses lateral flow immunofluorescent sandwich assay to detect SARS-COV, Influenza A and Influenza B Antigen.     The Quidel Leeanna 2 SARS Antigen test does not differentiate between SARS-CoV and SARS-CoV-2.     Negative results are presumptive and may be confirmed with a molecular assay, if necessary, for patient management. Negative  results do not rule out SARS-CoV-2 or influenza infection and should not be used as the sole basis for treatment or patient management decisions. A negative test result may occur if the level of antigen in a sample is below the limit of detection of this test.     Positive results are indicative of the presence of viral antigens, but do not rule out bacterial infection or co-infection with other viruses.     All test results should be used as an adjunct to clinical observations and other information available to the provider.    FOR PEDIATRIC PATIENTS - copy/paste COVID Guidelines URL to browser: [email or web address]            No orders to display       Procedures    ED Medication and Procedure Management   Prior to Admission Medications   Prescriptions Last Dose Informant Patient Reported? Taking?   LORazepam (Ativan) 0.5 mg tablet   No No   Sig: Take 1 tablet (0.5 mg total) by mouth every 8 (eight) hours as needed for anxiety for up to 10 doses   albuterol (2.5 mg/3 mL) 0.083 % nebulizer solution  Self No No   Sig: Take 1 vial (2.5 mg total) by nebulization every 6 (six) hours as needed for wheezing or shortness of breath   albuterol (2.5 mg/3 mL) 0.083 % nebulizer solution   No Yes   Sig: Take 3 mL (2.5 mg total) by nebulization every 6 (six) hours as needed for wheezing or shortness of breath for up to 7 days   albuterol (Ventolin HFA) 90 mcg/act inhaler  Self No No   Sig: Inhale 2 puffs every 6 (six) hours as needed for wheezing   busPIRone (BUSPAR) 5 mg tablet  Self No No   Sig: Take 1 tablet (5 mg total) by mouth 2 (two) times a day   Patient not taking: Reported on 11/4/2022   escitalopram (Lexapro) 10 mg tablet  Self No No   Sig: Take 1 tablet (10 mg total) by mouth daily   Patient not taking: Reported on 11/4/2022   hydrOXYzine HCL (ATARAX) 25 mg tablet  Self Yes No   Sig: Take 25 mg by mouth if needed for itching   lidocaine (LMX) 4 % cream  Self No  No   Sig: Apply topically as needed for mild pain   Patient not taking: Reported on 11/4/2022   methocarbamol (ROBAXIN) 500 mg tablet  Self No No   Sig: Take 1 tablet (500 mg total) by mouth 3 (three) times a day as needed for muscle spasms   Patient not taking: Reported on 11/4/2022   naproxen (Naprosyn) 500 mg tablet   No No   Sig: Take 1 tablet (500 mg total) by mouth 2 (two) times a day with meals for 15 days   ondansetron (ZOFRAN) 4 mg tablet   No No   Sig: Take 1 tablet (4 mg total) by mouth every 6 (six) hours      Facility-Administered Medications: None     Discharge Medication List as of 1/3/2025  9:39 AM        START taking these medications    Details   ondansetron (ZOFRAN-ODT) 4 mg disintegrating tablet Take 1 tablet (4 mg total) by mouth every 8 (eight) hours as needed for nausea or vomiting for up to 3 days, Starting Fri 1/3/2025, Until Mon 1/6/2025 at 2359, Normal           CONTINUE these medications which have CHANGED    Details   albuterol (2.5 mg/3 mL) 0.083 % nebulizer solution Take 3 mL (2.5 mg total) by nebulization every 6 (six) hours as needed for wheezing or shortness of breath for up to 7 days, Starting Fri 1/3/2025, Until Fri 1/10/2025 at 2359, Normal           CONTINUE these medications which have NOT CHANGED    Details   albuterol (Ventolin HFA) 90 mcg/act inhaler Inhale 2 puffs every 6 (six) hours as needed for wheezing, Starting Fri 8/21/2020, Normal      busPIRone (BUSPAR) 5 mg tablet Take 1 tablet (5 mg total) by mouth 2 (two) times a day, Starting Fri 4/15/2022, Normal      escitalopram (Lexapro) 10 mg tablet Take 1 tablet (10 mg total) by mouth daily, Starting Fri 4/15/2022, Normal      hydrOXYzine HCL (ATARAX) 25 mg tablet Take 25 mg by mouth if needed for itching, Historical Med      lidocaine (LMX) 4 % cream Apply topically as needed for mild pain, Starting u 1/13/2022, Normal      LORazepam (Ativan) 0.5 mg tablet Take 1 tablet (0.5 mg total) by mouth every 8 (eight) hours as  needed for anxiety for up to 10 doses, Starting Mon 6/10/2024, Normal      methocarbamol (ROBAXIN) 500 mg tablet Take 1 tablet (500 mg total) by mouth 3 (three) times a day as needed for muscle spasms, Starting Thu 1/13/2022, Normal      naproxen (Naprosyn) 500 mg tablet Take 1 tablet (500 mg total) by mouth 2 (two) times a day with meals for 15 days, Starting Fri 11/4/2022, Until Sat 11/19/2022, Normal      ondansetron (ZOFRAN) 4 mg tablet Take 1 tablet (4 mg total) by mouth every 6 (six) hours, Starting Thu 8/24/2023, Normal           No discharge procedures on file.  ED SEPSIS DOCUMENTATION   Time reflects when diagnosis was documented in both MDM as applicable and the Disposition within this note       Time User Action Codes Description Comment    1/3/2025  9:37 AM Prudencio Mata [R68.89] Flu-like symptoms     1/3/2025  9:38 AM Prudencio Mata [J45.909] Asthma, unspecified asthma severity, unspecified whether complicated, unspecified whether persistent                  Prudencio Mata MD  01/03/25 0312

## 2025-03-09 ENCOUNTER — HOSPITAL ENCOUNTER (EMERGENCY)
Facility: HOSPITAL | Age: 40
Discharge: HOME | End: 2025-03-10
Attending: EMERGENCY MEDICINE
Payer: COMMERCIAL

## 2025-03-09 VITALS
HEART RATE: 70 BPM | OXYGEN SATURATION: 98 % | DIASTOLIC BLOOD PRESSURE: 65 MMHG | SYSTOLIC BLOOD PRESSURE: 139 MMHG | RESPIRATION RATE: 16 BRPM | TEMPERATURE: 97.3 F

## 2025-03-09 DIAGNOSIS — R23.1 IDIOPATHIC OR PRIMARY LIVEDO RETICULARIS: Primary | ICD-10-CM

## 2025-03-09 PROCEDURE — 93005 ELECTROCARDIOGRAM TRACING: CPT

## 2025-03-09 PROCEDURE — 99285 EMERGENCY DEPT VISIT HI MDM: CPT

## 2025-03-09 NOTE — Clinical Note
Kaye Brown was seen and treated in our emergency department on 3/9/2025.        Other - See Comments        Diagnosis:     Kaye  may return to work on return date.    She may return on this date: 03/10/2025         If you have any questions or concerns, please don't hesitate to call.      Bryce Rucker, DO    ______________________________           _______________          _______________  Hospital Representative                              Date                                Time

## 2025-03-10 LAB
ATRIAL RATE: 69 BPM
BILIRUB UR QL STRIP: NEGATIVE
CLARITY UR: CLEAR
COLOR UR: NORMAL
GLUCOSE UR STRIP-MCNC: NEGATIVE MG/DL
HGB UR QL STRIP.AUTO: NEGATIVE
KETONES UR STRIP-MCNC: NEGATIVE MG/DL
LEUKOCYTE ESTERASE UR QL STRIP: NEGATIVE
NITRITE UR QL STRIP: NEGATIVE
P AXIS: 45 DEGREES
PH UR STRIP.AUTO: 6 [PH]
PR INTERVAL: 112 MS
PROT UR STRIP-MCNC: NEGATIVE MG/DL
QRS AXIS: 65 DEGREES
QRSD INTERVAL: 86 MS
QT INTERVAL: 382 MS
QTC INTERVAL: 410 MS
SP GR UR STRIP.AUTO: 1.02 (ref 1–1.03)
T WAVE AXIS: 9 DEGREES
UROBILINOGEN UR STRIP-ACNC: <2 MG/DL
VENTRICULAR RATE: 69 BPM

## 2025-03-10 PROCEDURE — 81003 URINALYSIS AUTO W/O SCOPE: CPT

## 2025-03-10 PROCEDURE — 99284 EMERGENCY DEPT VISIT MOD MDM: CPT | Performed by: EMERGENCY MEDICINE

## 2025-03-10 PROCEDURE — 93010 ELECTROCARDIOGRAM REPORT: CPT | Performed by: INTERNAL MEDICINE

## 2025-03-10 NOTE — DISCHARGE INSTRUCTIONS
You have been evaluated in the emergency department for evaluation of about a rash.  This may be due to a condition called livedo reticularis, a generally harmless rash which is generally self-limited and does not cause any other symptoms.  However, if your rash should persist or if you should develop other symptoms such as fevers, worsening fatigue, nausea and vomiting, severe abdominal pain, chest pain, shortness of breath, or inability to walk, you should return to the emergency room to be reevaluated.  In the meantime, you have been prescribed a referral to dermatology to follow-up on any ongoing symptoms of this rash.

## 2025-03-12 NOTE — ED PROVIDER NOTES
Time reflects when diagnosis was documented in both MDM as applicable and the Disposition within this note       Time User Action Codes Description Comment    3/9/2025 11:41 PM Bryce Rucker Add [R23.1] Idiopathic or primary livedo reticularis           ED Disposition       ED Disposition   Discharge    Condition   Stable    Date/Time   Sun Mar 9, 2025 11:42 PM    Comment   Kaye Brown discharge to home/self care.                   Assessment & Plan       Medical Decision Making  Patient is a 39 y.o. female who presents to the ED with rash, thigh swelling, suprapubic pain.    Vital signs remained stable throughout ED course.  Urinalysis without any significant findings. Exam as listed below.    Body rash appears to match the description of livedo reticularis as captured in clinical image.  This may be idiopathic or due to underlying immunological disturbance..    Plan: Patient was discharged home and told to follow-up with primary care    View ED course above for further discussion on patient workup.     All labs reviewed and utilized in the medical decision making process  All radiology studies independently viewed by me and interpreted by the radiologist.  I reviewed all testing with the patient.                  Medications - No data to display    ED Risk Strat Scores                                                History of Present Illness       Chief Complaint   Patient presents with    Shortness of Breath     CP, and bilateral leg swelling and bruising for the past 3 weeks.       Past Medical History:   Diagnosis Date    Anesthesia complication     oxygen levels drop - always needs oxygen    Anxiety     Asthma     not on medications    Chronic hypertension in obstetric context in second trimester 10/16/2018    Depression     Gestational diabetes     Hypertension     Iron deficiency anemia secondary to inadequate dietary iron intake 3/14/2019    Kidney stone     Motion sickness     PONV (postoperative nausea  "and vomiting)     \"always needs oxygen after  \"    Scaly patch rash     UTI (urinary tract infection)     Varicella     had as a child    Wears glasses       Past Surgical History:   Procedure Laterality Date    CYSTOSCOPY      [place] CYSTO/URETERO W/LITHOTRIPSY &INDWELL STENT INSRT Right 6/12/2018    Procedure: CYSTOSCOPY URETEROSCOPY WITH LITHOTRIPSY HOLMIUM LASER, RETROGRADE PYELOGRAM AND INSERTION STENT URETERAL;  Surgeon: Jose Pollard MD;  Location[facility];  Service: Urology    [place] CYSTO/URETERO W/LITHOTRIPSY &INDWELL STENT INSRT Right 7/17/2018    Procedure: CYSTO, URETEROSCOPY, RETROGRADE PYELOGRAM, STENT REMOVAL,STENT INSERTION,STONE EXTRACTION WITH BASKET;  Surgeon: Jassi Fontenot MD;  Location: [facility];  Service: Urology    RENAL ARTERY STENT  2015      Family History   Problem Relation Age of Onset    Diabetes Mother     Hypertension Mother     Muscular dystrophy Family       Social History     Tobacco Use    Smoking status: Some Days     Current packs/day: 0.20     Types: Cigarettes    Smokeless tobacco: Never   Vaping Use    Vaping status: Never Used   Substance Use Topics    Alcohol use: Yes     Comment: socially    Drug use: No      E-Cigarette/Vaping    E-Cigarette Use Never User       E-Cigarette/Vaping Substances    Nicotine No     THC No     CBD No     Flavoring No     Other No     Unknown No       I have reviewed and agree with the history as documented.     This is a 39-year-old female with past medical history of obesity, hypertension, uterine fibroids, depression and anxiety, who presents to the emergency department with rash for several days accompanied by intermittent suprapubic pain and subjective leg swelling.  Patient states that this rash seems to have spontaneously appeared and is not pruritic but notes a lacy discoloration of the bilateral thighs and feels that her legs are more swollen than normal.  In addition she endorses some intermittent pressure-like sensation in the " suprapubic region.  She denies any other symptoms accompanying this.      Shortness of Breath  Associated symptoms: no abdominal pain, no chest pain, no cough, no ear pain, no fever, no rash, no sore throat and no vomiting        Review of Systems   Constitutional:  Negative for chills and fever.   HENT:  Negative for ear pain and sore throat.    Eyes:  Negative for pain and visual disturbance.   Respiratory:  Positive for shortness of breath. Negative for cough.    Cardiovascular:  Negative for chest pain and palpitations.   Gastrointestinal:  Negative for abdominal pain and vomiting.   Genitourinary:  Negative for dysuria and hematuria.   Musculoskeletal:  Negative for arthralgias and back pain.   Skin:  Negative for color change and rash.   Neurological:  Negative for seizures and syncope.   All other systems reviewed and are negative.          Objective       ED Triage Vitals [03/09/25 2127]   Temperature Pulse Blood Pressure Respirations SpO2 Patient Position - Orthostatic VS   (!) 97.3 °F (36.3 °C) 79 (!) 162/117 16 98 % Sitting      Temp Source Heart Rate Source BP Location FiO2 (%) Pain Score    Temporal Monitor Left arm -- 8      Vitals      Date and Time Temp Pulse SpO2 Resp BP Pain Score FACES Pain Rating User   03/09/25 2245 -- 70 98 % 16 139/65 -- -- ED   03/09/25 2127 97.3 °F (36.3 °C) 79 98 % 16 162/117 8 -- HB            Physical Exam  Vitals and nursing note reviewed.   Constitutional:       General: She is not in acute distress.     Appearance: Normal appearance. She is well-developed and normal weight. She is not ill-appearing or toxic-appearing.   HENT:      Head: Normocephalic and atraumatic.      Right Ear: External ear normal.      Left Ear: External ear normal.      Mouth/Throat:      Mouth: Mucous membranes are moist.      Pharynx: Oropharynx is clear.   Eyes:      General: No scleral icterus.        Right eye: No discharge.         Left eye: No discharge.      Conjunctiva/sclera:  Conjunctivae normal.   Neck:      Vascular: No carotid bruit.   Cardiovascular:      Rate and Rhythm: Normal rate and regular rhythm.      Heart sounds: No murmur heard.     No friction rub. No gallop.   Pulmonary:      Effort: Pulmonary effort is normal. No respiratory distress.      Breath sounds: Normal breath sounds. No stridor. No wheezing, rhonchi or rales.   Chest:      Chest wall: No tenderness.   Abdominal:      General: There is no distension.      Palpations: Abdomen is soft. There is no mass.      Tenderness: There is no abdominal tenderness. There is no right CVA tenderness, left CVA tenderness, guarding or rebound.      Hernia: No hernia is present.   Musculoskeletal:         General: No swelling.      Cervical back: Neck supple. No rigidity or tenderness.      Right lower leg: No edema.      Left lower leg: No edema.   Lymphadenopathy:      Cervical: No cervical adenopathy.   Skin:     General: Skin is warm and dry.      Capillary Refill: Capillary refill takes less than 2 seconds.      Coloration: Skin is not jaundiced or pale.      Findings: No bruising, erythema, lesion or rash.      Comments: Jessica discoloration of the bilateral thighs which appear hyperpigmented   Neurological:      General: No focal deficit present.      Mental Status: She is alert and oriented to person, place, and time. Mental status is at baseline.   Psychiatric:         Mood and Affect: Mood normal.         Results Reviewed       Procedure Component Value Units Date/Time    UA w Reflex to Microscopic w Reflex to Culture [[id number]] Collected: 03/10/25 0023    Lab Status: Final result Specimen: Urine, Clean Catch Updated: 03/10/25 0034     Color, UA Light Yellow     Clarity, UA Clear     Specific Gravity, UA 1.023     pH, UA 6.0     Leukocytes, UA Negative     Nitrite, UA Negative     Protein, UA Negative mg/dl      Glucose, UA Negative mg/dl      Ketones, UA Negative mg/dl      Urobilinogen, UA <2.0 mg/dl      Bilirubin, UA  Negative     Occult Blood, UA Negative            No orders to display       Procedures    ED Medication and Procedure Management   Prior to Admission Medications   Prescriptions Last Dose Informant Patient Reported? Taking?   LORazepam (Ativan) 0.5 mg tablet   No No   Sig: Take 1 tablet (0.5 mg total) by mouth every 8 (eight) hours as needed for anxiety for up to 10 doses   albuterol (Ventolin HFA) 90 mcg/act inhaler  Self No No   Sig: Inhale 2 puffs every 6 (six) hours as needed for wheezing   busPIRone (BUSPAR) 5 mg tablet  Self No No   Sig: Take 1 tablet (5 mg total) by mouth 2 (two) times a day   Patient not taking: Reported on 11/4/2022   escitalopram (Lexapro) 10 mg tablet  Self No No   Sig: Take 1 tablet (10 mg total) by mouth daily   Patient not taking: Reported on 11/4/2022   hydrOXYzine HCL (ATARAX) 25 mg tablet  Self Yes No   Sig: Take 25 mg by mouth if needed for itching   lidocaine (LMX) 4 % cream  Self No No   Sig: Apply topically as needed for mild pain   Patient not taking: Reported on 11/4/2022   methocarbamol (ROBAXIN) 500 mg tablet  Self No No   Sig: Take 1 tablet (500 mg total) by mouth 3 (three) times a day as needed for muscle spasms   Patient not taking: Reported on 11/4/2022   naproxen (Naprosyn) 500 mg tablet   No No   Sig: Take 1 tablet (500 mg total) by mouth 2 (two) times a day with meals for 15 days   ondansetron (ZOFRAN) 4 mg tablet   No No   Sig: Take 1 tablet (4 mg total) by mouth every 6 (six) hours   ondansetron (ZOFRAN-ODT) 4 mg disintegrating tablet   No No   Sig: Take 1 tablet (4 mg total) by mouth every 8 (eight) hours as needed for nausea or vomiting for up to 3 days      Facility-Administered Medications: None     Discharge Medication List as of 3/10/2025 12:39 AM        CONTINUE these medications which have NOT CHANGED    Details   albuterol (Ventolin HFA) 90 mcg/act inhaler Inhale 2 puffs every 6 (six) hours as needed for wheezing, Starting Fri 8/21/2020, Normal       busPIRone (BUSPAR) 5 mg tablet Take 1 tablet (5 mg total) by mouth 2 (two) times a day, Starting Fri 4/15/2022, Normal      escitalopram (Lexapro) 10 mg tablet Take 1 tablet (10 mg total) by mouth daily, Starting Fri 4/15/2022, Normal      hydrOXYzine HCL (ATARAX) 25 mg tablet Take 25 mg by mouth if needed for itching, Historical Med      lidocaine (LMX) 4 % cream Apply topically as needed for mild pain, Starting u 1/13/2022, Normal      LORazepam (Ativan) 0.5 mg tablet Take 1 tablet (0.5 mg total) by mouth every 8 (eight) hours as needed for anxiety for up to 10 doses, Starting Mon 6/10/2024, Normal      methocarbamol (ROBAXIN) 500 mg tablet Take 1 tablet (500 mg total) by mouth 3 (three) times a day as needed for muscle spasms, Starting u 1/13/2022, Normal      naproxen (Naprosyn) 500 mg tablet Take 1 tablet (500 mg total) by mouth 2 (two) times a day with meals for 15 days, Starting Fri 11/4/2022, Until Sat 11/19/2022, Normal      ondansetron (ZOFRAN) 4 mg tablet Take 1 tablet (4 mg total) by mouth every 6 (six) hours, Starting Thu 8/24/2023, Normal      ondansetron (ZOFRAN-ODT) 4 mg disintegrating tablet Take 1 tablet (4 mg total) by mouth every 8 (eight) hours as needed for nausea or vomiting for up to 3 days, Starting Fri 1/3/2025, Until Mon 1/6/2025 at 2359, Normal             ED SEPSIS DOCUMENTATION   Time reflects when diagnosis was documented in both MDM as applicable and the Disposition within this note       Time User Action Codes Description Comment    3/9/2025 11:41 PM Bryce Rucker [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

## 2025-03-14 NOTE — ED ATTENDING ATTESTATION
3/9/2025  I, Paco Batista DO, saw and evaluated the patient. I have discussed the patient with the resident/non-physician practitioner and agree with the resident's/non-physician practitioner's findings, Plan of Care, and MDM as documented in the resident's/non-physician practitioner's note, except where noted. All available labs and Radiology studies were reviewed.  I was present for key portions of any procedure(s) performed by the resident/non-physician practitioner and I was immediately available to provide assistance.       At this point I agree with the current assessment done in the Emergency Department.  I have conducted an independent evaluation of this patient a history and physical is as follows:    39-year-old female with suprapubic pain and some urinary symptoms no systemic symptoms also noticed some swelling and rash in her bilateral thighs.  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

## 2025-05-28 ENCOUNTER — ANNUAL EXAM (OUTPATIENT)
Dept: FAMILY MEDICINE CLINIC | Facility: CLINIC | Age: 40
End: 2025-05-28

## 2025-05-28 VITALS
WEIGHT: 265.6 LBS | OXYGEN SATURATION: 97 % | BODY MASS INDEX: 45.59 KG/M2 | TEMPERATURE: 98.1 F | SYSTOLIC BLOOD PRESSURE: 111 MMHG | DIASTOLIC BLOOD PRESSURE: 72 MMHG | RESPIRATION RATE: 18 BRPM | HEART RATE: 74 BPM

## 2025-05-28 DIAGNOSIS — Z11.3 SCREENING FOR STDS (SEXUALLY TRANSMITTED DISEASES): ICD-10-CM

## 2025-05-28 DIAGNOSIS — Z01.419 WOMEN'S ANNUAL ROUTINE GYNECOLOGICAL EXAMINATION: Primary | ICD-10-CM

## 2025-05-28 DIAGNOSIS — Z12.4 SCREENING FOR CERVICAL CANCER: ICD-10-CM

## 2025-05-28 DIAGNOSIS — E66.9 OBESITY (BMI 30-39.9): ICD-10-CM

## 2025-05-28 PROCEDURE — G0145 SCR C/V CYTO,THINLAYER,RESCR: HCPCS | Performed by: STUDENT IN AN ORGANIZED HEALTH CARE EDUCATION/TRAINING PROGRAM

## 2025-05-28 PROCEDURE — G0476 HPV COMBO ASSAY CA SCREEN: HCPCS | Performed by: STUDENT IN AN ORGANIZED HEALTH CARE EDUCATION/TRAINING PROGRAM

## 2025-05-28 PROCEDURE — 99395 PREV VISIT EST AGE 18-39: CPT | Performed by: FAMILY MEDICINE

## 2025-05-28 NOTE — PROGRESS NOTES
Name: Kaye Brown      : 1985      MRN: [id number]  Encounter Provider: Tamara Bautista MD  Encounter Date: 2025   Encounter department: [facility] [place]    Assessment & Plan  Women's annual routine gynecological examination    Pt presenting today for annual Gyn exam. . No Hx of STDs or abnormal Pap's. Last pap in 2018 was normal. No abnormal findings on Pap. Normal breast exam. Normal periods.     Will send out Liquid Pap and HPV   Sent STD testing  Routine labs sent for next visit for annual physical     Orders:    CBC and differential; Future    TSH, 3rd generation with Free T4 reflex; Future    Hemoglobin A1C; Future    Lipid panel; Future    Comprehensive metabolic panel; Future    Screening for cervical cancer    Orders:    Liquid-based pap, screening    HPV High Risk    Obesity (BMI 30-39.9)    Body mass index is 45.59 kg/m².    Routine labs sent for comorbidities assessment    Orders:    TSH, 3rd generation with Free T4 reflex; Future    Hemoglobin A1C; Future    Lipid panel; Future    Comprehensive metabolic panel; Future    Screening for STDs (sexually transmitted diseases)    Orders:    Chlamydia/GC amplified DNA by PCR; Future    HIV 1/2 AB/AG w Reflex [facility] for 2 yr old and above; Future    Hepatitis C antibody; Future    RPR-Syphilis Screening (Total Syphilis IGG/IGM); Future           History of Present Illness     8 pregnancies, 4 deliveries, 2 miscarriages, 2 abortions  Hx of gestational diabetes, all vaginal, induced all 4  Also hx of MDD and CONCHA w pregnancies, hx of PPD, hx of fibroids w last one   Last pregnancy , First pregnancy   Regular periods, every 28 days, 5-7 days, bad cramping since last pregnancies, normal bleeding   Heavy for 2-3 days, then normal, hx of fibroids since last pregnancy, LMP May 2 -    No illicit drug use  Pt smokes cigarettes and drinks occasionally, about once a month   [place] in [place]   No major  diet regimen or exercise regimen  No hx STDs  Sexually active, but not in the last 4 months   Has used condoms, IUD, pills, and patch  Hx of breast cancer in mom and moms cousin, mom in her 50's  No contraception currently  About 4 sexual partners, more than 10 years with last partner, one more partner after  Last Pap: 2018 - Neg   No other cancers in the family    Gynecologic Exam  She reports no genital itching, genital lesions, genital odor, genital rash, missed menses, pelvic pain, vaginal bleeding or vaginal discharge. Pertinent negatives include no abdominal pain, constipation, diarrhea, discolored urine, dysuria, fever, frequency, hematuria or painful intercourse. She is not sexually active. She uses nothing for contraception.     Review of Systems   Constitutional:  Negative for fever.   Gastrointestinal:  Negative for abdominal pain, constipation and diarrhea.   Genitourinary:  Negative for dysuria, frequency, hematuria, missed menses, pelvic pain and vaginal discharge.     Objective   /72 (BP Location: Left arm, Patient Position: Sitting, Cuff Size: Large)   Pulse 74   Temp 98.1 °F (36.7 °C) (Temporal)   Resp 18   Wt 120 kg (265 lb 9.6 oz)   SpO2 97%   BMI 45.59 kg/m²      Physical Exam  Vitals and nursing note reviewed. Exam conducted with a chaperone present.   Constitutional:       General: She is not in acute distress.     Appearance: She is well-developed. She is obese.   HENT:      Head: Normocephalic and atraumatic.      Right Ear: External ear normal.      Left Ear: External ear normal.      Nose: Nose normal.     Eyes:      Conjunctiva/sclera: Conjunctivae normal.       Cardiovascular:      Rate and Rhythm: Normal rate.   Pulmonary:      Effort: Pulmonary effort is normal. No respiratory distress.      Breath sounds: Normal breath sounds.   Abdominal:      General: Abdomen is flat. There is no distension.      Palpations: Abdomen is soft.      Tenderness: There is no abdominal  tenderness.      Hernia: There is no hernia in the left inguinal area or right inguinal area.   Genitourinary:     General: Normal vulva.      Pubic Area: No rash or pubic lice.       Chente stage (genital): 5.      Labia:         Right: No rash, tenderness, lesion or injury.         Left: No rash, tenderness, lesion or injury.       Urethra: No prolapse, urethral pain, urethral swelling or urethral lesion.      Vagina: Normal. No vaginal discharge, erythema, bleeding or prolapsed vaginal walls.      Cervix: Normal. No discharge, friability, erythema or eversion.      Uterus: Normal.       Adnexa: Right adnexa normal and left adnexa normal.      Rectum: Normal.     Musculoskeletal:         General: No swelling.      Cervical back: Normal range of motion and neck supple.   Lymphadenopathy:      Lower Body: No right inguinal adenopathy. No left inguinal adenopathy.     Skin:     General: Skin is warm and dry.     Neurological:      Mental Status: She is alert and oriented to person, place, and time.      Motor: No weakness.      Gait: Gait normal.     Psychiatric:         Mood and Affect: Mood normal.         Behavior: Behavior normal.         Thought Content: Thought content normal.         Judgment: Judgment normal.     It was a pleasure to be of service to Kaye Brown.    Tamara Bautista MD, MSMS - PGY4  [facility] FM / Geriatric Fellow

## 2025-05-29 LAB
HPV HR 12 DNA CVX QL NAA+PROBE: NEGATIVE
HPV16 DNA CVX QL NAA+PROBE: NEGATIVE
HPV18 DNA CVX QL NAA+PROBE: NEGATIVE

## 2025-05-30 LAB
LAB AP GYN PRIMARY INTERPRETATION: NORMAL
LAB AP LMP: NORMAL
Lab: NORMAL
PATH INTERP SPEC-IMP: NORMAL

## 2025-06-03 ENCOUNTER — TELEPHONE (OUTPATIENT)
Dept: FAMILY MEDICINE CLINIC | Facility: CLINIC | Age: 40
End: 2025-06-03

## 2025-06-03 NOTE — TELEPHONE ENCOUNTER
Patient requesting a call back needed specific lab testing done which she claim wasn't send it to the lab after receiving the result.       Patient can be reached at

## 2025-06-11 ENCOUNTER — TELEPHONE (OUTPATIENT)
Dept: FAMILY MEDICINE CLINIC | Facility: CLINIC | Age: 40
End: 2025-06-11

## (undated) DEVICE — BASKET STONE RTRVL 4 WIRE HELICAL

## (undated) DEVICE — PACK TUR

## (undated) DEVICE — SPECIMEN CONTAINER STERILE PEEL PACK

## (undated) DEVICE — GLOVE INDICATOR PI UNDERGLOVE SZ 8 BLUE

## (undated) DEVICE — STERILE SURGICAL LUBRICANT,  TUBE: Brand: SURGILUBE

## (undated) DEVICE — URETERAL DUAL LUMEN CATH

## (undated) DEVICE — GLOVE SRG BIOGEL ECLIPSE 7.5

## (undated) DEVICE — CATH URETERAL 5FR X 70 CM FLEX TIP POLYUR BARD

## (undated) DEVICE — GUIDEWIRE STRGHT TIP 0.035 IN  SOLO PLUS

## (undated) DEVICE — 3M™ TEGADERM™ TRANSPARENT FILM DRESSING FRAME STYLE, 1626W, 4 IN X 4-3/4 IN (10 CM X 12 CM), 50/CT 4CT/CASE: Brand: 3M™ TEGADERM™

## (undated) DEVICE — SCD SEQUENTIAL COMPRESSION COMFORT SLEEVE MEDIUM KNEE LENGTH: Brand: KENDALL SCD

## (undated) DEVICE — GLOVE SRG BIOGEL 8

## (undated) DEVICE — INVIEW CLEAR LEGGINGS: Brand: CONVERTORS

## (undated) DEVICE — UROCATCH BAG